# Patient Record
Sex: FEMALE | Race: WHITE | NOT HISPANIC OR LATINO | Employment: OTHER | URBAN - METROPOLITAN AREA
[De-identification: names, ages, dates, MRNs, and addresses within clinical notes are randomized per-mention and may not be internally consistent; named-entity substitution may affect disease eponyms.]

---

## 2018-09-12 LAB
ALT SERPL-CCNC: 22 IU/L (ref 8–45)
AST SERPL-CCNC: 26 IU/L (ref 2–40)
CHOLEST SERPL-MCNC: 167 MG/DL (ref 100–199)
CREAT SERPL-MCNC: 0.81 MG/DL (ref 0.57–1.11)
GFR SERPL CREATININE-BSD FRML MDRD: >60 ML/MIN/1.73M2
GLUCOSE SERPL-MCNC: 114 MG/DL (ref 65–100)
HDLC SERPL-MCNC: 46 MG/DL
LDLC SERPL CALC-MCNC: 92 MG/DL
NONHDLC SERPL-MCNC: 121 MG/DL
POTASSIUM SERPL-SCNC: 4.7 MMOL/L (ref 3.5–5)
TRIGL SERPL-MCNC: 145 MG/DL

## 2019-09-18 ENCOUNTER — TRANSFERRED RECORDS (OUTPATIENT)
Dept: HEALTH INFORMATION MANAGEMENT | Facility: CLINIC | Age: 64
End: 2019-09-18

## 2019-09-18 LAB
ALT SERPL-CCNC: 22 IU/L (ref 8–45)
AST SERPL-CCNC: 25 IU/L (ref 2–40)
CHOLEST SERPL-MCNC: 161 MG/DL (ref 100–199)
CREAT SERPL-MCNC: 0.78 MG/DL (ref 0.57–1.11)
GFR SERPL CREATININE-BSD FRML MDRD: >60 ML/MIN/1.73M2
GLUCOSE SERPL-MCNC: 111 MG/DL (ref 65–100)
HDLC SERPL-MCNC: 48 MG/DL
LDLC SERPL CALC-MCNC: 82 MG/DL
NONHDLC SERPL-MCNC: 113 MG/DL
POTASSIUM SERPL-SCNC: 4.7 MMOL/L (ref 3.5–5)
TRIGL SERPL-MCNC: 155 MG/DL

## 2020-01-27 ENCOUNTER — TRANSFERRED RECORDS (OUTPATIENT)
Dept: HEALTH INFORMATION MANAGEMENT | Facility: CLINIC | Age: 65
End: 2020-01-27

## 2021-02-15 ENCOUNTER — OFFICE VISIT (OUTPATIENT)
Dept: INTERNAL MEDICINE | Facility: CLINIC | Age: 66
End: 2021-02-15
Payer: COMMERCIAL

## 2021-02-15 ENCOUNTER — ANCILLARY PROCEDURE (OUTPATIENT)
Dept: GENERAL RADIOLOGY | Facility: CLINIC | Age: 66
End: 2021-02-15
Attending: INTERNAL MEDICINE
Payer: COMMERCIAL

## 2021-02-15 VITALS
RESPIRATION RATE: 18 BRPM | DIASTOLIC BLOOD PRESSURE: 98 MMHG | TEMPERATURE: 98.7 F | HEIGHT: 66 IN | HEART RATE: 58 BPM | WEIGHT: 290.9 LBS | OXYGEN SATURATION: 94 % | SYSTOLIC BLOOD PRESSURE: 184 MMHG | BODY MASS INDEX: 46.75 KG/M2

## 2021-02-15 DIAGNOSIS — E66.01 MORBID OBESITY (H): ICD-10-CM

## 2021-02-15 DIAGNOSIS — R06.09 DYSPNEA ON EXERTION: Primary | ICD-10-CM

## 2021-02-15 DIAGNOSIS — R05.9 COUGH: ICD-10-CM

## 2021-02-15 DIAGNOSIS — R91.8 PULMONARY INFILTRATES: ICD-10-CM

## 2021-02-15 DIAGNOSIS — I10 BENIGN ESSENTIAL HYPERTENSION: ICD-10-CM

## 2021-02-15 DIAGNOSIS — E78.5 HYPERLIPIDEMIA LDL GOAL <130: ICD-10-CM

## 2021-02-15 DIAGNOSIS — M85.80 OSTEOPENIA, UNSPECIFIED LOCATION: ICD-10-CM

## 2021-02-15 LAB
ERYTHROCYTE [DISTWIDTH] IN BLOOD BY AUTOMATED COUNT: 13.2 % (ref 10–15)
HCT VFR BLD AUTO: 48.3 % (ref 35–47)
HGB BLD-MCNC: 16 G/DL (ref 11.7–15.7)
MCH RBC QN AUTO: 31.7 PG (ref 26.5–33)
MCHC RBC AUTO-ENTMCNC: 33.1 G/DL (ref 31.5–36.5)
MCV RBC AUTO: 96 FL (ref 78–100)
PLATELET # BLD AUTO: 149 10E9/L (ref 150–450)
RBC # BLD AUTO: 5.05 10E12/L (ref 3.8–5.2)
WBC # BLD AUTO: 10.4 10E9/L (ref 4–11)

## 2021-02-15 PROCEDURE — 36415 COLL VENOUS BLD VENIPUNCTURE: CPT | Performed by: INTERNAL MEDICINE

## 2021-02-15 PROCEDURE — 84439 ASSAY OF FREE THYROXINE: CPT | Performed by: INTERNAL MEDICINE

## 2021-02-15 PROCEDURE — 84443 ASSAY THYROID STIM HORMONE: CPT | Performed by: INTERNAL MEDICINE

## 2021-02-15 PROCEDURE — 85027 COMPLETE CBC AUTOMATED: CPT | Performed by: INTERNAL MEDICINE

## 2021-02-15 PROCEDURE — 99204 OFFICE O/P NEW MOD 45 MIN: CPT | Performed by: INTERNAL MEDICINE

## 2021-02-15 PROCEDURE — 80048 BASIC METABOLIC PNL TOTAL CA: CPT | Performed by: INTERNAL MEDICINE

## 2021-02-15 PROCEDURE — 71046 X-RAY EXAM CHEST 2 VIEWS: CPT | Performed by: RADIOLOGY

## 2021-02-15 ASSESSMENT — MIFFLIN-ST. JEOR: SCORE: 1881.26

## 2021-02-15 NOTE — PROGRESS NOTES
Assessment & Plan     Dyspnea on exertion  Her symptoms have been a very gradual onset, slowly progressive.  Her lung exam reveals diffuse crackles, suggestive of some type of pulmonary process like fibrosis.  Her oxygen saturation is slightly low.  Chest x-ray confirms interstitial process.  Would be relatively unlikely to be heart failure given the extent of the infiltrates, I would expect much more severe symptoms, lower oxygen level will discuss with pulmonary and probably order CT, pulmonary follow-up.  I will go ahead and schedule echo just to evaluate her LV function and look for evidence of any wall motion abnormalities.  Doubt that the atenolol is really affecting this but may need to consider it could be a factor.  Since her symptoms are very gradually changing, I would not start any treatment without further evaluation.  She does not really have significant symptoms to suggest a thyroid problem, but will check that, will rule out anemia as a factor in her dyspnea.  - XR Chest 2 Views  - CBC with platelets  - TSH with free T4 reflex  - T4 free  - PULMONARY MEDICINE REFERRAL    Morbid obesity (H)  Work on weight, she has lost 10 pounds, continue working on this.    Cough  This may be related to the pulmonary process but could also be related to her lisinopril.  We will have her hold that for up to a week to see if that helps with cough.  - XR Chest 2 Views  - PULMONARY MEDICINE REFERRAL    Pulmonary infiltrates  As above  - PULMONARY MEDICINE REFERRAL    Benign essential hypertension  Blood pressure here is high. Her home checks have been good.  First I will have her hold the lisinopril to see if it is aggravating her cough at all, may need to use an ARB, continue to monitor at home.  We may need to add another medication if the blood pressure remains high.    Osteopenia, unspecified location  Recommend she stop the Fosamax since her last bone density was normal, will recheck the bone density to get a  new baseline on our machine    Hyperlipidemia LDL goal <130  Labs were good last May, continue medication, will need to recheck later this year    Return in about 4 months (around 6/15/2021) for Physical Exam.    Peyton Marin MD  [facility] [place]    Alexandra Todd is a 65 year old who presents for the following health issues     HPI       New Patient/Transfer of Care  Previous care: [facility]    Current concerns:   1. Dyspnea on exertion: this has been going on for a few years but has gotten worse over the past year. At first she would just get dyspnea on stairs but now can be short of breath on level ground, has to stop after 200-300 feet. She does recover in a few minutes. She does not have any history of asthma. No PND or orthopnea, no chest pain, palpitations, diaphoresis, lightheadedness or edema.     2. Cough: this has been going on for almost a year. It is sometimes related to postnasal drainage but frequently she has a dry cough without drainage.  She does not really have an heartburn.     3. HTN: her home readings usually run 139-140/80, last checked a few weeks ago.     Other problems:   1. Osteopenia: has been on Fosamax 8-9 years. Last DXA was normal in 2014.   2. 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 Gets plugging left ear at times   3. Hyperlipidemia: on treatment, last LDL <100 5/2020.          Current Outpatient Medications   Medication Sig Dispense Refill     Alendronate Sodium (FOSAMAX PO) Take 70 mg by mouth once a week        ATENOLOL PO Take 100 mg by mouth daily        calcium-vitamin D (CALTRATE) 600-400 MG-UNIT per tablet Take 1 tablet by mouth 2 times daily       LISINOPRIL PO Take 40 mg by mouth daily        SIMVASTATIN PO Take 20 mg by mouth At Bedtime         Social History     Tobacco Use     Smoking status: Never Smoker     Smokeless tobacco: Never Used   Substance Use Topics     Alcohol use: Yes     Comment: Occas     Drug use: No          Review of Systems   No fever,  "chills, some postnasal drainage, no sore throat, no heartburn, no chest tightness or wheezing, no chest pain, palpitations, lightheadedness or syncope, diaphoresis, PND, orthopnea,      Objective    BP (!) 184/98 (BP Location: Left arm, Patient Position: Sitting, Cuff Size: Adult Regular)   Pulse 58   Temp 98.7  F (37.1  C) (Oral)   Resp 18   Ht 1.676 m (5' 6\")   Wt 132 kg (290 lb 14.4 oz)   SpO2 94%   BMI 46.95 kg/m    Body mass index is 46.95 kg/m .  Physical Exam     Neck: No thyromegaly or thyroid nodules  Lungs: Diffuse crackles, no wheezes, good air movement  CV: normal S1, S2 without murmur, S3 or S4.   Abdomen: Bowel sounds normal, soft, nontender. No hepatosplenomegaly. No masses.   No edema     Chest x-ray shows diffuse pulmonary infiltrates            "

## 2021-02-15 NOTE — NURSING NOTE
"BP (!) 209/93 (BP Location: Left arm, Patient Position: Sitting, Cuff Size: Adult Regular)   Pulse 58   Temp 98.7  F (37.1  C) (Oral)   Resp 18   Ht 1.676 m (5' 6\")   Wt 132 kg (290 lb 14.4 oz)   SpO2 94%   BMI 46.95 kg/m      "

## 2021-02-16 PROBLEM — E66.01 MORBID OBESITY (H): Status: ACTIVE | Noted: 2021-02-16

## 2021-02-16 PROBLEM — I10 BENIGN ESSENTIAL HYPERTENSION: Status: ACTIVE | Noted: 2021-02-16

## 2021-02-16 PROBLEM — E78.5 HYPERLIPIDEMIA LDL GOAL <130: Status: ACTIVE | Noted: 2021-02-16

## 2021-02-16 PROBLEM — M85.80 OSTEOPENIA, UNSPECIFIED LOCATION: Status: ACTIVE | Noted: 2021-02-16

## 2021-02-16 LAB
ANION GAP SERPL CALCULATED.3IONS-SCNC: 6 MMOL/L (ref 3–14)
BUN SERPL-MCNC: 14 MG/DL (ref 7–30)
CALCIUM SERPL-MCNC: 9.4 MG/DL (ref 8.5–10.1)
CHLORIDE SERPL-SCNC: 107 MMOL/L (ref 94–109)
CO2 SERPL-SCNC: 26 MMOL/L (ref 20–32)
CREAT SERPL-MCNC: 0.75 MG/DL (ref 0.52–1.04)
GFR SERPL CREATININE-BSD FRML MDRD: 83 ML/MIN/{1.73_M2}
GLUCOSE SERPL-MCNC: 87 MG/DL (ref 70–99)
POTASSIUM SERPL-SCNC: 4.4 MMOL/L (ref 3.4–5.3)
SODIUM SERPL-SCNC: 139 MMOL/L (ref 133–144)
T4 FREE SERPL-MCNC: 0.9 NG/DL (ref 0.76–1.46)
TSH SERPL DL<=0.005 MIU/L-ACNC: 4.45 MU/L (ref 0.4–4)

## 2021-02-22 ENCOUNTER — TELEPHONE (OUTPATIENT)
Dept: INTERNAL MEDICINE | Facility: CLINIC | Age: 66
End: 2021-02-22

## 2021-02-22 DIAGNOSIS — I10 BENIGN ESSENTIAL HYPERTENSION: Primary | ICD-10-CM

## 2021-02-22 DIAGNOSIS — J84.9 ILD (INTERSTITIAL LUNG DISEASE) (H): ICD-10-CM

## 2021-02-22 DIAGNOSIS — R06.00 DYSPNEA, UNSPECIFIED TYPE: ICD-10-CM

## 2021-02-22 RX ORDER — IRBESARTAN 300 MG/1
300 TABLET ORAL DAILY
Qty: 30 TABLET | Refills: 1 | Status: SHIPPED | OUTPATIENT
Start: 2021-02-22 | End: 2021-03-22

## 2021-02-22 NOTE — TELEPHONE ENCOUNTER
Patient calls to advise her status on Lisinopril.  Patient states she had a lot of coughing although it is about 50% better.Bp readings from am.  Patient did not know the dates. 139/84 and 144/79. Please advise plan moving forward.

## 2021-02-22 NOTE — TELEPHONE ENCOUNTER
Call to patient, informed of providers message. Patient states she would like to try the irbesartan, pharmacy pended. Patient also states someone was supposed to call her to schedule her for a CT scan and echocardiogram but radiology states she does not have orders placed for it. Per result notes 2/15/21:  I am going to have her get a CT scan of her chest to look at the lungs better. I recommend just a plain ultrasound of the heart to check its function. I am not going to order a stress test for now. She will be called to schedule the CT and echocardiogram.    Please place orders as appropriate.     Thank you.

## 2021-02-22 NOTE — TELEPHONE ENCOUNTER
Sounds like the lisinopril is probably part of the cough.  It sometimes can take a few weeks for it to completely go away after stopping the medication. If she wanted to confirm that the lisinopril is causing cough, she could restart it.  Usually the cough will increase significantly within 1 to 2 days.  Otherwise I would change to lisinopril to new medication from a different family that can work in a similar way called irbesartan or Avapro.  This very rarely causes any cough.   If she wants to go ahead with the irbesartan, I will send a prescription and would suggest scheduling a nurse blood pressure check after 3 weeks or so to make sure the doses keeping the blood pressure under good control.

## 2021-02-23 DIAGNOSIS — I10 BENIGN ESSENTIAL HYPERTENSION: ICD-10-CM

## 2021-02-23 NOTE — TELEPHONE ENCOUNTER
Please advise also so forgot to get the orders after speaking with pulmonary, I did order them now so she will be called.  I sent the irbesartan.

## 2021-02-25 RX ORDER — IRBESARTAN 300 MG/1
TABLET ORAL
Qty: 90 TABLET | Refills: 0 | OUTPATIENT
Start: 2021-02-25

## 2021-02-25 NOTE — TELEPHONE ENCOUNTER
Pending Prescriptions:                       Disp   Refills    irbesartan (AVAPRO) 300 MG tablet [Pharma*90 tab*0            Sig: TAKE 1 TABLET(300 MG) BY MOUTH DAILY    Patient is requesting a 90 day supply

## 2021-03-03 ENCOUNTER — HOSPITAL ENCOUNTER (OUTPATIENT)
Dept: CT IMAGING | Facility: CLINIC | Age: 66
Discharge: HOME | End: 2021-03-03
Attending: INTERNAL MEDICINE | Admitting: INTERNAL MEDICINE
Payer: COMMERCIAL

## 2021-03-03 DIAGNOSIS — R06.00 DYSPNEA, UNSPECIFIED TYPE: ICD-10-CM

## 2021-03-03 DIAGNOSIS — J84.9 ILD (INTERSTITIAL LUNG DISEASE) (H): ICD-10-CM

## 2021-03-03 PROCEDURE — 250N000011 HC RX IP 250 OP 636: Performed by: INTERNAL MEDICINE

## 2021-03-03 PROCEDURE — 71260 CT THORAX DX C+: CPT

## 2021-03-03 PROCEDURE — 250N000009 HC RX 250: Performed by: INTERNAL MEDICINE

## 2021-03-03 RX ORDER — IOPAMIDOL 755 MG/ML
500 INJECTION, SOLUTION INTRAVASCULAR ONCE
Status: COMPLETED | OUTPATIENT
Start: 2021-03-03 | End: 2021-03-03

## 2021-03-03 RX ADMIN — SODIUM CHLORIDE 60 ML: 9 INJECTION, SOLUTION INTRAVENOUS at 12:43

## 2021-03-03 RX ADMIN — IOPAMIDOL 80 ML: 755 INJECTION, SOLUTION INTRAVENOUS at 12:43

## 2021-03-09 ENCOUNTER — HOSPITAL ENCOUNTER (OUTPATIENT)
Dept: CARDIOLOGY | Facility: CLINIC | Age: 66
Discharge: HOME | End: 2021-03-09
Attending: INTERNAL MEDICINE | Admitting: INTERNAL MEDICINE
Payer: COMMERCIAL

## 2021-03-09 DIAGNOSIS — J84.9 ILD (INTERSTITIAL LUNG DISEASE) (H): ICD-10-CM

## 2021-03-09 DIAGNOSIS — R06.00 DYSPNEA, UNSPECIFIED TYPE: ICD-10-CM

## 2021-03-09 PROCEDURE — 93306 TTE W/DOPPLER COMPLETE: CPT

## 2021-03-09 PROCEDURE — 93306 TTE W/DOPPLER COMPLETE: CPT | Mod: 26 | Performed by: INTERNAL MEDICINE

## 2021-03-10 ENCOUNTER — HOSPITAL ENCOUNTER (OUTPATIENT)
Dept: LAB | Facility: CLINIC | Age: 66
Discharge: HOME | End: 2021-03-10
Attending: INTERNAL MEDICINE | Admitting: INTERNAL MEDICINE
Payer: COMMERCIAL

## 2021-03-10 ENCOUNTER — MEDICAL CORRESPONDENCE (OUTPATIENT)
Dept: HEALTH INFORMATION MANAGEMENT | Facility: CLINIC | Age: 66
End: 2021-03-10

## 2021-03-10 ENCOUNTER — TRANSFERRED RECORDS (OUTPATIENT)
Dept: HEALTH INFORMATION MANAGEMENT | Facility: CLINIC | Age: 66
End: 2021-03-10

## 2021-03-10 DIAGNOSIS — Z00.00 ROUTINE MEDICAL EXAM: Primary | ICD-10-CM

## 2021-03-10 DIAGNOSIS — R06.00 DYSPNEA, PAROXYSMAL NOCTURNAL: ICD-10-CM

## 2021-03-10 DIAGNOSIS — J98.01 ACUTE BRONCHOSPASM: ICD-10-CM

## 2021-03-10 DIAGNOSIS — R05.9 COUGH: ICD-10-CM

## 2021-03-10 DIAGNOSIS — D75.1 POLYCYTHEMIA, SECONDARY: Primary | ICD-10-CM

## 2021-03-10 DIAGNOSIS — R06.9 ABNORMAL RESPIRATORY RATE: ICD-10-CM

## 2021-03-10 LAB
ALBUMIN SERPL-MCNC: 3.6 G/DL (ref 3.4–5)
ALP SERPL-CCNC: 91 U/L (ref 40–150)
ALT SERPL W P-5'-P-CCNC: 33 U/L (ref 0–50)
AST SERPL W P-5'-P-CCNC: 23 U/L (ref 0–45)
BASOPHILS # BLD AUTO: 0 10E9/L (ref 0–0.2)
BASOPHILS NFR BLD AUTO: 0.4 %
BILIRUB DIRECT SERPL-MCNC: 0.2 MG/DL (ref 0–0.2)
BILIRUB SERPL-MCNC: 0.9 MG/DL (ref 0.2–1.3)
CRP SERPL-MCNC: <2.9 MG/L (ref 0–8)
DIFFERENTIAL METHOD BLD: ABNORMAL
EOSINOPHIL # BLD AUTO: 0.4 10E9/L (ref 0–0.7)
EOSINOPHIL NFR BLD AUTO: 4.4 %
ERYTHROCYTE [DISTWIDTH] IN BLOOD BY AUTOMATED COUNT: 12.5 % (ref 10–15)
ERYTHROCYTE [SEDIMENTATION RATE] IN BLOOD BY WESTERGREN METHOD: 8 MM/H (ref 0–30)
HCT VFR BLD AUTO: 52.1 % (ref 35–47)
HGB BLD-MCNC: 16.9 G/DL (ref 11.7–15.7)
IMM GRANULOCYTES # BLD: 0 10E9/L (ref 0–0.4)
IMM GRANULOCYTES NFR BLD: 0.2 %
LYMPHOCYTES # BLD AUTO: 1.4 10E9/L (ref 0.8–5.3)
LYMPHOCYTES NFR BLD AUTO: 15.5 %
MCH RBC QN AUTO: 31.9 PG (ref 26.5–33)
MCHC RBC AUTO-ENTMCNC: 32.4 G/DL (ref 31.5–36.5)
MCV RBC AUTO: 98 FL (ref 78–100)
MONOCYTES # BLD AUTO: 0.7 10E9/L (ref 0–1.3)
MONOCYTES NFR BLD AUTO: 8 %
NEUTROPHILS # BLD AUTO: 6.4 10E9/L (ref 1.6–8.3)
NEUTROPHILS NFR BLD AUTO: 71.5 %
NRBC # BLD AUTO: 0 10*3/UL
NRBC BLD AUTO-RTO: 0 /100
PLATELET # BLD AUTO: 150 10E9/L (ref 150–450)
PROT SERPL-MCNC: 7.9 G/DL (ref 6.8–8.8)
RBC # BLD AUTO: 5.3 10E12/L (ref 3.8–5.2)
WBC # BLD AUTO: 9 10E9/L (ref 4–11)

## 2021-03-10 PROCEDURE — 86606 ASPERGILLUS ANTIBODY: CPT | Mod: 59 | Performed by: INTERNAL MEDICINE

## 2021-03-10 PROCEDURE — 83516 IMMUNOASSAY NONANTIBODY: CPT | Performed by: INTERNAL MEDICINE

## 2021-03-10 PROCEDURE — 82164 ANGIOTENSIN I ENZYME TEST: CPT | Performed by: INTERNAL MEDICINE

## 2021-03-10 PROCEDURE — 86003 ALLG SPEC IGE CRUDE XTRC EA: CPT | Performed by: INTERNAL MEDICINE

## 2021-03-10 PROCEDURE — 86235 NUCLEAR ANTIGEN ANTIBODY: CPT | Performed by: INTERNAL MEDICINE

## 2021-03-10 PROCEDURE — 36415 COLL VENOUS BLD VENIPUNCTURE: CPT | Performed by: INTERNAL MEDICINE

## 2021-03-10 PROCEDURE — 85652 RBC SED RATE AUTOMATED: CPT | Performed by: INTERNAL MEDICINE

## 2021-03-10 PROCEDURE — 86635 COCCIDIOIDES ANTIBODY: CPT | Performed by: INTERNAL MEDICINE

## 2021-03-10 PROCEDURE — 86038 ANTINUCLEAR ANTIBODIES: CPT | Performed by: INTERNAL MEDICINE

## 2021-03-10 PROCEDURE — 86431 RHEUMATOID FACTOR QUANT: CPT | Performed by: INTERNAL MEDICINE

## 2021-03-10 PROCEDURE — 86612 BLASTOMYCES ANTIBODY: CPT | Performed by: INTERNAL MEDICINE

## 2021-03-10 PROCEDURE — 86140 C-REACTIVE PROTEIN: CPT | Performed by: INTERNAL MEDICINE

## 2021-03-10 PROCEDURE — 86331 IMMUNODIFFUSION OUCHTERLONY: CPT | Performed by: INTERNAL MEDICINE

## 2021-03-10 PROCEDURE — 80076 HEPATIC FUNCTION PANEL: CPT | Performed by: INTERNAL MEDICINE

## 2021-03-10 PROCEDURE — 86255 FLUORESCENT ANTIBODY SCREEN: CPT | Performed by: INTERNAL MEDICINE

## 2021-03-10 PROCEDURE — 85025 COMPLETE CBC W/AUTO DIFF WBC: CPT | Performed by: INTERNAL MEDICINE

## 2021-03-10 PROCEDURE — 83876 ASSAY MYELOPEROXIDASE: CPT | Performed by: INTERNAL MEDICINE

## 2021-03-10 PROCEDURE — 86606 ASPERGILLUS ANTIBODY: CPT | Performed by: INTERNAL MEDICINE

## 2021-03-10 PROCEDURE — 86039 ANTINUCLEAR ANTIBODIES (ANA): CPT | Performed by: INTERNAL MEDICINE

## 2021-03-11 ENCOUNTER — MEDICAL CORRESPONDENCE (OUTPATIENT)
Dept: HEALTH INFORMATION MANAGEMENT | Facility: CLINIC | Age: 66
End: 2021-03-11

## 2021-03-11 LAB
ACE SERPL-CCNC: 48 U/L (ref 9–67)
ANA PAT SER IF-IMP: ABNORMAL
ANA SER QL IF: POSITIVE
ANA TITR SER IF: ABNORMAL {TITER}
ANCA AB PATTERN SER IF-IMP: NORMAL
C-ANCA TITR SER IF: NORMAL {TITER}
ENA JO1 IGG SER-ACNC: <0.2 AI (ref 0–0.9)
ENA SCL70 IGG SER IA-ACNC: <0.2 AI (ref 0–0.9)
GBM IGG SER IA-ACNC: <0.2 AI (ref 0–0.9)
MYELOPEROXIDASE AB SER-ACNC: <0.2 AI (ref 0–0.9)
PROTEINASE3 IGG SER-ACNC: <0.2 AI (ref 0–0.9)
RHEUMATOID FACT SER NEPH-ACNC: 22 IU/ML (ref 0–20)

## 2021-03-12 ENCOUNTER — TELEPHONE (OUTPATIENT)
Dept: INTERNAL MEDICINE | Facility: CLINIC | Age: 66
End: 2021-03-12

## 2021-03-12 NOTE — TELEPHONE ENCOUNTER
Spoke with patient.  Advised of message below from provider.    1. Heart rate @ pulmon appointment 3-10-21 was 57.  2.  Denies faintness when stands up quickly.  3.  Will check her pulse sitting @ rest.  Will call 3-15-21 with results.  4.  Only recent blood pressure she has was from pulmon appointment 3-10-21--162/98.  5.  States she is now on continuous O2 @ 2L.  States, at pulmon appointment,  her O2 was 88% on RA at rest and 85% on RA walking.  With O2 @ 2L, was 91% walking and 96% @ rest.  6.  Has a nurse only blood pressure check appointment 3-16-21.  7.  Has a follow up appointment with pulmon in 3 wks.

## 2021-03-12 NOTE — TELEPHONE ENCOUNTER
Reason for Call:  Other prescription    Detailed comments: patients pulmonologist said her heart rate is slow. He said to talk with Dr Marin to discuss stopping atenolol and see if calcium channel blocker would be a better replacement    Phone Number Patient can be reached at: Home number on file [phone] (home)    Best Time: any    Can we leave a detailed message on this number? YES    Call taken on 3/12/2021 at 11:47 AM by Misti Chan

## 2021-03-12 NOTE — TELEPHONE ENCOUNTER
Did they say what her heart rate was?  Is she having any faintness when she stands up quickly?  I would suggest she try checking her pulse at her neck when sitting and resting several times over the next few days and call back with those results.  Does she have any recent blood pressure checks?

## 2021-03-13 LAB
ASPERGILLUS AB TITR SER CF: NORMAL {TITER}
COCCIDIOIDES AB SPEC QL ID: NORMAL

## 2021-03-14 LAB — B DERMAT AB SER QL ID: NORMAL

## 2021-03-15 LAB
A FLAVUS AB SER QL ID: NORMAL
A FUMIGATUS IGE QN: <0.1 KU(A)/L
A FUMIGATUS1 AB SER QL ID: NORMAL
A FUMIGATUS2 AB SER QL ID: NORMAL
A FUMIGATUS3 AB SER QL ID: NORMAL
A FUMIGATUS6 AB SER QL ID: NORMAL
A PULLULANS AB SER QL ID: NORMAL
PIGEON DROP IGE QN: NORMAL
S RECTIVIRGULA AB SER QL ID: NORMAL
S VIRIDIS AB SER QL ID: NORMAL
T CANDIDUS AB SER QL: NORMAL
T VULGARIS AB SER QL ID: NORMAL

## 2021-03-15 NOTE — TELEPHONE ENCOUNTER
Patient calling back.   3-   4 pm Pulse is 54  10 pm pulse is 60    3/13/2021  10 am Pulse is 54  2:30 pm pulse is 68    3/14/2021   9 am pulse 57  12:30 pm  pulse 61    3/15/2021 9 am pulse is 58  2:15 pm pulse is 69

## 2021-03-16 ENCOUNTER — ALLIED HEALTH/NURSE VISIT (OUTPATIENT)
Dept: NURSING | Facility: CLINIC | Age: 66
End: 2021-03-16
Payer: COMMERCIAL

## 2021-03-16 VITALS — DIASTOLIC BLOOD PRESSURE: 64 MMHG | SYSTOLIC BLOOD PRESSURE: 114 MMHG

## 2021-03-16 DIAGNOSIS — I10 ESSENTIAL HYPERTENSION: Primary | ICD-10-CM

## 2021-03-17 DIAGNOSIS — E78.5 HYPERLIPIDEMIA LDL GOAL <130: Primary | ICD-10-CM

## 2021-03-18 RX ORDER — SIMVASTATIN 20 MG
20 TABLET ORAL AT BEDTIME
Qty: 90 TABLET | Refills: 0 | Status: SHIPPED | OUTPATIENT
Start: 2021-03-18 | End: 2021-06-14

## 2021-03-18 NOTE — TELEPHONE ENCOUNTER
Pending Prescriptions:                       Disp   Refills    simvastatin (ZOCOR) 20 MG tablet                           Sig: Take 1 tablet (20 mg) by mouth At Bedtime    Routing refill request to provider for review/approval because:  Medication is reported/historical

## 2021-03-21 DIAGNOSIS — I10 BENIGN ESSENTIAL HYPERTENSION: ICD-10-CM

## 2021-03-22 RX ORDER — IRBESARTAN 300 MG/1
TABLET ORAL
Qty: 90 TABLET | Refills: 1 | Status: SHIPPED | OUTPATIENT
Start: 2021-03-22 | End: 2021-10-25

## 2021-03-22 NOTE — TELEPHONE ENCOUNTER
Please call the patient and advise that I had not received the results of her nurse blood pressure check last week.  It does look like her blood pressure is good, so I did go ahead and send a 90-day supply.   It is fine to follow-up with me in June.

## 2021-03-22 NOTE — TELEPHONE ENCOUNTER
Pending Prescriptions:                       Disp   Refills    irbesartan (AVAPRO) 300 MG tablet [Pharmac*90 tab*         Sig: TAKE 1 TABLET(300 MG) BY MOUTH DAILY    Patient is requesting a 90 day supply

## 2021-03-23 RX ORDER — ATENOLOL 50 MG/1
50 TABLET ORAL DAILY
Qty: 90 TABLET | Refills: 1 | Status: SHIPPED | OUTPATIENT
Start: 2021-03-23 | End: 2021-06-14 | Stop reason: ALTCHOICE

## 2021-03-23 NOTE — TELEPHONE ENCOUNTER
Patient aware of providers recommendation - she is also requesting refill for atenolol 100mg    Please noted that she states she was told to take 1/2 tab 50 mg so she was wondering if prescription can come in 50 mgs  As her cutting skills are not great .    [facility] -Siomara Weiner,ELLIEN

## 2021-03-24 ENCOUNTER — TRANSFERRED RECORDS (OUTPATIENT)
Dept: HEALTH INFORMATION MANAGEMENT | Facility: CLINIC | Age: 66
End: 2021-03-24

## 2021-04-07 ENCOUNTER — TRANSFERRED RECORDS (OUTPATIENT)
Dept: HEALTH INFORMATION MANAGEMENT | Facility: CLINIC | Age: 66
End: 2021-04-07

## 2021-04-10 ENCOUNTER — TELEPHONE (OUTPATIENT)
Dept: INTERNAL MEDICINE | Facility: CLINIC | Age: 66
End: 2021-04-10

## 2021-04-10 DIAGNOSIS — R76.8 POSITIVE ANA (ANTINUCLEAR ANTIBODY): ICD-10-CM

## 2021-04-10 DIAGNOSIS — J84.10 PULMONARY FIBROSIS (H): Primary | ICD-10-CM

## 2021-04-10 NOTE — TELEPHONE ENCOUNTER
I received your message from pulmonary ill-appearing.  Positive VERENICE and rheumatoid factor tests and we are recommending referral to rheumatology.  They had suggested Dr.Maren Rowan but [facility].  It is not clear that they actually generated any referral.    I did put in the referral, please call patient and give her the number: [phone]  Please fax copies of the records in my out basket to them.

## 2021-04-19 DIAGNOSIS — I10 BENIGN ESSENTIAL HYPERTENSION: ICD-10-CM

## 2021-04-21 RX ORDER — IRBESARTAN 300 MG/1
TABLET ORAL
Qty: 30 TABLET | OUTPATIENT
Start: 2021-04-21

## 2021-05-04 ENCOUNTER — HOSPITAL ENCOUNTER (OUTPATIENT)
Dept: CT IMAGING | Facility: CLINIC | Age: 66
Discharge: HOME | End: 2021-05-04
Attending: INTERNAL MEDICINE | Admitting: INTERNAL MEDICINE
Payer: COMMERCIAL

## 2021-05-04 DIAGNOSIS — R06.00 DYSPNEA: ICD-10-CM

## 2021-05-04 DIAGNOSIS — J84.9 INTERSTITIAL PNEUMONIA (H): ICD-10-CM

## 2021-05-04 DIAGNOSIS — R09.02 HYPOXEMIA: ICD-10-CM

## 2021-05-04 DIAGNOSIS — R05.9 COUGH: ICD-10-CM

## 2021-05-04 PROCEDURE — 71250 CT THORAX DX C-: CPT

## 2021-05-11 ENCOUNTER — MEDICAL CORRESPONDENCE (OUTPATIENT)
Dept: HEALTH INFORMATION MANAGEMENT | Facility: CLINIC | Age: 66
End: 2021-05-11

## 2021-05-11 ENCOUNTER — TRANSFERRED RECORDS (OUTPATIENT)
Dept: HEALTH INFORMATION MANAGEMENT | Facility: CLINIC | Age: 66
End: 2021-05-11

## 2021-05-11 DIAGNOSIS — J84.112 IPF (IDIOPATHIC PULMONARY FIBROSIS) (H): Primary | ICD-10-CM

## 2021-05-20 ENCOUNTER — TRANSFERRED RECORDS (OUTPATIENT)
Dept: HEALTH INFORMATION MANAGEMENT | Facility: CLINIC | Age: 66
End: 2021-05-20

## 2021-05-21 ENCOUNTER — HOSPITAL ENCOUNTER (OUTPATIENT)
Dept: LAB | Facility: CLINIC | Age: 66
Discharge: HOME | End: 2021-05-21
Attending: INTERNAL MEDICINE | Admitting: INTERNAL MEDICINE
Payer: COMMERCIAL

## 2021-05-21 DIAGNOSIS — J84.112 IPF (IDIOPATHIC PULMONARY FIBROSIS) (H): ICD-10-CM

## 2021-05-21 LAB
ALBUMIN SERPL-MCNC: 3.5 G/DL (ref 3.4–5)
ALP SERPL-CCNC: 90 U/L (ref 40–150)
ALT SERPL W P-5'-P-CCNC: 29 U/L (ref 0–50)
AST SERPL W P-5'-P-CCNC: 13 U/L (ref 0–45)
BILIRUB DIRECT SERPL-MCNC: 0.2 MG/DL (ref 0–0.2)
BILIRUB SERPL-MCNC: 0.7 MG/DL (ref 0.2–1.3)
PROT SERPL-MCNC: 7.4 G/DL (ref 6.8–8.8)

## 2021-05-21 PROCEDURE — 36415 COLL VENOUS BLD VENIPUNCTURE: CPT | Performed by: INTERNAL MEDICINE

## 2021-05-21 PROCEDURE — 80076 HEPATIC FUNCTION PANEL: CPT | Performed by: INTERNAL MEDICINE

## 2021-06-14 ENCOUNTER — OFFICE VISIT (OUTPATIENT)
Dept: INTERNAL MEDICINE | Facility: CLINIC | Age: 66
End: 2021-06-14
Payer: COMMERCIAL

## 2021-06-14 VITALS
HEIGHT: 66 IN | OXYGEN SATURATION: 96 % | SYSTOLIC BLOOD PRESSURE: 154 MMHG | DIASTOLIC BLOOD PRESSURE: 77 MMHG | RESPIRATION RATE: 20 BRPM | WEIGHT: 293 LBS | TEMPERATURE: 98.1 F | HEART RATE: 60 BPM | BODY MASS INDEX: 47.09 KG/M2

## 2021-06-14 DIAGNOSIS — E78.5 HYPERLIPIDEMIA LDL GOAL <130: ICD-10-CM

## 2021-06-14 DIAGNOSIS — I10 BENIGN ESSENTIAL HYPERTENSION: Primary | ICD-10-CM

## 2021-06-14 DIAGNOSIS — J84.112 IPF (IDIOPATHIC PULMONARY FIBROSIS) (H): ICD-10-CM

## 2021-06-14 DIAGNOSIS — E66.01 MORBID OBESITY (H): ICD-10-CM

## 2021-06-14 DIAGNOSIS — G47.33 OSA (OBSTRUCTIVE SLEEP APNEA): ICD-10-CM

## 2021-06-14 DIAGNOSIS — I10 BENIGN ESSENTIAL HYPERTENSION: ICD-10-CM

## 2021-06-14 PROCEDURE — 99214 OFFICE O/P EST MOD 30 MIN: CPT | Performed by: INTERNAL MEDICINE

## 2021-06-14 RX ORDER — OMEPRAZOLE 40 MG/1
40 CAPSULE, DELAYED RELEASE ORAL DAILY
COMMUNITY
Start: 2021-06-14

## 2021-06-14 RX ORDER — AMLODIPINE BESYLATE 5 MG/1
5 TABLET ORAL DAILY
Qty: 30 TABLET | Refills: 1 | Status: SHIPPED | OUTPATIENT
Start: 2021-06-14 | End: 2021-06-16

## 2021-06-14 RX ORDER — ALBUTEROL SULFATE 90 UG/1
2 AEROSOL, METERED RESPIRATORY (INHALATION) EVERY 6 HOURS
Status: ON HOLD | COMMUNITY
Start: 2021-06-14 | End: 2024-01-09

## 2021-06-14 ASSESSMENT — MIFFLIN-ST. JEOR: SCORE: 1949.29

## 2021-06-14 NOTE — PROGRESS NOTES
"    Assessment & Plan     Benign essential hypertension  Blood pressure is a little bit high today.  We decreased her atenolol in the past to see if it helped with her breathing.  She now has reported bradycardia at night so recommend just discontinue the atenolol and start on amlodipine.  She will contact me if any bothersome side effects, call in some blood pressure readings in 3 to 4 weeks.  - amLODIPine (NORVASC) 5 MG tablet; Take 1 tablet (5 mg) by mouth daily    IPF (idiopathic pulmonary fibrosis) (H)  She is working with pulmonary, on new treatment.  Continue follow-up with pulmonary    Morbid obesity (H)  Weight has gone up, at this time needs to focus on the pulmonary issues    Hyperlipidemia LDL goal <130  It appears that when she started on medication, the highest LDL she had was 148.  Her new medication needs liver monitoring, suggest we just discontinue her statin to reduce number of medications and avoid any potential liver effects.  We can recheck her lipids in the future if indicated.    NOVA: follow up pulmonary.   956}     BMI:   Estimated body mass index is 49.55 kg/m  as calculated from the following:    Height as of this encounter: 1.676 m (5' 6\").    Weight as of this encounter: 139.3 kg (307 lb).   Weight management plan: Discussed healthy diet and exercise guidelines        Return in about 6 months (around 12/14/2021).    Peyton Marin MD  [facility]    Alexandra Todd is a 66 year old who presents for the following health issues     HPI         Hyperlipidemia Follow-Up      Are you regularly taking any medication or supplement to lower your cholesterol?   Yes- simvastatin    Are you having muscle aches or other side effects that you think could be caused by your cholesterol lowering medication?  No    Hypertension Follow-up  Blood pressure has been a little bit high recently.    Do you check your blood pressure regularly outside of the clinic? Yes     Are you " following a low salt diet? Yes    Are your blood pressures ever more than 140 on the top number (systolic) OR more   than 90 on the bottom number (diastolic), for example 140/90? Yes      How many servings of fruits and vegetables do you eat daily?  2-3    On average, how many sweetened beverages do you drink each day (Examples: soda, juice, sweet tea, etc.  Do NOT count diet or artificially sweetened beverages)?   0    How many days per week do you exercise enough to make your heart beat faster? 3 or less    How many minutes a day do you exercise enough to make your heart beat faster? 9 or less    How many days per week do you miss taking your medication? 0    Other problems:  Pulmonary fibrosis: After her cardiac work-up was negative, she was referred to pulmonary and they did a full assessment and feels she has idiopathic pulmonary fibrosis.  She is on oxygen, has started on treatment to try to help maintain her pulmonary function.  She is on inhalers which may help a little bit.  She also has been identified as having sleep apnea and was started on CPAP.  When having this monitored, she was dropping her sats so they are adjusting it.  They also said her heart rate was low at night.  She does not know what the rate actually was.    Patient Active Problem List   Diagnosis     Morbid obesity (H)     Benign essential hypertension     Osteopenia, unspecified location     Hyperlipidemia LDL goal <130     IPF (idiopathic pulmonary fibrosis) (H)     NOVA (obstructive sleep apnea)     Current Outpatient Medications   Medication Sig Dispense Refill     albuterol (PROAIR HFA/PROVENTIL HFA/VENTOLIN HFA) 108 (90 Base) MCG/ACT inhaler Inhale 2 puffs into the lungs every 6 hours       amLODIPine (NORVASC) 5 MG tablet Take 1 tablet (5 mg) by mouth daily 30 tablet 1     calcium-vitamin D (CALTRATE) 600-400 MG-UNIT per tablet Take 1 tablet by mouth 2 times daily       fluticasone-vilanterol (BREO ELLIPTA) 200-25 MCG/INH inhaler  "Inhale 1 puff into the lungs daily       irbesartan (AVAPRO) 300 MG tablet TAKE 1 TABLET(300 MG) BY MOUTH DAILY 90 tablet 1     nintedanib (OFEV) 100 MG capsule Take 1 capsule (100 mg) by mouth 2 times daily       omeprazole (PRILOSEC) 40 MG DR capsule Take 1 capsule (40 mg) by mouth daily          Review of Systems   No fever, chills, cough, chest pain      Objective    BP (!) 154/77   Pulse 60   Temp 98.1  F (36.7  C) (Oral)   Resp 20   Ht 1.676 m (5' 6\")   Wt 139.3 kg (307 lb)   SpO2 96%   Breastfeeding No   BMI 49.55 kg/m    Body mass index is 49.55 kg/m .  Physical Exam     Not examined.               "

## 2021-06-14 NOTE — PATIENT INSTRUCTIONS
Decrease the atenolol to 1/2 tablet daily for 3 days.   Start the amlodipine tomorrow.   Stop simvastatin when done with the prescription.   Call 3 BP readings in 3-4 weeks.

## 2021-06-16 RX ORDER — AMLODIPINE BESYLATE 5 MG/1
TABLET ORAL
Qty: 90 TABLET | Refills: 0 | Status: SHIPPED | OUTPATIENT
Start: 2021-06-16 | End: 2021-08-03

## 2021-06-16 NOTE — TELEPHONE ENCOUNTER
Failed protocol.  please route to  team if patient needs an appointment     Maria Del Rosario STEVENSONRN BSN  [facility]  [phone]

## 2021-07-06 ENCOUNTER — HOSPITAL ENCOUNTER (OUTPATIENT)
Dept: LAB | Facility: CLINIC | Age: 66
Discharge: HOME | End: 2021-07-06
Attending: INTERNAL MEDICINE | Admitting: INTERNAL MEDICINE
Payer: COMMERCIAL

## 2021-07-06 ENCOUNTER — TRANSFERRED RECORDS (OUTPATIENT)
Dept: HEALTH INFORMATION MANAGEMENT | Facility: CLINIC | Age: 66
End: 2021-07-06

## 2021-07-06 DIAGNOSIS — J84.112 IPF (IDIOPATHIC PULMONARY FIBROSIS) (H): ICD-10-CM

## 2021-07-06 LAB
ALBUMIN SERPL-MCNC: 3.6 G/DL (ref 3.4–5)
ALP SERPL-CCNC: 113 U/L (ref 40–150)
ALT SERPL W P-5'-P-CCNC: 44 U/L (ref 0–50)
AST SERPL W P-5'-P-CCNC: 32 U/L (ref 0–45)
BILIRUB DIRECT SERPL-MCNC: 0.1 MG/DL (ref 0–0.2)
BILIRUB SERPL-MCNC: 0.5 MG/DL (ref 0.2–1.3)
PROT SERPL-MCNC: 7.8 G/DL (ref 6.8–8.8)

## 2021-07-06 PROCEDURE — 36415 COLL VENOUS BLD VENIPUNCTURE: CPT | Performed by: INTERNAL MEDICINE

## 2021-07-06 PROCEDURE — 80076 HEPATIC FUNCTION PANEL: CPT | Performed by: INTERNAL MEDICINE

## 2021-07-09 ENCOUNTER — TELEPHONE (OUTPATIENT)
Dept: INTERNAL MEDICINE | Facility: CLINIC | Age: 66
End: 2021-07-09

## 2021-07-09 NOTE — TELEPHONE ENCOUNTER
Spoke with patient   She saw Dr Darling 2 days ago and started Lasix 40 mg yesterday. Her BP today was 141/89 and the swelling is slightly improved.  She was placed on Lasix for leg swelling and thinking there is fluid in her lungs contributing to the high BP.  Notes from Dr Darling's office should be coming.    She does not check her weights at home  Breathing has not changed and her breathing tests done at Dr Darling's office were stable    Per Dr Darling's instructions she is to take the lasix 40mg daily x 3 days, then every other day or daily if swelling returns.  She was told to follow up with PCP.    Please advise where pt can be seen    Franky Patrick RN, BSN

## 2021-07-09 NOTE — TELEPHONE ENCOUNTER
This should have been sent to RN for triage and more information rather than sending directly to provider.     Get more information:   Was the lasix started for swelling problems or did the lung doctor think there was fluid in the lungs?  Has she been checking weight, what is the weight doing?  Has her breathing changed a lot the past few days?  Her BP was only mildly elevated here in June, has she had any other BP readings? If so what are they?

## 2021-07-09 NOTE — TELEPHONE ENCOUNTER
Patient reports that she was started on lasix for 3 days after seeing pulmonology. Patient also reports that BP was 148/112 yesterday at pulmonology clinic. Please advise if patient is okay waiting until 7/19 to be seen by PCP or if needs to be seen sooner with different provider.  -Brianna Rodrigez

## 2021-08-03 ENCOUNTER — OFFICE VISIT (OUTPATIENT)
Dept: INTERNAL MEDICINE | Facility: CLINIC | Age: 66
End: 2021-08-03
Payer: COMMERCIAL

## 2021-08-03 VITALS
BODY MASS INDEX: 45.53 KG/M2 | HEIGHT: 66 IN | OXYGEN SATURATION: 83 % | WEIGHT: 283.3 LBS | RESPIRATION RATE: 14 BRPM | HEART RATE: 104 BPM | DIASTOLIC BLOOD PRESSURE: 84 MMHG | SYSTOLIC BLOOD PRESSURE: 163 MMHG | TEMPERATURE: 98.2 F

## 2021-08-03 DIAGNOSIS — R60.0 LOCALIZED EDEMA: ICD-10-CM

## 2021-08-03 DIAGNOSIS — I10 BENIGN ESSENTIAL HYPERTENSION: Primary | ICD-10-CM

## 2021-08-03 DIAGNOSIS — E78.5 HYPERLIPIDEMIA LDL GOAL <130: ICD-10-CM

## 2021-08-03 DIAGNOSIS — I10 BENIGN ESSENTIAL HYPERTENSION: ICD-10-CM

## 2021-08-03 DIAGNOSIS — J84.112 IPF (IDIOPATHIC PULMONARY FIBROSIS) (H): ICD-10-CM

## 2021-08-03 PROCEDURE — 80048 BASIC METABOLIC PNL TOTAL CA: CPT | Performed by: INTERNAL MEDICINE

## 2021-08-03 PROCEDURE — 36415 COLL VENOUS BLD VENIPUNCTURE: CPT | Performed by: INTERNAL MEDICINE

## 2021-08-03 PROCEDURE — 99213 OFFICE O/P EST LOW 20 MIN: CPT | Performed by: INTERNAL MEDICINE

## 2021-08-03 RX ORDER — FUROSEMIDE 40 MG
40 TABLET ORAL DAILY
COMMUNITY
Start: 2021-07-07 | End: 2021-08-03

## 2021-08-03 RX ORDER — NINTEDANIB 150 MG/1
150 CAPSULE ORAL 2 TIMES DAILY
COMMUNITY
Start: 2021-07-23 | End: 2022-02-22 | Stop reason: DRUGHIGH

## 2021-08-03 RX ORDER — POTASSIUM CHLORIDE 1500 MG/1
20 TABLET, EXTENDED RELEASE ORAL EVERY OTHER DAY
COMMUNITY
Start: 2021-07-07 | End: 2021-08-03

## 2021-08-03 RX ORDER — DILTIAZEM HYDROCHLORIDE 300 MG/1
300 CAPSULE, COATED, EXTENDED RELEASE ORAL DAILY
Qty: 30 CAPSULE | Refills: 1 | Status: SHIPPED | OUTPATIENT
Start: 2021-08-03 | End: 2021-08-04

## 2021-08-03 RX ORDER — POTASSIUM CHLORIDE 1500 MG/1
20 TABLET, EXTENDED RELEASE ORAL DAILY
Qty: 90 TABLET | Refills: 1 | Status: SHIPPED | OUTPATIENT
Start: 2021-08-03 | End: 2022-02-22

## 2021-08-03 RX ORDER — FUROSEMIDE 40 MG
40 TABLET ORAL DAILY
Qty: 90 TABLET | Refills: 1 | Status: SHIPPED | OUTPATIENT
Start: 2021-08-03 | End: 2022-02-22

## 2021-08-03 ASSESSMENT — MIFFLIN-ST. JEOR: SCORE: 1841.79

## 2021-08-03 NOTE — LETTER
[place] [address], [address]  [place] [address]  [phone]        August 16, 2021    SanjanaE Weiland  [address] [facility] [address]            Dear Ms. Peyton Freedland:    The sodium, potassium and kidney function are normal.  The blood sugar is elevated because you were not fasting.  We will check again in early September when you do your pulmonary labs along with the cholesterol panel and make sure it staying stable with your water pill.   Please call clinic if you have any questions.       Sincerely,        Peyton Marin M.D.    Results for orders placed or performed in visit on 08/03/21   Basic metabolic panel  (Ca, Cl, CO2, Creat, Gluc, K, Na, BUN)     Status: Abnormal   Result Value Ref Range    Sodium 138 133 - 144 mmol/L    Potassium 4.6 3.4 - 5.3 mmol/L    Chloride 106 94 - 109 mmol/L    Carbon Dioxide (CO2) 28 20 - 32 mmol/L    Anion Gap 4 3 - 14 mmol/L    Urea Nitrogen 13 7 - 30 mg/dL    Creatinine 0.69 0.52 - 1.04 mg/dL    Calcium 9.3 8.5 - 10.1 mg/dL    Glucose 127 (H) 70 - 99 mg/dL    GFR Estimate >90 >60 mL/min/1.73m2

## 2021-08-03 NOTE — PROGRESS NOTES
Assessment & Plan     Benign essential hypertension  Suboptimal control, add diltiazem.  Discussed potential side effects, call if bothered, call some blood pressure readings in several weeks    IPF (idiopathic pulmonary fibrosis) (H)  Ongoing significant symptoms, continue follow-up and treatment with pulmonary    Localized edema  Likely related to some venous changes but may also be related to the pulmonary hypertension associated with pulmonary fibrosis.  Recommend leg elevation, will go ahead with some Lasix, check labs  - furosemide (LASIX) 40 MG tablet; Take 1 tablet (40 mg) by mouth daily  - potassium chloride ER (K-TAB) 20 MEQ CR tablet; Take 1 tablet (20 mEq) by mouth daily  - Basic metabolic panel  (Ca, Cl, CO2, Creat, Gluc, K, Na, BUN)    No follow-ups on file.    Peyton Marin MD  [facility]    Alexandra Todd is a 66 year old who presents for the following health issues     HPI     Hyperlipidemia Follow-Up      Are you regularly taking any medication or supplement to lower your cholesterol?   No    Are you having muscle aches or other side effects that you think could be caused by your cholesterol lowering medication?  No    Hypertension Follow-up  Blood pressures have not been optimally controlled, most recent was 148/112.    Do you check your blood pressure regularly outside of the clinic? Yes     Are you following a low salt diet? Yes    Are your blood pressures ever more than 140 on the top number (systolic) OR more   than 90 on the bottom number (diastolic), for example 140/90? Yes      How many servings of fruits and vegetables do you eat daily?  2-3    On average, how many sweetened beverages do you drink each day (Examples: soda, juice, sweet tea, etc.  Do NOT count diet or artificially sweetened beverages)?   0    How many days per week do you exercise enough to make your heart beat faster? 3 or less    How many minutes a day do you exercise enough to make your  heart beat faster? 9 or less    How many days per week do you miss taking your medication? 0    Other problems:   1.  Pulmonary fibrosis: She is on some treatment per pulmonary, not really much change in symptoms yet    Current Concerns:     Edema: She has been having persistent edema for a month or so.  Left bothers a little more than the right.    Patient Active Problem List   Diagnosis     Morbid obesity (H)     Benign essential hypertension     Osteopenia, unspecified location     Hyperlipidemia LDL goal <130     IPF (idiopathic pulmonary fibrosis) (H)     NOVA (obstructive sleep apnea)       Current Outpatient Medications   Medication Sig Dispense Refill     albuterol (PROAIR HFA/PROVENTIL HFA/VENTOLIN HFA) 108 (90 Base) MCG/ACT inhaler Inhale 2 puffs into the lungs every 6 hours       calcium-vitamin D (CALTRATE) 600-400 MG-UNIT per tablet Take 1 tablet by mouth 2 times daily       fluticasone-vilanterol (BREO ELLIPTA) 200-25 MCG/INH inhaler Inhale 1 puff into the lungs daily       furosemide (LASIX) 40 MG tablet Take 1 tablet (40 mg) by mouth daily 90 tablet 1     irbesartan (AVAPRO) 300 MG tablet TAKE 1 TABLET(300 MG) BY MOUTH DAILY 90 tablet 1     nintedanib (OFEV) 100 MG capsule Take 1 capsule (100 mg) by mouth 2 times daily       OFEV 150 MG capsule Take 150 mg by mouth 2 times daily       omeprazole (PRILOSEC) 40 MG DR capsule Take 1 capsule (40 mg) by mouth daily       potassium chloride ER (K-TAB) 20 MEQ CR tablet Take 1 tablet (20 mEq) by mouth daily 90 tablet 1     diltiazem ER COATED BEADS (CARDIZEM CD/CARTIA XT) 300 MG 24 hr capsule TAKE 1 CAPSULE(300 MG) BY MOUTH DAILY 90 capsule 0         Social History     Tobacco Use     Smoking status: Never Smoker     Smokeless tobacco: Never Used   Substance Use Topics     Alcohol use: Yes     Comment: Occas          Review of Systems   No fever, chills, chest pain, continued dyspnea, edema as above      Objective    BP (!) 163/84 (BP Location: Right arm,  "Patient Position: Sitting, Cuff Size: Adult Large)   Pulse 104   Temp 98.2  F (36.8  C) (Oral)   Resp 14   Ht 1.676 m (5' 6\")   Wt 128.5 kg (283 lb 4.8 oz)   SpO2 (!) 83%   BMI 45.73 kg/m    Body mass index is 45.73 kg/m .  Physical Exam     Legs: 2+ edema              "

## 2021-08-04 LAB
ANION GAP SERPL CALCULATED.3IONS-SCNC: 4 MMOL/L (ref 3–14)
BUN SERPL-MCNC: 13 MG/DL (ref 7–30)
CALCIUM SERPL-MCNC: 9.3 MG/DL (ref 8.5–10.1)
CHLORIDE BLD-SCNC: 106 MMOL/L (ref 94–109)
CO2 SERPL-SCNC: 28 MMOL/L (ref 20–32)
CREAT SERPL-MCNC: 0.69 MG/DL (ref 0.52–1.04)
GFR SERPL CREATININE-BSD FRML MDRD: >90 ML/MIN/1.73M2
GLUCOSE BLD-MCNC: 127 MG/DL (ref 70–99)
POTASSIUM BLD-SCNC: 4.6 MMOL/L (ref 3.4–5.3)
SODIUM SERPL-SCNC: 138 MMOL/L (ref 133–144)

## 2021-08-05 RX ORDER — DILTIAZEM HYDROCHLORIDE 300 MG/1
CAPSULE, COATED, EXTENDED RELEASE ORAL
Qty: 90 CAPSULE | Refills: 0 | Status: SHIPPED | OUTPATIENT
Start: 2021-08-05 | End: 2021-11-30

## 2021-08-30 DIAGNOSIS — I10 BENIGN ESSENTIAL HYPERTENSION: ICD-10-CM

## 2021-09-01 RX ORDER — DILTIAZEM HYDROCHLORIDE 300 MG/1
CAPSULE, COATED, EXTENDED RELEASE ORAL
Qty: 90 CAPSULE | Refills: 0 | OUTPATIENT
Start: 2021-09-01

## 2021-09-01 NOTE — TELEPHONE ENCOUNTER
90 days recently sent  Too soon to refill  E-Prescribing Status: Receipt confirmed by pharmacy (8/5/2021  1:47 PM CDT)    Franky Patrick RN, BSN

## 2021-09-08 ENCOUNTER — TRANSFERRED RECORDS (OUTPATIENT)
Dept: HEALTH INFORMATION MANAGEMENT | Facility: CLINIC | Age: 66
End: 2021-09-08

## 2021-10-06 ENCOUNTER — LAB (OUTPATIENT)
Dept: LAB | Facility: CLINIC | Age: 66
End: 2021-10-06
Payer: COMMERCIAL

## 2021-10-06 DIAGNOSIS — J84.112 IPF (IDIOPATHIC PULMONARY FIBROSIS) (H): ICD-10-CM

## 2021-10-06 DIAGNOSIS — R60.0 LOCALIZED EDEMA: ICD-10-CM

## 2021-10-06 DIAGNOSIS — I10 BENIGN ESSENTIAL HYPERTENSION: ICD-10-CM

## 2021-10-06 LAB
ALBUMIN SERPL-MCNC: 3.5 G/DL (ref 3.4–5)
ALP SERPL-CCNC: 101 U/L (ref 40–150)
ALT SERPL W P-5'-P-CCNC: 57 U/L (ref 0–50)
ANION GAP SERPL CALCULATED.3IONS-SCNC: 7 MMOL/L (ref 3–14)
AST SERPL W P-5'-P-CCNC: 40 U/L (ref 0–45)
BILIRUB DIRECT SERPL-MCNC: 0.1 MG/DL (ref 0–0.2)
BILIRUB SERPL-MCNC: 0.4 MG/DL (ref 0.2–1.3)
BUN SERPL-MCNC: 16 MG/DL (ref 7–30)
CALCIUM SERPL-MCNC: 9 MG/DL (ref 8.5–10.1)
CHLORIDE BLD-SCNC: 106 MMOL/L (ref 94–109)
CO2 SERPL-SCNC: 28 MMOL/L (ref 20–32)
CREAT SERPL-MCNC: 0.72 MG/DL (ref 0.52–1.04)
GFR SERPL CREATININE-BSD FRML MDRD: 88 ML/MIN/1.73M2
GLUCOSE BLD-MCNC: 160 MG/DL (ref 70–99)
POTASSIUM BLD-SCNC: 4.8 MMOL/L (ref 3.4–5.3)
PROT SERPL-MCNC: 8 G/DL (ref 6.8–8.8)
SODIUM SERPL-SCNC: 141 MMOL/L (ref 133–144)

## 2021-10-06 PROCEDURE — 82040 ASSAY OF SERUM ALBUMIN: CPT

## 2021-10-06 PROCEDURE — 80048 BASIC METABOLIC PNL TOTAL CA: CPT

## 2021-10-06 PROCEDURE — 36415 COLL VENOUS BLD VENIPUNCTURE: CPT

## 2021-10-13 ENCOUNTER — TRANSFERRED RECORDS (OUTPATIENT)
Dept: HEALTH INFORMATION MANAGEMENT | Facility: CLINIC | Age: 66
End: 2021-10-13

## 2021-10-25 DIAGNOSIS — R73.09 BLOOD GLUCOSE ABNORMAL: Primary | ICD-10-CM

## 2021-10-25 DIAGNOSIS — I10 BENIGN ESSENTIAL HYPERTENSION: ICD-10-CM

## 2021-10-25 NOTE — LETTER
[facility]  [address], [address]  [place], [place]  [address]                                            TEL:[phone]  FAX:[phone]      Mary E Weiland  [address] [place] [id number]      November 10, 2021    Dear Peyton       We have received a refill request from your pharmacy for your medication - irbesartan. Many medications require routine follow-up with your provider and a review of your chart indicates that you are due for a follow up appointment in February. We have sent a one time, 90 day refill to your pharmacy to allow you time to schedule an appointment.     Please call [phone] to schedule an appointment.  We look forward to seeing you in the near future.      Thank you,     [facility]

## 2021-10-26 RX ORDER — IRBESARTAN 300 MG/1
300 TABLET ORAL DAILY
Qty: 90 TABLET | Refills: 0 | Status: SHIPPED | OUTPATIENT
Start: 2021-10-26 | End: 2022-01-23

## 2021-10-26 NOTE — TELEPHONE ENCOUNTER
Routing refill request to provider for review/approval because:  Blood pressure out of protocol range

## 2021-11-09 NOTE — TELEPHONE ENCOUNTER
Patient states her BP at pulmonary last week was 132/80 and 3 weeks ago it was 138/82. Patient denies having any BP over 140/90 since last office visit and agrees to call back if it goes over this.

## 2021-11-19 ENCOUNTER — TRANSFERRED RECORDS (OUTPATIENT)
Dept: HEALTH INFORMATION MANAGEMENT | Facility: CLINIC | Age: 66
End: 2021-11-19
Payer: COMMERCIAL

## 2021-11-29 DIAGNOSIS — I10 BENIGN ESSENTIAL HYPERTENSION: ICD-10-CM

## 2021-11-30 RX ORDER — DILTIAZEM HYDROCHLORIDE 300 MG/1
CAPSULE, COATED, EXTENDED RELEASE ORAL
Qty: 90 CAPSULE | Refills: 0 | Status: SHIPPED | OUTPATIENT
Start: 2021-11-30 | End: 2022-02-22

## 2021-11-30 NOTE — TELEPHONE ENCOUNTER
Routing refill request to provider for review/approval because:  Labs out of range:    Blood pressure out of protocol range    Pending Prescriptions:                       Disp   Refills    diltiazem ER COATED BEADS (CARDIZEM CD/CAR*90 cap*0        Sig: TAKE 1 CAPSULE(300 MG) BY MOUTH DAILY

## 2021-12-14 ENCOUNTER — LAB (OUTPATIENT)
Dept: LAB | Facility: CLINIC | Age: 66
End: 2021-12-14
Payer: COMMERCIAL

## 2021-12-14 DIAGNOSIS — J84.112 IPF (IDIOPATHIC PULMONARY FIBROSIS) (H): ICD-10-CM

## 2021-12-14 DIAGNOSIS — E78.5 HYPERLIPIDEMIA LDL GOAL <130: ICD-10-CM

## 2021-12-14 DIAGNOSIS — R73.09 BLOOD GLUCOSE ABNORMAL: ICD-10-CM

## 2021-12-14 LAB
ALBUMIN SERPL-MCNC: 3.4 G/DL (ref 3.4–5)
ALP SERPL-CCNC: 92 U/L (ref 40–150)
ALT SERPL W P-5'-P-CCNC: 53 U/L (ref 0–50)
AST SERPL W P-5'-P-CCNC: 31 U/L (ref 0–45)
BILIRUB DIRECT SERPL-MCNC: 0.1 MG/DL (ref 0–0.2)
BILIRUB SERPL-MCNC: 0.6 MG/DL (ref 0.2–1.3)
CHOLEST SERPL-MCNC: 190 MG/DL
FASTING STATUS PATIENT QL REPORTED: YES
FASTING STATUS PATIENT QL REPORTED: YES
GLUCOSE BLD-MCNC: 122 MG/DL (ref 70–99)
HBA1C MFR BLD: 6.1 % (ref 0–5.6)
HDLC SERPL-MCNC: 46 MG/DL
LDLC SERPL CALC-MCNC: 117 MG/DL
NONHDLC SERPL-MCNC: 144 MG/DL
PROT SERPL-MCNC: 8 G/DL (ref 6.8–8.8)
TRIGL SERPL-MCNC: 135 MG/DL

## 2021-12-14 PROCEDURE — 80076 HEPATIC FUNCTION PANEL: CPT

## 2021-12-14 PROCEDURE — 36415 COLL VENOUS BLD VENIPUNCTURE: CPT

## 2021-12-14 PROCEDURE — 80061 LIPID PANEL: CPT

## 2021-12-14 PROCEDURE — 82947 ASSAY GLUCOSE BLOOD QUANT: CPT

## 2021-12-14 PROCEDURE — 83036 HEMOGLOBIN GLYCOSYLATED A1C: CPT

## 2022-01-07 ENCOUNTER — MEDICAL CORRESPONDENCE (OUTPATIENT)
Dept: HEALTH INFORMATION MANAGEMENT | Facility: CLINIC | Age: 67
End: 2022-01-07
Payer: COMMERCIAL

## 2022-01-10 DIAGNOSIS — R74.8 ELEVATED LIVER ENZYMES: Primary | ICD-10-CM

## 2022-01-21 DIAGNOSIS — I10 BENIGN ESSENTIAL HYPERTENSION: ICD-10-CM

## 2022-01-21 NOTE — TELEPHONE ENCOUNTER
Routing refill request to provider for review/approval because:  Labs out of range:    BP Readings from Last 3 Encounters:   08/03/21 (!) 163/84   06/14/21 (!) 154/77   03/16/21 114/64     Franky STEVENSON RN, BSN

## 2022-01-23 RX ORDER — IRBESARTAN 300 MG/1
TABLET ORAL
Qty: 90 TABLET | Refills: 0 | Status: SHIPPED | OUTPATIENT
Start: 2022-01-23 | End: 2022-02-22

## 2022-01-27 ENCOUNTER — LAB (OUTPATIENT)
Dept: LAB | Facility: CLINIC | Age: 67
End: 2022-01-27
Payer: COMMERCIAL

## 2022-01-27 DIAGNOSIS — J84.112 IPF (IDIOPATHIC PULMONARY FIBROSIS) (H): ICD-10-CM

## 2022-01-27 LAB
ALBUMIN SERPL-MCNC: 3.2 G/DL (ref 3.4–5)
ALP SERPL-CCNC: 93 U/L (ref 40–150)
ALT SERPL W P-5'-P-CCNC: 52 U/L (ref 0–50)
AST SERPL W P-5'-P-CCNC: 31 U/L (ref 0–45)
BILIRUB DIRECT SERPL-MCNC: 0.1 MG/DL (ref 0–0.2)
BILIRUB SERPL-MCNC: 0.6 MG/DL (ref 0.2–1.3)
PROT SERPL-MCNC: 7.6 G/DL (ref 6.8–8.8)

## 2022-01-27 PROCEDURE — 82040 ASSAY OF SERUM ALBUMIN: CPT

## 2022-01-27 PROCEDURE — 36415 COLL VENOUS BLD VENIPUNCTURE: CPT

## 2022-02-22 ENCOUNTER — OFFICE VISIT (OUTPATIENT)
Dept: INTERNAL MEDICINE | Facility: CLINIC | Age: 67
End: 2022-02-22
Payer: COMMERCIAL

## 2022-02-22 DIAGNOSIS — J84.112 IPF (IDIOPATHIC PULMONARY FIBROSIS) (H): ICD-10-CM

## 2022-02-22 DIAGNOSIS — I10 BENIGN ESSENTIAL HYPERTENSION: Primary | ICD-10-CM

## 2022-02-22 DIAGNOSIS — E78.5 HYPERLIPIDEMIA LDL GOAL <130: ICD-10-CM

## 2022-02-22 DIAGNOSIS — R60.0 LOCALIZED EDEMA: ICD-10-CM

## 2022-02-22 DIAGNOSIS — M85.80 OSTEOPENIA, UNSPECIFIED LOCATION: ICD-10-CM

## 2022-02-22 DIAGNOSIS — E66.01 MORBID OBESITY (H): ICD-10-CM

## 2022-02-22 DIAGNOSIS — G47.33 OSA (OBSTRUCTIVE SLEEP APNEA): ICD-10-CM

## 2022-02-22 PROCEDURE — 99214 OFFICE O/P EST MOD 30 MIN: CPT | Performed by: INTERNAL MEDICINE

## 2022-02-22 RX ORDER — DILTIAZEM HYDROCHLORIDE 300 MG/1
300 CAPSULE, COATED, EXTENDED RELEASE ORAL DAILY
Qty: 90 CAPSULE | Refills: 3 | Status: SHIPPED | OUTPATIENT
Start: 2022-02-22 | End: 2023-02-14

## 2022-02-22 RX ORDER — IRBESARTAN 300 MG/1
300 TABLET ORAL DAILY
Qty: 90 TABLET | Refills: 3 | Status: SHIPPED | OUTPATIENT
Start: 2022-02-22 | End: 2023-04-19

## 2022-02-22 RX ORDER — POTASSIUM CHLORIDE 1500 MG/1
20 TABLET, EXTENDED RELEASE ORAL DAILY
Qty: 90 TABLET | Refills: 3 | Status: SHIPPED | OUTPATIENT
Start: 2022-02-22 | End: 2023-03-15

## 2022-02-22 RX ORDER — FUROSEMIDE 40 MG
40 TABLET ORAL DAILY
Qty: 90 TABLET | Refills: 3 | Status: SHIPPED | OUTPATIENT
Start: 2022-02-22 | End: 2023-03-15

## 2022-02-22 NOTE — PROGRESS NOTES
Assessment & Plan     Benign essential hypertension  Home readings are well controlled, continue medication  - diltiazem ER COATED BEADS (CARDIZEM CD/CARTIA XT) 300 MG 24 hr capsule; Take 1 capsule (300 mg) by mouth daily  - irbesartan (AVAPRO) 300 MG tablet; Take 1 tablet (300 mg) by mouth daily    IPF (idiopathic pulmonary fibrosis) (H)  Progressive changes, continue following with pulmonary  - nintedanib (OFEV) 100 MG capsule; Take 1 capsule (100 mg) by mouth daily    Morbid obesity (H)  Try to work on diet is much as possible      Localized edema  Overall stable, likely related to pulmonary hypertension.  Continue diuretic.  - potassium chloride ER (K-TAB) 20 MEQ CR tablet; Take 1 tablet (20 mEq) by mouth daily  - furosemide (LASIX) 40 MG tablet; Take 1 tablet (40 mg) by mouth daily        Return in about 6 months (around 9/1/2022) for medication follow up.    Peyton Marin MD  [facility]    Alexandra Todd is a 66 year old who presents for the following health issues     HPI     Hypertension Follow-up  She reports her home blood pressure readings are 130-138/80-88.    Do you check your blood pressure regularly outside of the clinic? Yes     Are you following a low salt diet? Yes    Are your blood pressures ever more than 140 on the top number (systolic) OR more   than 90 on the bottom number (diastolic), for example 140/90? No      How many servings of fruits and vegetables do you eat daily?  2-3    On average, how many sweetened beverages do you drink each day (Examples: soda, juice, sweet tea, etc.  Do NOT count diet or artificially sweetened beverages)?   0    How many days per week do you exercise enough to make your heart beat faster? 3 or less    How many minutes a day do you exercise enough to make your heart beat faster? 10 - 19    How many days per week do you miss taking your medication? 0    Other problems:   1.  Pulmonary fibrosis: She has had some progressive worsening  dyspnea.  It takes very little for her to get short of breath.  Her medication had caused some increase in liver function tests in December so it was decreased to once daily.  She will have her labs rechecked next month.  She is not sure the medication is helped much.  2.  Morbid obesity: Weight is gone up, she is unable to exercise at all.  She is trying to watch her diet.    Current Concerns: none    Patient Active Problem List   Diagnosis     Morbid obesity (H)     Benign essential hypertension     Osteopenia, unspecified location     Hyperlipidemia LDL goal <130     IPF (idiopathic pulmonary fibrosis) (H)     NOVA (obstructive sleep apnea)       Current Outpatient Medications   Medication Sig Dispense Refill     albuterol (PROAIR HFA/PROVENTIL HFA/VENTOLIN HFA) 108 (90 Base) MCG/ACT inhaler Inhale 2 puffs into the lungs every 6 hours       calcium-vitamin D (CALTRATE) 600-400 MG-UNIT per tablet Take 1 tablet by mouth 2 times daily       diltiazem ER COATED BEADS (CARDIZEM CD/CARTIA XT) 300 MG 24 hr capsule TAKE 1 CAPSULE(300 MG) BY MOUTH DAILY 90 capsule 0     fluticasone-vilanterol (BREO ELLIPTA) 200-25 MCG/INH inhaler Inhale 1 puff into the lungs daily       furosemide (LASIX) 40 MG tablet Take 1 tablet (40 mg) by mouth daily 90 tablet 1     irbesartan (AVAPRO) 300 MG tablet TAKE 1 TABLET(300 MG) BY MOUTH DAILY 90 tablet 0     nintedanib (OFEV) 100 MG capsule Take 1 capsule (100 mg) by mouth 2 times daily       OFEV 150 MG capsule Take 150 mg by mouth 2 times daily       omeprazole (PRILOSEC) 40 MG DR capsule Take 1 capsule (40 mg) by mouth daily       potassium chloride ER (K-TAB) 20 MEQ CR tablet Take 1 tablet (20 mEq) by mouth daily 90 tablet 1         Social History     Tobacco Use     Smoking status: Never Smoker     Smokeless tobacco: Never Used   Substance Use Topics     Alcohol use: Yes     Comment: Occas          Review of Systems   No fever, chills, chest pain, palpitations, stable edema.  Gradually  "progressive dyspnea on exertion.  No abdominal concerns      Objective    /85 (BP Location: Left arm, Patient Position: Sitting, Cuff Size: Adult Large)   Pulse 96   Temp 98.7  F (37.1  C) (Oral)   Resp 14   Ht 1.676 m (5' 6\")   Wt 142.2 kg (313 lb 6.4 oz)   SpO2 (!) 88%   BMI 50.58 kg/m    Body mass index is 50.58 kg/m .  Physical Exam     Observation: She is working hard and breathing, not able to speak more than 1-2 sentences at a time.  Lungs: Crackles lower half.  CV: normal S1, S2 without murmur, S3 or S4.   2+ edema            "

## 2022-02-22 NOTE — NURSING NOTE
"BP (!) 150/84 (BP Location: Left arm, Patient Position: Sitting, Cuff Size: Adult Large)   Pulse 96   Temp 98.7  F (37.1  C) (Oral)   Resp 14   Ht 1.676 m (5' 6\")   Wt 142.2 kg (313 lb 6.4 oz)   SpO2 (!) 88%   BMI 50.58 kg/m      "

## 2022-02-27 VITALS
SYSTOLIC BLOOD PRESSURE: 135 MMHG | TEMPERATURE: 98.7 F | RESPIRATION RATE: 14 BRPM | DIASTOLIC BLOOD PRESSURE: 85 MMHG | BODY MASS INDEX: 47.09 KG/M2 | HEART RATE: 96 BPM | HEIGHT: 66 IN | OXYGEN SATURATION: 88 % | WEIGHT: 293 LBS

## 2022-03-08 ENCOUNTER — LAB (OUTPATIENT)
Dept: LAB | Facility: CLINIC | Age: 67
End: 2022-03-08
Payer: COMMERCIAL

## 2022-03-08 DIAGNOSIS — J84.112 IPF (IDIOPATHIC PULMONARY FIBROSIS) (H): ICD-10-CM

## 2022-03-08 LAB
ALBUMIN SERPL-MCNC: 3.3 G/DL (ref 3.4–5)
ALP SERPL-CCNC: 89 U/L (ref 40–150)
ALT SERPL W P-5'-P-CCNC: 51 U/L (ref 0–50)
AST SERPL W P-5'-P-CCNC: 34 U/L (ref 0–45)
BILIRUB DIRECT SERPL-MCNC: 0.1 MG/DL (ref 0–0.2)
BILIRUB SERPL-MCNC: 0.7 MG/DL (ref 0.2–1.3)
PROT SERPL-MCNC: 7.6 G/DL (ref 6.8–8.8)

## 2022-03-08 PROCEDURE — 80076 HEPATIC FUNCTION PANEL: CPT

## 2022-03-08 PROCEDURE — 36415 COLL VENOUS BLD VENIPUNCTURE: CPT

## 2022-04-11 ENCOUNTER — TRANSFERRED RECORDS (OUTPATIENT)
Dept: HEALTH INFORMATION MANAGEMENT | Facility: CLINIC | Age: 67
End: 2022-04-11
Payer: COMMERCIAL

## 2022-04-25 DIAGNOSIS — I10 BENIGN ESSENTIAL HYPERTENSION: ICD-10-CM

## 2022-04-27 RX ORDER — IRBESARTAN 300 MG/1
TABLET ORAL
Qty: 90 TABLET | Refills: 3 | OUTPATIENT
Start: 2022-04-27

## 2022-05-09 ENCOUNTER — LAB (OUTPATIENT)
Dept: LAB | Facility: CLINIC | Age: 67
End: 2022-05-09
Payer: COMMERCIAL

## 2022-05-09 DIAGNOSIS — J84.112 IDIOPATHIC PULMONARY FIBROSIS (H): ICD-10-CM

## 2022-05-09 LAB
ALBUMIN SERPL-MCNC: 3.7 G/DL (ref 3.4–5)
ALP SERPL-CCNC: 95 U/L (ref 40–150)
ALT SERPL W P-5'-P-CCNC: 52 U/L (ref 0–50)
AST SERPL W P-5'-P-CCNC: 34 U/L (ref 0–45)
BILIRUB DIRECT SERPL-MCNC: 0.2 MG/DL (ref 0–0.2)
BILIRUB SERPL-MCNC: 0.5 MG/DL (ref 0.2–1.3)
PROT SERPL-MCNC: 8.2 G/DL (ref 6.8–8.8)

## 2022-05-09 PROCEDURE — 80076 HEPATIC FUNCTION PANEL: CPT

## 2022-05-09 PROCEDURE — 36415 COLL VENOUS BLD VENIPUNCTURE: CPT

## 2022-07-13 ENCOUNTER — MEDICAL CORRESPONDENCE (OUTPATIENT)
Dept: HEALTH INFORMATION MANAGEMENT | Facility: CLINIC | Age: 67
End: 2022-07-13

## 2022-08-17 ENCOUNTER — LAB (OUTPATIENT)
Dept: LAB | Facility: CLINIC | Age: 67
End: 2022-08-17
Payer: COMMERCIAL

## 2022-08-17 DIAGNOSIS — J84.112 IPF (IDIOPATHIC PULMONARY FIBROSIS) (H): ICD-10-CM

## 2022-08-17 LAB
ALBUMIN SERPL BCG-MCNC: 3.9 G/DL (ref 3.5–5.2)
ALP SERPL-CCNC: 97 U/L (ref 35–104)
ALT SERPL W P-5'-P-CCNC: 50 U/L (ref 10–35)
AST SERPL W P-5'-P-CCNC: 41 U/L (ref 10–35)
BILIRUB DIRECT SERPL-MCNC: <0.2 MG/DL (ref 0–0.3)
BILIRUB SERPL-MCNC: 0.7 MG/DL
PROT SERPL-MCNC: 7.4 G/DL (ref 6.4–8.3)

## 2022-08-17 PROCEDURE — 36415 COLL VENOUS BLD VENIPUNCTURE: CPT

## 2022-08-17 PROCEDURE — 80076 HEPATIC FUNCTION PANEL: CPT

## 2022-09-09 ENCOUNTER — OFFICE VISIT (OUTPATIENT)
Dept: INTERNAL MEDICINE | Facility: CLINIC | Age: 67
End: 2022-09-09
Payer: COMMERCIAL

## 2022-09-09 VITALS
WEIGHT: 293 LBS | HEART RATE: 63 BPM | RESPIRATION RATE: 32 BRPM | OXYGEN SATURATION: 92 % | SYSTOLIC BLOOD PRESSURE: 118 MMHG | TEMPERATURE: 96.8 F | BODY MASS INDEX: 45.99 KG/M2 | HEIGHT: 67 IN | DIASTOLIC BLOOD PRESSURE: 70 MMHG

## 2022-09-09 DIAGNOSIS — Z23 NEED FOR PROPHYLACTIC VACCINATION AND INOCULATION AGAINST INFLUENZA: ICD-10-CM

## 2022-09-09 DIAGNOSIS — R73.09 BLOOD GLUCOSE ABNORMAL: ICD-10-CM

## 2022-09-09 DIAGNOSIS — J84.112 IPF (IDIOPATHIC PULMONARY FIBROSIS) (H): ICD-10-CM

## 2022-09-09 DIAGNOSIS — M85.80 OSTEOPENIA, UNSPECIFIED LOCATION: ICD-10-CM

## 2022-09-09 DIAGNOSIS — E78.5 HYPERLIPIDEMIA LDL GOAL <130: ICD-10-CM

## 2022-09-09 DIAGNOSIS — I10 BENIGN ESSENTIAL HYPERTENSION: Primary | ICD-10-CM

## 2022-09-09 DIAGNOSIS — Z23 NEED FOR VACCINATION: ICD-10-CM

## 2022-09-09 DIAGNOSIS — E66.01 MORBID OBESITY (H): ICD-10-CM

## 2022-09-09 PROCEDURE — G0008 ADMIN INFLUENZA VIRUS VAC: HCPCS | Performed by: INTERNAL MEDICINE

## 2022-09-09 PROCEDURE — 99214 OFFICE O/P EST MOD 30 MIN: CPT | Mod: 25 | Performed by: INTERNAL MEDICINE

## 2022-09-09 PROCEDURE — 90662 IIV NO PRSV INCREASED AG IM: CPT | Performed by: INTERNAL MEDICINE

## 2022-09-09 NOTE — PROGRESS NOTES
"  Assessment & Plan     Benign essential hypertension  Blood pressures have been well controlled, no symptoms related to any low blood pressure, will continue current medication.    - Basic metabolic panel  (Ca, Cl, CO2, Creat, Gluc, K, Na, BUN); Future  - Albumin Random Urine Quantitative with Creat Ratio; Future    IPF (idiopathic pulmonary fibrosis) (H)  Progressive, continue follow-up with pulmonary    Morbid obesity (H)  Overall stable, very limited in her activities    Hyperlipidemia LDL goal <130  Resolved.       Blood glucose abnormal  Recheck with next labs  - Basic metabolic panel  (Ca, Cl, CO2, Creat, Gluc, K, Na, BUN); Future  - Hemoglobin A1c; Future    Need for prophylactic vaccination and inoculation against influenza    - INFLUENZA, QUAD, HIGH DOSE, PF, 65YR + (FLUZONE HD)  - ADMIN INFLUENZA (For MEDICARE Patients ONLY) []    Need for vaccination        BMI:   Estimated body mass index is 48.87 kg/m  as calculated from the following:    Height as of this encounter: 1.702 m (5' 7\").    Weight as of this encounter: 141.5 kg (312 lb).   Weight management plan: none        Return in about 7 months (around 4/9/2023).    Peyton Marin MD  [facility]    Alexandra Todd is a 67 year old, presenting for the following health issues:      HPI   Problems:   1.  Hypertension: She reports good blood pressure readings at home and at pulmonary.  No concerns about her medication.  2.  Idiopathic pulmonary fibrosis: Gradually progressive, she is managed by pulmonary, not really responding much to her current treatment.  Her oxygen is currently at 6 L, still becomes quite hypoxic with activity.   3.  History of hyperlipidemia: Cholesterol has gradually come down over time, would not benefit from medication  4.  Morbid obesity: Weight is stable, feels she is watching her diet fairly well.      Patient Active Problem List   Diagnosis     Morbid obesity (H)     Benign essential hypertension " "    Osteopenia, unspecified location     IPF (idiopathic pulmonary fibrosis) (H)     NOVA (obstructive sleep apnea)     Current Outpatient Medications   Medication Sig Dispense Refill     albuterol (PROAIR HFA/PROVENTIL HFA/VENTOLIN HFA) 108 (90 Base) MCG/ACT inhaler Inhale 2 puffs into the lungs every 6 hours       calcium-vitamin D (CALTRATE) 600-400 MG-UNIT per tablet Take 1 tablet by mouth 2 times daily       diltiazem ER COATED BEADS (CARDIZEM CD/CARTIA XT) 300 MG 24 hr capsule Take 1 capsule (300 mg) by mouth daily 90 capsule 3     fluticasone-vilanterol (BREO ELLIPTA) 200-25 MCG/INH inhaler Inhale 1 puff into the lungs daily       furosemide (LASIX) 40 MG tablet Take 1 tablet (40 mg) by mouth daily 90 tablet 3     irbesartan (AVAPRO) 300 MG tablet Take 1 tablet (300 mg) by mouth daily 90 tablet 3     nintedanib (OFEV) 100 MG capsule Take 1 capsule (100 mg) by mouth daily       omeprazole (PRILOSEC) 40 MG DR capsule Take 1 capsule (40 mg) by mouth daily       potassium chloride ER (K-TAB) 20 MEQ CR tablet Take 1 tablet (20 mEq) by mouth daily 90 tablet 3      Social History     Tobacco Use     Smoking status: Never Smoker     Smokeless tobacco: Never Used   Vaping Use     Vaping Use: Never used   Substance Use Topics     Alcohol use: Yes     Comment: Occas     Drug use: No          Review of Systems   No fever, chills, chest pain, palpitations, lightheadedness, nausea, vomiting, abdominal pain.  Progressive dyspnea on exertion.  Occasional mild cough.      Objective    /70   Pulse 63   Temp 96.8  F (36  C) (Tympanic)   Resp (!) 32   Ht 1.702 m (5' 7\")   Wt 141.5 kg (312 lb)   SpO2 92%   BMI 48.87 kg/m    Body mass index is 48.87 kg/m .  Physical Exam     She is tachypneic but not in respiratory distress  Lungs: Diffuse crackles two thirds the way up  CV: Regular S1, S2                  "

## 2022-09-13 ENCOUNTER — MEDICAL CORRESPONDENCE (OUTPATIENT)
Dept: HEALTH INFORMATION MANAGEMENT | Facility: CLINIC | Age: 67
End: 2022-09-13

## 2022-09-21 ENCOUNTER — LAB (OUTPATIENT)
Dept: LAB | Facility: CLINIC | Age: 67
End: 2022-09-21
Payer: COMMERCIAL

## 2022-09-21 DIAGNOSIS — J84.112 IPF (IDIOPATHIC PULMONARY FIBROSIS) (H): ICD-10-CM

## 2022-09-21 LAB
ALBUMIN SERPL BCG-MCNC: 4 G/DL (ref 3.5–5.2)
ALP SERPL-CCNC: 100 U/L (ref 35–104)
ALT SERPL W P-5'-P-CCNC: 44 U/L (ref 10–35)
AST SERPL W P-5'-P-CCNC: 35 U/L (ref 10–35)
BILIRUB DIRECT SERPL-MCNC: <0.2 MG/DL (ref 0–0.3)
BILIRUB SERPL-MCNC: 0.4 MG/DL
PROT SERPL-MCNC: 7.2 G/DL (ref 6.4–8.3)

## 2022-09-21 PROCEDURE — 36415 COLL VENOUS BLD VENIPUNCTURE: CPT

## 2022-09-21 PROCEDURE — 80076 HEPATIC FUNCTION PANEL: CPT

## 2022-09-25 PROBLEM — E78.5 HYPERLIPIDEMIA LDL GOAL <130: Status: RESOLVED | Noted: 2021-02-16 | Resolved: 2022-09-25

## 2022-10-11 ENCOUNTER — TRANSFERRED RECORDS (OUTPATIENT)
Dept: HEALTH INFORMATION MANAGEMENT | Facility: CLINIC | Age: 67
End: 2022-10-11

## 2022-11-16 ENCOUNTER — LAB (OUTPATIENT)
Dept: LAB | Facility: CLINIC | Age: 67
End: 2022-11-16
Payer: COMMERCIAL

## 2022-11-16 DIAGNOSIS — R73.09 BLOOD GLUCOSE ABNORMAL: ICD-10-CM

## 2022-11-16 DIAGNOSIS — R74.8 ELEVATED LIVER ENZYMES: ICD-10-CM

## 2022-11-16 DIAGNOSIS — J84.112 IPF (IDIOPATHIC PULMONARY FIBROSIS) (H): ICD-10-CM

## 2022-11-16 DIAGNOSIS — I10 BENIGN ESSENTIAL HYPERTENSION: ICD-10-CM

## 2022-11-16 LAB
ALBUMIN SERPL BCG-MCNC: 4.1 G/DL (ref 3.5–5.2)
ALP SERPL-CCNC: 104 U/L (ref 35–104)
ALT SERPL W P-5'-P-CCNC: 61 U/L (ref 10–35)
ANION GAP SERPL CALCULATED.3IONS-SCNC: 10 MMOL/L (ref 7–15)
AST SERPL W P-5'-P-CCNC: 49 U/L (ref 10–35)
BILIRUB DIRECT SERPL-MCNC: <0.2 MG/DL (ref 0–0.3)
BILIRUB SERPL-MCNC: 0.6 MG/DL
BUN SERPL-MCNC: 18.3 MG/DL (ref 8–23)
CALCIUM SERPL-MCNC: 9.3 MG/DL (ref 8.8–10.2)
CHLORIDE SERPL-SCNC: 101 MMOL/L (ref 98–107)
CREAT SERPL-MCNC: 0.73 MG/DL (ref 0.51–0.95)
CREAT UR-MCNC: 242 MG/DL
DEPRECATED HCO3 PLAS-SCNC: 28 MMOL/L (ref 22–29)
GFR SERPL CREATININE-BSD FRML MDRD: 90 ML/MIN/1.73M2
GLUCOSE SERPL-MCNC: 196 MG/DL (ref 70–99)
HBA1C MFR BLD: 6.1 %
MICROALBUMIN UR-MCNC: 19.4 MG/L
MICROALBUMIN/CREAT UR: 8.02 MG/G CR (ref 0–25)
POTASSIUM SERPL-SCNC: 3.8 MMOL/L (ref 3.4–5.3)
PROT SERPL-MCNC: 7.5 G/DL (ref 6.4–8.3)
SODIUM SERPL-SCNC: 139 MMOL/L (ref 136–145)

## 2022-11-16 PROCEDURE — 82248 BILIRUBIN DIRECT: CPT

## 2022-11-16 PROCEDURE — 83036 HEMOGLOBIN GLYCOSYLATED A1C: CPT

## 2022-11-16 PROCEDURE — 36415 COLL VENOUS BLD VENIPUNCTURE: CPT

## 2022-11-16 PROCEDURE — 82043 UR ALBUMIN QUANTITATIVE: CPT

## 2022-11-16 PROCEDURE — 80053 COMPREHEN METABOLIC PANEL: CPT

## 2023-01-06 ENCOUNTER — TRANSFERRED RECORDS (OUTPATIENT)
Dept: HEALTH INFORMATION MANAGEMENT | Facility: CLINIC | Age: 68
End: 2023-01-06

## 2023-01-11 ENCOUNTER — LAB (OUTPATIENT)
Dept: LAB | Facility: CLINIC | Age: 68
End: 2023-01-11
Payer: COMMERCIAL

## 2023-01-11 DIAGNOSIS — J84.112 IPF (IDIOPATHIC PULMONARY FIBROSIS) (H): ICD-10-CM

## 2023-01-11 LAB
ALBUMIN SERPL BCG-MCNC: 4.3 G/DL (ref 3.5–5.2)
ALP SERPL-CCNC: 98 U/L (ref 35–104)
ALT SERPL W P-5'-P-CCNC: 40 U/L (ref 10–35)
AST SERPL W P-5'-P-CCNC: 32 U/L (ref 10–35)
BILIRUB DIRECT SERPL-MCNC: <0.2 MG/DL (ref 0–0.3)
BILIRUB SERPL-MCNC: 0.4 MG/DL
PROT SERPL-MCNC: 7.6 G/DL (ref 6.4–8.3)

## 2023-01-11 PROCEDURE — 80076 HEPATIC FUNCTION PANEL: CPT

## 2023-01-11 PROCEDURE — 36415 COLL VENOUS BLD VENIPUNCTURE: CPT

## 2023-01-26 ENCOUNTER — HOSPITAL ENCOUNTER (OUTPATIENT)
Dept: CT IMAGING | Facility: CLINIC | Age: 68
Discharge: HOME | End: 2023-01-26
Attending: INTERNAL MEDICINE | Admitting: INTERNAL MEDICINE
Payer: COMMERCIAL

## 2023-01-26 DIAGNOSIS — J84.9 INTERSTITIAL PULMONARY DISEASE, UNSPECIFIED (H): ICD-10-CM

## 2023-01-26 DIAGNOSIS — J84.112 IDIOPATHIC PULMONARY FIBROSIS (H): ICD-10-CM

## 2023-01-26 PROCEDURE — 71250 CT THORAX DX C-: CPT

## 2023-02-13 DIAGNOSIS — I10 BENIGN ESSENTIAL HYPERTENSION: ICD-10-CM

## 2023-02-14 RX ORDER — DILTIAZEM HYDROCHLORIDE 300 MG/1
CAPSULE, COATED, EXTENDED RELEASE ORAL
Qty: 90 CAPSULE | Refills: 0 | Status: SHIPPED | OUTPATIENT
Start: 2023-02-14 | End: 2023-05-26

## 2023-02-18 ENCOUNTER — HEALTH MAINTENANCE LETTER (OUTPATIENT)
Age: 68
End: 2023-02-18

## 2023-02-22 ENCOUNTER — TRANSFERRED RECORDS (OUTPATIENT)
Dept: HEALTH INFORMATION MANAGEMENT | Facility: CLINIC | Age: 68
End: 2023-02-22

## 2023-03-13 DIAGNOSIS — R60.0 LOCALIZED EDEMA: ICD-10-CM

## 2023-03-15 RX ORDER — POTASSIUM CHLORIDE 1500 MG/1
20 TABLET, EXTENDED RELEASE ORAL DAILY
Qty: 90 TABLET | Refills: 3 | Status: SHIPPED | OUTPATIENT
Start: 2023-03-15 | End: 2024-06-20

## 2023-03-15 RX ORDER — FUROSEMIDE 40 MG
40 TABLET ORAL DAILY
Qty: 90 TABLET | Refills: 3 | Status: SHIPPED | OUTPATIENT
Start: 2023-03-15 | End: 2024-05-02

## 2023-03-15 NOTE — TELEPHONE ENCOUNTER
Potassium Chloride Er 20 meq tab  Prescription approved per [facility] Refill Protocol.    Furosemide 40 mg tab  Prescription approved per [facility] Refill Protocol.    Appointments in Next Year    May 10, 2023 10:00 AM  (Arrive by 9:45 AM)  CT CHEST HI-RESOLUTION WO CONTRAST with [id number]  [facility] ([facility] ) [phone]   May 26, 2023 11:00 AM  (Arrive by 10:40 AM)  Annual Wellness Visit with Peyton Marin MD  [facility] ([facility] ) [phone]

## 2023-04-25 ENCOUNTER — LAB (OUTPATIENT)
Dept: LAB | Facility: CLINIC | Age: 68
End: 2023-04-25
Payer: COMMERCIAL

## 2023-04-25 DIAGNOSIS — J84.112 IPF (IDIOPATHIC PULMONARY FIBROSIS) (H): ICD-10-CM

## 2023-04-25 LAB
ALBUMIN SERPL BCG-MCNC: 4.2 G/DL (ref 3.5–5.2)
ALP SERPL-CCNC: 115 U/L (ref 35–104)
ALT SERPL W P-5'-P-CCNC: 36 U/L (ref 10–35)
AST SERPL W P-5'-P-CCNC: 32 U/L (ref 10–35)
BILIRUB DIRECT SERPL-MCNC: <0.2 MG/DL (ref 0–0.3)
BILIRUB SERPL-MCNC: 0.4 MG/DL
PROT SERPL-MCNC: 7.7 G/DL (ref 6.4–8.3)

## 2023-04-25 PROCEDURE — 36415 COLL VENOUS BLD VENIPUNCTURE: CPT

## 2023-04-25 PROCEDURE — 80076 HEPATIC FUNCTION PANEL: CPT

## 2023-04-27 ENCOUNTER — TRANSFERRED RECORDS (OUTPATIENT)
Dept: HEALTH INFORMATION MANAGEMENT | Facility: CLINIC | Age: 68
End: 2023-04-27
Payer: COMMERCIAL

## 2023-05-10 ENCOUNTER — HOSPITAL ENCOUNTER (OUTPATIENT)
Dept: CT IMAGING | Facility: CLINIC | Age: 68
Discharge: HOME | End: 2023-05-10
Attending: INTERNAL MEDICINE | Admitting: INTERNAL MEDICINE
Payer: COMMERCIAL

## 2023-05-10 DIAGNOSIS — J84.112 IDIOPATHIC PULMONARY FIBROSIS (H): ICD-10-CM

## 2023-05-10 PROCEDURE — 71250 CT THORAX DX C-: CPT

## 2023-05-24 ENCOUNTER — TRANSFERRED RECORDS (OUTPATIENT)
Dept: HEALTH INFORMATION MANAGEMENT | Facility: CLINIC | Age: 68
End: 2023-05-24
Payer: COMMERCIAL

## 2023-05-26 ENCOUNTER — OFFICE VISIT (OUTPATIENT)
Dept: INTERNAL MEDICINE | Facility: CLINIC | Age: 68
End: 2023-05-26
Payer: COMMERCIAL

## 2023-05-26 VITALS
SYSTOLIC BLOOD PRESSURE: 120 MMHG | OXYGEN SATURATION: 90 % | BODY MASS INDEX: 45.67 KG/M2 | TEMPERATURE: 98.7 F | WEIGHT: 291 LBS | HEART RATE: 72 BPM | HEIGHT: 67 IN | DIASTOLIC BLOOD PRESSURE: 62 MMHG | RESPIRATION RATE: 32 BRPM

## 2023-05-26 DIAGNOSIS — R73.09 BLOOD GLUCOSE ABNORMAL: ICD-10-CM

## 2023-05-26 DIAGNOSIS — Z00.00 ENCOUNTER FOR ANNUAL WELLNESS EXAM IN MEDICARE PATIENT: Primary | ICD-10-CM

## 2023-05-26 DIAGNOSIS — J84.112 IPF (IDIOPATHIC PULMONARY FIBROSIS) (H): ICD-10-CM

## 2023-05-26 DIAGNOSIS — E66.01 MORBID OBESITY (H): ICD-10-CM

## 2023-05-26 DIAGNOSIS — I10 BENIGN ESSENTIAL HYPERTENSION: ICD-10-CM

## 2023-05-26 PROCEDURE — G0438 PPPS, INITIAL VISIT: HCPCS | Performed by: INTERNAL MEDICINE

## 2023-05-26 PROCEDURE — 99214 OFFICE O/P EST MOD 30 MIN: CPT | Mod: 25 | Performed by: INTERNAL MEDICINE

## 2023-05-26 RX ORDER — DILTIAZEM HYDROCHLORIDE 300 MG/1
300 CAPSULE, COATED, EXTENDED RELEASE ORAL DAILY
Qty: 90 CAPSULE | Refills: 3 | Status: SHIPPED | OUTPATIENT
Start: 2023-05-26 | End: 2024-05-22

## 2023-05-26 RX ORDER — IPRATROPIUM BROMIDE AND ALBUTEROL SULFATE 2.5; .5 MG/3ML; MG/3ML
SOLUTION RESPIRATORY (INHALATION)
COMMUNITY
Start: 2023-05-25 | End: 2023-10-09

## 2023-05-26 RX ORDER — PREDNISONE 10 MG/1
TABLET ORAL
COMMUNITY
Start: 2023-05-20 | End: 2023-10-09

## 2023-05-26 RX ORDER — IRBESARTAN 300 MG/1
300 TABLET ORAL DAILY
Qty: 90 TABLET | Refills: 3 | Status: SHIPPED | OUTPATIENT
Start: 2023-05-26 | End: 2024-01-02

## 2023-05-26 RX ORDER — BENZONATATE 200 MG/1
CAPSULE ORAL
COMMUNITY
Start: 2023-05-24 | End: 2023-06-22

## 2023-05-26 ASSESSMENT — ENCOUNTER SYMPTOMS
ABDOMINAL PAIN: 0
PARESTHESIAS: 0
CONSTIPATION: 0
ARTHRALGIAS: 0
HEMATOCHEZIA: 0
COUGH: 1
CHILLS: 0
HEADACHES: 0
EYE PAIN: 0
NERVOUS/ANXIOUS: 0
JOINT SWELLING: 0
SORE THROAT: 0
BREAST MASS: 0
MYALGIAS: 0
FEVER: 0
DIARRHEA: 0
WEAKNESS: 0
NAUSEA: 1
DYSURIA: 0
PALPITATIONS: 0
FREQUENCY: 0
HEARTBURN: 0
DIZZINESS: 0
SHORTNESS OF BREATH: 1
HEMATURIA: 0

## 2023-05-26 ASSESSMENT — ACTIVITIES OF DAILY LIVING (ADL)
CURRENT_FUNCTION: HOUSEWORK REQUIRES ASSISTANCE
CURRENT_FUNCTION: PREPARING MEALS REQUIRES ASSISTANCE
CURRENT_FUNCTION: BATHING REQUIRES ASSISTANCE

## 2023-05-26 NOTE — NURSING NOTE
"Chief Complaint   Patient presents with     Medicare Visit     Non fasting     initial /62   Pulse 72   Temp 98.7  F (37.1  C) (Tympanic)   Resp (!) 32   Ht 1.702 m (5' 7\")   Wt 132 kg (291 lb)   SpO2 90%   BMI 45.58 kg/m   Estimated body mass index is 45.58 kg/m  as calculated from the following:    Height as of this encounter: 1.702 m (5' 7\").    Weight as of this encounter: 132 kg (291 lb)..  bp completed using cuff size large  PORTILLO WILDER LPN  "

## 2023-05-26 NOTE — PROGRESS NOTES
"SUBJECTIVE:   Peyton is a 68 year old who presents for Preventive Visit.      5/26/2023    10:39 AM   Additional Questions   Roomed by Marychuy ARANA   Patient has been advised of split billing requirements and indicates understanding: Yes  Are you in the first 12 months of your Medicare coverage?  No    Healthy Habits:     In general, how would you rate your overall health?  Poor    Frequency of exercise:  None    Do you usually eat at least 4 servings of fruit and vegetables a day, include whole grains    & fiber and avoid regularly eating high fat or \"junk\" foods?  Yes    Taking medications regularly:  Yes    Medication side effects:  Other    Ability to successfully perform activities of daily living:  Preparing meals requires assistance, housework requires assistance and bathing requires assistance    Home Safety:  No safety concerns identified    Hearing Impairment:  Need to ask people to speak up or repeat themselves    In the past 6 months, have you been bothered by leaking of urine?  No    In general, how would you rate your overall mental or emotional health?  Fair      PHQ-2 Total Score: 1    Additional concerns today:  No    Problems:  1.  Pulmonary fibrosis: She continues on high-dose oxygen, has chronic dyspnea.  She had an exacerbation and was treated with prednisone and antibiotics, she is just finishing her antibiotics today.  She is still on some prednisone.  Her nebulizer do seem to be helping.  Pulmonary saw her earlier this week and is going to continue the prednisone a little bit longer have her continue with Mucinex.  She has had continued gradual progression of her disease without much benefit from treatments.  Her medication dose had been decreased due to some elevated liver functions, did see GI who felt she could tolerate the higher dose again so that was restarted but has not yet seen any change.    2.  Morbid obesity: Unable to do any exercise, not really candidate for medication    3.  " Hypertension: Well-controlled    4.  Osteopenia: We have elected to hold off on repeat DEXA.    5.  Abnormal blood glucose: She would like to have this rechecked.      Patient Active Problem List   Diagnosis     Morbid obesity (H)     Benign essential hypertension     Osteopenia, unspecified location     IPF (idiopathic pulmonary fibrosis) (H)     NOVA (obstructive sleep apnea)     Current Outpatient Medications   Medication Sig Dispense Refill     albuterol (PROAIR HFA/PROVENTIL HFA/VENTOLIN HFA) 108 (90 Base) MCG/ACT inhaler Inhale 2 puffs into the lungs every 6 hours       amoxicillin-clavulanate (AUGMENTIN) 875-125 MG tablet TAKE 1 TABLET BY MOUTH EVERY 12 HOURS FOR 1 WEEK       benzonatate (TESSALON) 200 MG capsule TAKE 1 CAPSULE BY MOUTH THREE TIMES DAILY EVERY DAY FOR 1 WEEK       calcium-vitamin D (CALTRATE) 600-400 MG-UNIT per tablet Take 1 tablet by mouth 2 times daily       diltiazem ER COATED BEADS (CARDIZEM CD/CARTIA XT) 300 MG 24 hr capsule Take 1 capsule (300 mg) by mouth daily 90 capsule 3     fluticasone-vilanterol (BREO ELLIPTA) 200-25 MCG/INH inhaler Inhale 1 puff into the lungs daily       furosemide (LASIX) 40 MG tablet Take 1 tablet (40 mg) by mouth daily 90 tablet 3     ipratropium - albuterol 0.5 mg/2.5 mg/3 mL (DUONEB) 0.5-2.5 (3) MG/3ML neb solution        irbesartan (AVAPRO) 300 MG tablet Take 1 tablet (300 mg) by mouth daily 90 tablet 3     nintedanib (OFEV) 100 MG capsule Take 150 mg by mouth 2 times daily       omeprazole (PRILOSEC) 40 MG DR capsule Take 1 capsule (40 mg) by mouth daily       potassium chloride ER (K-TAB) 20 MEQ CR tablet Take 1 tablet (20 mEq) by mouth daily 90 tablet 3     predniSONE (DELTASONE) 10 MG tablet Pt taking 5 tabs every day then 4 tabs starting tomorrow x 1 week.          Have you ever done Advance Care Planning? (For example, a Health Directive, POLST, or a discussion with a medical provider or your loved ones about your wishes): Yes, patient states has an  Advance Care Planning document and will bring a copy to the clinic.       Fall risk  Fallen 2 or more times in the past year?: No  Any fall with injury in the past year?: No    Cognitive Screening   1) Repeat 3 items (Leader, Season, Table)    2) Clock draw: NORMAL  3) 3 item recall: Recalls 3 objects  Results: 3 items recalled: COGNITIVE IMPAIRMENT LESS LIKELY    Mini-CogTM Copyright RADHA Martini. Licensed by the author for use in [facility]; reprinted with permission (curtis@[facility]). All rights reserved.      Do you have sleep apnea, excessive snoring or daytime drowsiness?: yes sleep apnea uses bi pap machine    Reviewed and updated as needed this visit by clinical staff                  Reviewed and updated as needed this visit by Provider                 Social History     Tobacco Use     Smoking status: Never     Smokeless tobacco: Never   Vaping Use     Vaping status: Never Used   Substance Use Topics     Alcohol use: Yes     Comment: Aissatou             5/26/2023    10:37 AM   Alcohol Use   Prescreen: >3 drinks/day or >7 drinks/week? No     Do you have a current opioid prescription? No  Do you use any other controlled substances or medications that are not prescribed by a provider? None              Current providers sharing in care for this patient include:   Patient Care Team:  Peyton Marin MD as PCP - General (Internal Medicine)  Peyton Marin MD as Assigned PCP    The following health maintenance items are reviewed in Epic and correct as of today:  Health Maintenance   Topic Date Due     DEXA  Never done     ANNUAL REVIEW OF HM ORDERS  Never done     ADVANCE CARE PLANNING  Never done     MAMMO SCREENING  Never done     HEPATITIS C SCREENING  Never done     ZOSTER IMMUNIZATION (1 of 2) Never done     MEDICARE ANNUAL WELLNESS VISIT  Never done     COLORECTAL CANCER SCREENING  12/31/2022     COVID-19 Vaccine (6 - Moderna series) 01/26/2023     FALL RISK ASSESSMENT  05/26/2024     LIPID  12/14/2026  "    DTAP/TDAP/TD IMMUNIZATION (3 - Td or Tdap) 09/12/2028     PHQ-2 (once per calendar year)  Completed     INFLUENZA VACCINE  Completed     Pneumococcal Vaccine: 65+ Years  Completed     IPV IMMUNIZATION  Aged Out     MENINGITIS IMMUNIZATION  Aged Out     PAP  Discontinued               5/26/2023    10:37 AM   Breast CA Risk Assessment (FHS-7)   Do you have a family history of breast, colon, or ovarian cancer? No / Unknown     Review of Systems   Constitutional: Negative for chills and fever.   HENT: Positive for congestion. Negative for ear pain, hearing loss and sore throat.    Eyes: Negative for pain and visual disturbance.   Respiratory: Positive for cough and shortness of breath.    Cardiovascular: Positive for peripheral edema. Negative for chest pain and palpitations.   Gastrointestinal: Positive for nausea. Negative for abdominal pain, constipation, diarrhea, heartburn and hematochezia.   Breasts:  Negative for tenderness, breast mass and discharge.   Genitourinary: Negative for dysuria, frequency, genital sores, hematuria, pelvic pain, urgency, vaginal bleeding and vaginal discharge.   Musculoskeletal: Negative for arthralgias, joint swelling and myalgias.   Skin: Negative for rash.   Neurological: Negative for dizziness, weakness, headaches and paresthesias.   Psychiatric/Behavioral: Negative for mood changes. The patient is not nervous/anxious.      Above symptoms are related to her recent bronchitis.  Nausea is infrequent.    OBJECTIVE:   /62   Pulse 72   Temp 98.7  F (37.1  C) (Tympanic)   Resp (!) 32   Ht 1.702 m (5' 7\")   Wt 132 kg (291 lb)   SpO2 90%   BMI 45.58 kg/m   Estimated body mass index is 48.87 kg/m  as calculated from the following:    Height as of 9/9/22: 1.702 m (5' 7\").    Weight as of 9/9/22: 141.5 kg (312 lb).  Physical Exam    Lungs: Diffuse crackles no wheezes  CV: Regular S1-S2 without murmurs  1-2+ edema        ASSESSMENT / PLAN:   (Z00.00) Encounter for annual " "wellness exam in Medicare patient  (primary encounter diagnosis)  Comment:   Plan:     (J84.112) IPF (idiopathic pulmonary fibrosis) (H)  Comment: Gradually progressive symptoms, is recovering from a bronchitis, continue follow-up with pulmonary  Plan:     (E66.01) Morbid obesity (H)  Comment: advised that morbid obesity is associated with health risks and losing weight could help improve  health including improving health problems including hypertension.  It is unlikely she will be able to do much to help improve this at this time.    Plan:     (I10) Benign essential hypertension  Comment: Well-controlled, continue medication  Plan: diltiazem ER COATED BEADS (CARDIZEM CD/CARTIA         XT) 300 MG 24 hr capsule, irbesartan (AVAPRO)         300 MG tablet, Basic metabolic panel  (Ca, Cl,         CO2, Creat, Gluc, K, Na, BUN)            (R73.09) Blood glucose abnormal  Comment: Recheck lab in about a month, that with the effects of prednisone will be less noticeable  Plan: Basic metabolic panel  (Ca, Cl, CO2, Creat,         Gluc, K, Na, BUN), Hemoglobin A1c              Patient has been advised of split billing requirements and indicates understanding: Yes      COUNSELING:  Reviewed preventive health counseling, as reflected in patient instructions      BMI:   Estimated body mass index is 48.87 kg/m  as calculated from the following:    Height as of 9/9/22: 1.702 m (5' 7\").    Weight as of 9/9/22: 141.5 kg (312 lb).   Weight management plan: Discussed healthy diet and exercise guidelines      She reports that she has never smoked. She has never used smokeless tobacco.      Appropriate preventive services were discussed with this patient, including applicable screening as appropriate for cardiovascular disease, diabetes, osteopenia/osteoporosis, and glaucoma.  As appropriate for age/gender, discussed screening for colorectal cancer, prostate cancer, breast cancer, and cervical cancer. Checklist reviewing preventive " services available has been given to the patient.    Reviewed patients plan of care and provided an AVS. The Basic Care Plan (routine screening as documented in Health Maintenance) for Peyton meets the Care Plan requirement. This Care Plan has been established and reviewed with the Patient.      Peyton Marin MD  [facility]    Identified Health Risks:    I have reviewed Opioid Use Disorder and Substance Use Disorder risk factors and made any needed referrals.

## 2023-06-01 ENCOUNTER — LAB (OUTPATIENT)
Dept: LAB | Facility: CLINIC | Age: 68
End: 2023-06-01
Payer: COMMERCIAL

## 2023-06-01 DIAGNOSIS — R79.89 ELEVATED LIVER FUNCTION TESTS: ICD-10-CM

## 2023-06-01 DIAGNOSIS — J84.112 IDIOPATHIC PULMONARY FIBROSIS (H): Primary | ICD-10-CM

## 2023-06-01 LAB
ALBUMIN SERPL BCG-MCNC: 3.9 G/DL (ref 3.5–5.2)
ALP SERPL-CCNC: 95 U/L (ref 35–104)
ALT SERPL W P-5'-P-CCNC: 71 U/L (ref 10–35)
AST SERPL W P-5'-P-CCNC: 37 U/L (ref 10–35)
BILIRUB DIRECT SERPL-MCNC: <0.2 MG/DL (ref 0–0.3)
BILIRUB SERPL-MCNC: 0.6 MG/DL
PROT SERPL-MCNC: 6.8 G/DL (ref 6.4–8.3)

## 2023-06-01 PROCEDURE — 86381 MITOCHONDRIAL ANTIBODY EACH: CPT

## 2023-06-01 PROCEDURE — 86038 ANTINUCLEAR ANTIBODIES: CPT

## 2023-06-01 PROCEDURE — 80076 HEPATIC FUNCTION PANEL: CPT

## 2023-06-01 PROCEDURE — 36415 COLL VENOUS BLD VENIPUNCTURE: CPT

## 2023-06-02 LAB
ANA PAT SER IF-IMP: ABNORMAL
ANA SER QL IF: POSITIVE
ANA TITR SER IF: ABNORMAL {TITER}
MITOCHONDRIA M2 IGG SER-ACNC: <1 U/ML

## 2023-06-07 ENCOUNTER — TRANSFERRED RECORDS (OUTPATIENT)
Dept: HEALTH INFORMATION MANAGEMENT | Facility: CLINIC | Age: 68
End: 2023-06-07
Payer: COMMERCIAL

## 2023-06-09 ENCOUNTER — HOSPITAL ENCOUNTER (OUTPATIENT)
Dept: CT IMAGING | Facility: CLINIC | Age: 68
Discharge: HOME | End: 2023-06-09
Attending: INTERNAL MEDICINE | Admitting: INTERNAL MEDICINE
Payer: COMMERCIAL

## 2023-06-09 DIAGNOSIS — R79.89 ELEVATED LFTS: ICD-10-CM

## 2023-06-09 PROCEDURE — 250N000011 HC RX IP 250 OP 636: Performed by: INTERNAL MEDICINE

## 2023-06-09 PROCEDURE — 74160 CT ABDOMEN W/CONTRAST: CPT

## 2023-06-09 PROCEDURE — 250N000009 HC RX 250: Performed by: INTERNAL MEDICINE

## 2023-06-09 RX ORDER — IOPAMIDOL 755 MG/ML
500 INJECTION, SOLUTION INTRAVASCULAR ONCE
Status: COMPLETED | OUTPATIENT
Start: 2023-06-09 | End: 2023-06-09

## 2023-06-09 RX ADMIN — SODIUM CHLORIDE 53 ML: 9 INJECTION, SOLUTION INTRAVENOUS at 10:11

## 2023-06-09 RX ADMIN — IOPAMIDOL 100 ML: 755 INJECTION, SOLUTION INTRAVENOUS at 10:11

## 2023-06-12 ENCOUNTER — TRANSFERRED RECORDS (OUTPATIENT)
Dept: HEALTH INFORMATION MANAGEMENT | Facility: CLINIC | Age: 68
End: 2023-06-12
Payer: COMMERCIAL

## 2023-06-20 ENCOUNTER — TELEPHONE (OUTPATIENT)
Dept: INTERNAL MEDICINE | Facility: CLINIC | Age: 68
End: 2023-06-20
Payer: COMMERCIAL

## 2023-06-20 NOTE — TELEPHONE ENCOUNTER
Patient is calling because she is interested in getting a motorized scooter due to her lung issues. She is wondering if this is something Dr Marin can help her with or if she should ask her lung doctor instead.

## 2023-06-22 NOTE — TELEPHONE ENCOUNTER
She is going to wait for now. She wants to check into a medical supply store first and see what is involved. She may call back later if decides to get the referral for PT mobility assessment.

## 2023-06-22 NOTE — TELEPHONE ENCOUNTER
She would need to go through the seating assessment.  If she wants to do that I would do a referral.

## 2023-06-27 ENCOUNTER — HOSPITAL ENCOUNTER (OUTPATIENT)
Dept: PET IMAGING | Facility: CLINIC | Age: 68
Discharge: HOME | End: 2023-06-27
Attending: INTERNAL MEDICINE
Payer: COMMERCIAL

## 2023-06-27 ENCOUNTER — HOSPITAL ENCOUNTER (OUTPATIENT)
Dept: CT IMAGING | Facility: CLINIC | Age: 68
Discharge: HOME | End: 2023-06-27
Attending: INTERNAL MEDICINE
Payer: COMMERCIAL

## 2023-06-27 DIAGNOSIS — J84.112 IDIOPATHIC PULMONARY FIBROSIS (H): ICD-10-CM

## 2023-06-27 DIAGNOSIS — I27.29 OTHER SECONDARY PULMONARY HYPERTENSION (H): ICD-10-CM

## 2023-06-27 DIAGNOSIS — R91.8 OTHER NONSPECIFIC ABNORMAL FINDING OF LUNG FIELD: ICD-10-CM

## 2023-06-27 PROCEDURE — A9552 F18 FDG: HCPCS | Performed by: INTERNAL MEDICINE

## 2023-06-27 PROCEDURE — 78816 PET IMAGE W/CT FULL BODY: CPT | Mod: PI

## 2023-06-27 PROCEDURE — 71250 CT THORAX DX C-: CPT | Mod: XU

## 2023-06-27 PROCEDURE — 343N000001 HC RX 343: Performed by: INTERNAL MEDICINE

## 2023-06-27 RX ADMIN — FLUDEOXYGLUCOSE F-18 12.1 MILLICURIE: 500 INJECTION, SOLUTION INTRAVENOUS at 12:03

## 2023-07-05 ENCOUNTER — LAB (OUTPATIENT)
Dept: LAB | Facility: CLINIC | Age: 68
End: 2023-07-05
Payer: COMMERCIAL

## 2023-07-05 DIAGNOSIS — J84.112 IPF (IDIOPATHIC PULMONARY FIBROSIS) (H): ICD-10-CM

## 2023-07-05 DIAGNOSIS — R73.09 BLOOD GLUCOSE ABNORMAL: ICD-10-CM

## 2023-07-05 DIAGNOSIS — I10 BENIGN ESSENTIAL HYPERTENSION: ICD-10-CM

## 2023-07-05 LAB
ALBUMIN SERPL BCG-MCNC: 4.1 G/DL (ref 3.5–5.2)
ALP SERPL-CCNC: 113 U/L (ref 35–104)
ALT SERPL W P-5'-P-CCNC: 41 U/L (ref 0–50)
ANION GAP SERPL CALCULATED.3IONS-SCNC: 8 MMOL/L (ref 7–15)
AST SERPL W P-5'-P-CCNC: 39 U/L (ref 0–45)
BILIRUB DIRECT SERPL-MCNC: <0.2 MG/DL (ref 0–0.3)
BILIRUB SERPL-MCNC: 0.3 MG/DL
BUN SERPL-MCNC: 12.8 MG/DL (ref 8–23)
CALCIUM SERPL-MCNC: 9.1 MG/DL (ref 8.8–10.2)
CHLORIDE SERPL-SCNC: 101 MMOL/L (ref 98–107)
CREAT SERPL-MCNC: 0.69 MG/DL (ref 0.51–0.95)
DEPRECATED HCO3 PLAS-SCNC: 29 MMOL/L (ref 22–29)
GFR SERPL CREATININE-BSD FRML MDRD: >90 ML/MIN/1.73M2
GLUCOSE SERPL-MCNC: 149 MG/DL (ref 70–99)
HBA1C MFR BLD: 8.5 %
POTASSIUM SERPL-SCNC: 4 MMOL/L (ref 3.4–5.3)
PROT SERPL-MCNC: 7.1 G/DL (ref 6.4–8.3)
SODIUM SERPL-SCNC: 138 MMOL/L (ref 136–145)

## 2023-07-05 PROCEDURE — 80053 COMPREHEN METABOLIC PANEL: CPT

## 2023-07-05 PROCEDURE — 83036 HEMOGLOBIN GLYCOSYLATED A1C: CPT

## 2023-07-05 PROCEDURE — 82248 BILIRUBIN DIRECT: CPT

## 2023-07-05 PROCEDURE — 36415 COLL VENOUS BLD VENIPUNCTURE: CPT

## 2023-07-17 ENCOUNTER — TRANSFERRED RECORDS (OUTPATIENT)
Dept: HEALTH INFORMATION MANAGEMENT | Facility: CLINIC | Age: 68
End: 2023-07-17

## 2023-07-19 ENCOUNTER — TELEPHONE (OUTPATIENT)
Dept: INTERNAL MEDICINE | Facility: CLINIC | Age: 68
End: 2023-07-19
Payer: COMMERCIAL

## 2023-07-19 NOTE — TELEPHONE ENCOUNTER
FYI - Status Update    Who is Calling: patient    Update: Pt is needing to have labs results sent to pulmonologist spenser ANGUIANO provider has not received any.      Does caller want a call/response back: Yes     Could we send this information to you in [employer] or would you prefer to receive a phone call?:   Patient would prefer a phone call   Okay to leave a detailed message?: Yes at Home number on file [phone] (home)

## 2023-08-09 ENCOUNTER — HOSPITAL ENCOUNTER (OUTPATIENT)
Dept: CARDIOLOGY | Facility: CLINIC | Age: 68
Discharge: HOME | End: 2023-08-09
Attending: INTERNAL MEDICINE | Admitting: INTERNAL MEDICINE
Payer: COMMERCIAL

## 2023-08-09 DIAGNOSIS — R06.00 DYSPNEA: ICD-10-CM

## 2023-08-09 DIAGNOSIS — I27.81 CHRONIC COR PULMONALE (H): ICD-10-CM

## 2023-08-09 DIAGNOSIS — R60.9 EDEMA: ICD-10-CM

## 2023-08-09 DIAGNOSIS — I50.30 DIASTOLIC HEART FAILURE (H): ICD-10-CM

## 2023-08-09 LAB — LVEF ECHO: NORMAL

## 2023-08-09 PROCEDURE — 93306 TTE W/DOPPLER COMPLETE: CPT | Mod: 26 | Performed by: INTERNAL MEDICINE

## 2023-08-09 PROCEDURE — 93306 TTE W/DOPPLER COMPLETE: CPT

## 2023-09-11 ENCOUNTER — MYC MEDICAL ADVICE (OUTPATIENT)
Dept: INTERNAL MEDICINE | Facility: CLINIC | Age: 68
End: 2023-09-11
Payer: COMMERCIAL

## 2023-09-11 DIAGNOSIS — E11.9 TYPE 2 DIABETES MELLITUS WITHOUT COMPLICATION, WITHOUT LONG-TERM CURRENT USE OF INSULIN (H): Primary | ICD-10-CM

## 2023-09-12 ENCOUNTER — TRANSFERRED RECORDS (OUTPATIENT)
Dept: HEALTH INFORMATION MANAGEMENT | Facility: CLINIC | Age: 68
End: 2023-09-12
Payer: COMMERCIAL

## 2023-09-12 PROBLEM — E11.9 TYPE 2 DIABETES MELLITUS WITHOUT COMPLICATION, WITHOUT LONG-TERM CURRENT USE OF INSULIN (H): Status: ACTIVE | Noted: 2023-09-12

## 2023-09-13 ENCOUNTER — TELEPHONE (OUTPATIENT)
Dept: CARDIOLOGY | Facility: CLINIC | Age: 68
End: 2023-09-13

## 2023-09-13 NOTE — TELEPHONE ENCOUNTER
[facility] Call Center    Phone Message    May a detailed message be left on voicemail: yes     Reason for Call: Other: Pt calling to schedule an appointment per verbal recommendation from Maria Del Rosario Darling at [facility] to see Dr. Willett or Dr. Barajas for shortness of breath. Upon looking at pt's recent appointment I saw diastolic heart failure and patient mentioned a diagnosis or question about pulmonary hypertension being a concern after recent echo in August. Please review patient's chart and assist in scheduling with best fitting cardiologist for patients needs. Return call at earliest convenience to assist.      Action Taken: Other: Cardiology    Travel Screening: Not Applicable    Thank you!  [facility]

## 2023-09-14 ENCOUNTER — LAB (OUTPATIENT)
Dept: LAB | Facility: CLINIC | Age: 68
End: 2023-09-14
Payer: COMMERCIAL

## 2023-09-14 DIAGNOSIS — K76.0 FATTY (CHANGE OF) LIVER, NOT ELSEWHERE CLASSIFIED: ICD-10-CM

## 2023-09-14 DIAGNOSIS — I27.29 OTHER SECONDARY PULMONARY HYPERTENSION (H): ICD-10-CM

## 2023-09-14 DIAGNOSIS — D75.1 SECONDARY POLYCYTHEMIA: Primary | ICD-10-CM

## 2023-09-14 DIAGNOSIS — J84.9 ILD (INTERSTITIAL LUNG DISEASE) (H): ICD-10-CM

## 2023-09-14 LAB
ALBUMIN SERPL BCG-MCNC: 4.3 G/DL (ref 3.5–5.2)
ALP SERPL-CCNC: 114 U/L (ref 35–104)
ALT SERPL W P-5'-P-CCNC: 44 U/L (ref 0–50)
AST SERPL W P-5'-P-CCNC: 45 U/L (ref 0–45)
BILIRUB DIRECT SERPL-MCNC: <0.2 MG/DL (ref 0–0.3)
BILIRUB SERPL-MCNC: 0.5 MG/DL
BUN SERPL-MCNC: 15.5 MG/DL (ref 8–23)
CREAT SERPL-MCNC: 0.72 MG/DL (ref 0.51–0.95)
EGFRCR SERPLBLD CKD-EPI 2021: >90 ML/MIN/1.73M2
FASTING STATUS PATIENT QL REPORTED: YES
GLUCOSE SERPL-MCNC: 140 MG/DL (ref 70–99)
HGB BLD-MCNC: 13.4 G/DL (ref 11.7–15.7)
PROT SERPL-MCNC: 7.3 G/DL (ref 6.4–8.3)

## 2023-09-14 PROCEDURE — 80076 HEPATIC FUNCTION PANEL: CPT

## 2023-09-14 PROCEDURE — 85018 HEMOGLOBIN: CPT

## 2023-09-14 PROCEDURE — 86038 ANTINUCLEAR ANTIBODIES: CPT

## 2023-09-14 PROCEDURE — 36415 COLL VENOUS BLD VENIPUNCTURE: CPT

## 2023-09-14 PROCEDURE — 82565 ASSAY OF CREATININE: CPT

## 2023-09-14 PROCEDURE — 84520 ASSAY OF UREA NITROGEN: CPT

## 2023-09-14 PROCEDURE — 82947 ASSAY GLUCOSE BLOOD QUANT: CPT

## 2023-09-18 LAB
ANA PAT SER IF-IMP: ABNORMAL
ANA SER QL IF: POSITIVE
ANA TITR SER IF: ABNORMAL {TITER}

## 2023-10-03 ENCOUNTER — OFFICE VISIT (OUTPATIENT)
Dept: CARDIOLOGY | Facility: CLINIC | Age: 68
End: 2023-10-03
Payer: COMMERCIAL

## 2023-10-03 VITALS
HEART RATE: 96 BPM | SYSTOLIC BLOOD PRESSURE: 129 MMHG | OXYGEN SATURATION: 95 % | BODY MASS INDEX: 45.58 KG/M2 | WEIGHT: 291 LBS | DIASTOLIC BLOOD PRESSURE: 86 MMHG

## 2023-10-03 DIAGNOSIS — I27.81 CHRONIC COR PULMONALE (H): ICD-10-CM

## 2023-10-03 DIAGNOSIS — E66.01 MORBID OBESITY (H): ICD-10-CM

## 2023-10-03 DIAGNOSIS — J84.112 IPF (IDIOPATHIC PULMONARY FIBROSIS) (H): ICD-10-CM

## 2023-10-03 DIAGNOSIS — Z13.6 SCREENING FOR CARDIOVASCULAR CONDITION: Primary | ICD-10-CM

## 2023-10-03 DIAGNOSIS — I10 BENIGN ESSENTIAL HYPERTENSION: ICD-10-CM

## 2023-10-03 DIAGNOSIS — I27.20 PULMONARY HYPERTENSION (H): ICD-10-CM

## 2023-10-03 PROCEDURE — 93000 ELECTROCARDIOGRAM COMPLETE: CPT | Performed by: INTERNAL MEDICINE

## 2023-10-03 PROCEDURE — 99204 OFFICE O/P NEW MOD 45 MIN: CPT | Performed by: INTERNAL MEDICINE

## 2023-10-03 RX ORDER — PIRFENIDONE 267 MG/1
CAPSULE ORAL
COMMUNITY
Start: 2023-09-14

## 2023-10-03 NOTE — PROGRESS NOTES
HPI and Plan:   I had the pleasure of seeing Peyton Vu in cardiology clinic on request of Dr. Wallace for possible pulmonary hypertension.    Peyton is a pleasant 68-year-old female accompanied by her .  She is in a wheelchair and on oxygen.  She has longstanding history of pulmonary fibrosis causing severe restrictive defect.  She had PFTs done again in September which showed severe decrease in DLCO and a mixed picture with severe restriction.  She has not been on steroids in the past and then was given some specific pulmonary fibrosis medications.  She is under care of Dr. Cevallos.  Her resting oxygen saturation is usually been 80% and improved with oxygen supplementation.  She is not able to ambulate much because of her pulmonary fibrosis.  More recently she had an echocardiogram to assess for right-sided function and pressures.  She has been having increasing leg edema and weight gain.  She is on Lasix which was increased from 40 mg daily to 40 mg twice daily.  She is also on diltiazem.  She has history of hypertension for which she is on irbesartan.    For pulmonary fibrosis she is now on Esbriet.    I reviewed the recent echocardiogram images which were technically challenging.  Ejection motion is globally normal but reasonable motion is difficult to assess.  Right ventricle size and function was difficult to assess.  TR jet was not available for assessing RV pressures.  She was referred by her pulmonologist to see if she would benefit from right heart catheterization and assessing for pulmonary hypertension.    Patient tells me that she is not able to ambulate much.  She is not able to lay down and had difficult time during the echocardiogram.  She has had a CT chest recently which showed pulmonary fibrosis changes as well as dilated pulmonary arteries as well as a lung nodule.  Dilated pulmonary arteries likely from pulmonary hypertension.    EKG revealed sinus rhythm with poor R wave progression  likely from pulmonary disease.  Questionable Q waves in inferior leads noted which is unchanged from previous EKG.    On exam regular rate and rhythm.  Short neck.  JVP is difficult to visualize.  Bilateral lower and mid zone fine crackles.  2+ to 3+ leg edema pitting type.    Impression    Severe pulmonary fibrosis severe restrictive defect causing respiratory failure and hypoxia requiring oxygen  Pulmonary hypertension likely based on dilated pulmonary arteries secondary to pulmonary fibrosis/lung disease  Chronic cor pulmonale with right heart failure  Hypertension    Discussion  At this time patient clearly has right heart failure related to pulmonary fibrosis and most likely underlying pulmonary hypertension.  Unfortunately right heart evaluation on echocardiogram was challenging.  1 option may be to do a cardiac MRI but I am not sure patient will be able to tolerate it and lay down flat in the scanner.  Right heart catheterization is also an option to objectively identify the pulmonary artery pressures but again I am concerned that she may not be able to lay down to get a right heart catheterization study.  Right internal jugular vein approach and propped up or sitting position is potentially an option.  In addition the main issue here is pulmonary fibrosis and respiratory failure related that.  She has previously been at [facility] and they did not think she was a good transplant candidate.  She is looking for other options and solutions.    Given the complexity of situation, I have suggested that she will benefit from a consultation with the pulmonary hypertension expert of [facility], Dr. Zuniga to see if there are any options to diagnose and treat her pulmonary hypertension which may be related to underlying lung disease.  Typically patients with pulmonary hypertension and lung disease do not qualify for specific PAH medications.  In addition, there might be some value to getting his  opinion with [facility] to see if she would be a potential lung transplant candidate.    She is interested in pursuing this consultation I will refer her for the same.  Meanwhile continue diuretic therapy for her right heart failure.  At this time, prognosis remains poor.    I would recommend that she continue to follow with her pulmonologist and [facility] pulmonary hypertension clinic at the [place] location.      Sincerely,    Jameel Willett MD    Today's clinic visit entailed:  Review of external notes as documented elsewhere in note  Review of the result(s) of each unique test - EKG from prior visits, echo, CT Chest  The following tests were independently interpreted by me as noted in my documentation: prior ekg  from 2015    Provider  Link to MDM Help Grid     Today's medical decision making was of moderate complexity.      Orders Placed This Encounter   Procedures    Follow-Up with Cardiology- Pulmonary Hypertension    EKG 12-lead complete w/read - Clinics (performed today)       Orders Placed This Encounter   Medications    pirfenidone (ESBRIET) 267 MG capsule     Sig: First week: one twice daily, one capsule 3 times per day for day 7-14, 2 capsules 3 times per day on day 15-30, then 3 capsules 3 times per day after day 30.       There are no discontinued medications.      Encounter Diagnoses   Name Primary?    Screening for cardiovascular condition Yes    Pulmonary hypertension (H)        CURRENT MEDICATIONS:  Current Outpatient Medications   Medication Sig Dispense Refill    albuterol (PROAIR HFA/PROVENTIL HFA/VENTOLIN HFA) 108 (90 Base) MCG/ACT inhaler Inhale 2 puffs into the lungs every 6 hours      calcium-vitamin D (CALTRATE) 600-400 MG-UNIT per tablet Take 1 tablet by mouth 2 times daily      diltiazem ER COATED BEADS (CARDIZEM CD/CARTIA XT) 300 MG 24 hr capsule Take 1 capsule (300 mg) by mouth daily 90 capsule 3    fluticasone-vilanterol (BREO ELLIPTA) 200-25 MCG/INH inhaler  Inhale 1 puff into the lungs daily      furosemide (LASIX) 40 MG tablet Take 1 tablet (40 mg) by mouth daily (Patient taking differently: Take 40 mg by mouth daily Patient taking 1 tablet twice a day) 90 tablet 3    irbesartan (AVAPRO) 300 MG tablet Take 1 tablet (300 mg) by mouth daily 90 tablet 3    omeprazole (PRILOSEC) 40 MG DR capsule Take 1 capsule (40 mg) by mouth daily      pirfenidone (ESBRIET) 267 MG capsule First week: one twice daily, one capsule 3 times per day for day 7-14, 2 capsules 3 times per day on day 15-30, then 3 capsules 3 times per day after day 30.      potassium chloride ER (K-TAB) 20 MEQ CR tablet Take 1 tablet (20 mEq) by mouth daily (Patient taking differently: Take 20 mEq by mouth daily Patient taking one tablet twice daily) 90 tablet 3    ipratropium - albuterol 0.5 mg/2.5 mg/3 mL (DUONEB) 0.5-2.5 (3) MG/3ML neb solution       nintedanib (OFEV) 100 MG capsule Take 150 mg by mouth 2 times daily      predniSONE (DELTASONE) 10 MG tablet Pt taking 5 tabs every day then 4 tabs starting tomorrow x 1 week.         ALLERGIES     Allergies   Allergen Reactions    No Known Allergies        PAST MEDICAL HISTORY:  Past Medical History:   Diagnosis Date    Hypercholesteraemia     Hypertension     NOVA (obstructive sleep apnea) 6/14/2021       PAST SURGICAL HISTORY:  Past Surgical History:   Procedure Laterality Date    D & C      DILATION AND CURETTAGE, HYSTEROSCOPY DIAGNOSTIC, COMBINED N/A 8/10/2015    Procedure: COMBINED DILATION AND CURETTAGE, HYSTEROSCOPY DIAGNOSTIC;  Surgeon: Scarlett Ridlye MD;  Location:  OR       FAMILY HISTORY:  Family History   Problem Relation Age of Onset    Esophageal Cancer Father     Diabetes Paternal Grandmother         Type II       SOCIAL HISTORY:  Social History     Socioeconomic History    Marital status:      Spouse name: None    Number of children: None    Years of education: None    Highest education level: None   Tobacco Use    Smoking status:  Never    Smokeless tobacco: Never   Vaping Use    Vaping Use: Never used   Substance and Sexual Activity    Alcohol use: Yes     Comment: Occas    Drug use: No    Sexual activity: Yes     Partners: Male       Review of Systems:  Skin:          Eyes:         ENT:         Respiratory:  Positive for shortness of breath;cough;sleep apnea cough: dry, sleep apnea: uses bipap with 5L O2   Cardiovascular:  Negative;chest pain;palpitations;dizziness Positive for;lightheadedness;edema;fatigue    Gastroenterology:        Genitourinary:         Musculoskeletal:  Positive for   left arm aches  Neurologic:         Psychiatric:         Heme/Lymph/Imm:         Endocrine:  Negative        Physical Exam:  Vitals: /86 (BP Location: Left arm, Patient Position: Sitting)   Pulse 96   Wt 132 kg (291 lb)   SpO2 95%   BMI 45.58 kg/m        CC  No referring provider defined for this encounter.

## 2023-10-03 NOTE — LETTER
10/3/2023    Peyton Marin MD  [address] [facility] [address]  [place] [address]    RE: Mary E Weiland       Dear Colleague,     I had the pleasure of seeing Mary E Weiland in the [facility].  HPI and Plan:   I had the pleasure of seeing Peyton Vu in cardiology clinic on request of Dr. Wallace for possible pulmonary hypertension.    Peyton is a pleasant 68-year-old female accompanied by her .  She is in a wheelchair and on oxygen.  She has longstanding history of pulmonary fibrosis causing severe restrictive defect.  She had PFTs done again in September which showed severe decrease in DLCO and a mixed picture with severe restriction.  She has not been on steroids in the past and then was given some specific pulmonary fibrosis medications.  She is under care of Dr. Cevallos.  Her resting oxygen saturation is usually been 80% and improved with oxygen supplementation.  She is not able to ambulate much because of her pulmonary fibrosis.  More recently she had an echocardiogram to assess for right-sided function and pressures.  She has been having increasing leg edema and weight gain.  She is on Lasix which was increased from 40 mg daily to 40 mg twice daily.  She is also on diltiazem.  She has history of hypertension for which she is on irbesartan.    For pulmonary fibrosis she is now on Esbriet.    I reviewed the recent echocardiogram images which were technically challenging.  Ejection motion is globally normal but reasonable motion is difficult to assess.  Right ventricle size and function was difficult to assess.  TR jet was not available for assessing RV pressures.  She was referred by her pulmonologist to see if she would benefit from right heart catheterization and assessing for pulmonary hypertension.    Patient tells me that she is not able to ambulate much.  She is not able to lay down and had difficult time during the echocardiogram.  She has had a CT chest recently which showed pulmonary  fibrosis changes as well as dilated pulmonary arteries as well as a lung nodule.  Dilated pulmonary arteries likely from pulmonary hypertension.    EKG revealed sinus rhythm with poor R wave progression likely from pulmonary disease.  Questionable Q waves in inferior leads noted which is unchanged from previous EKG.    On exam regular rate and rhythm.  Short neck.  JVP is difficult to visualize.  Bilateral lower and mid zone fine crackles.  2+ to 3+ leg edema pitting type.    Impression    Severe pulmonary fibrosis severe restrictive defect causing respiratory failure and hypoxia requiring oxygen  Pulmonary hypertension likely based on dilated pulmonary arteries secondary to pulmonary fibrosis/lung disease  Chronic cor pulmonale with right heart failure  Hypertension    Discussion  At this time patient clearly has right heart failure related to pulmonary fibrosis and most likely underlying pulmonary hypertension.  Unfortunately right heart evaluation on echocardiogram was challenging.  1 option may be to do a cardiac MRI but I am not sure patient will be able to tolerate it and lay down flat in the scanner.  Right heart catheterization is also an option to objectively identify the pulmonary artery pressures but again I am concerned that she may not be able to lay down to get a right heart catheterization study.  Right internal jugular vein approach and propped up or sitting position is potentially an option.  In addition the main issue here is pulmonary fibrosis and respiratory failure related that.  She has previously been at [facility] and they did not think she was a good transplant candidate.  She is looking for other options and solutions.    Given the complexity of situation, I have suggested that she will benefit from a consultation with the pulmonary hypertension expert of [facility], Dr. Zuniga to see if there are any options to diagnose and treat her pulmonary hypertension which may be  related to underlying lung disease.  Typically patients with pulmonary hypertension and lung disease do not qualify for specific PAH medications.  In addition, there might be some value to getting his opinion with [facility] to see if she would be a potential lung transplant candidate.    She is interested in pursuing this consultation I will refer her for the same.  Meanwhile continue diuretic therapy for her right heart failure.  At this time, prognosis remains poor.    I would recommend that she continue to follow with her pulmonologist and [facility].      Sincerely,    Jameel Willett MD    Today's clinic visit entailed:  Review of external notes as documented elsewhere in note  Review of the result(s) of each unique test - EKG from prior visits, echo, CT Chest  The following tests were independently interpreted by me as noted in my documentation: prior ekg  from 2015    Provider  Link to [facility] Help Grid     Today's medical decision making was of moderate complexity.      Orders Placed This Encounter   Procedures    Follow-Up with Cardiology- Pulmonary Hypertension    EKG 12-lead complete w/read - Clinics (performed today)       Orders Placed This Encounter   Medications    pirfenidone (ESBRIET) 267 MG capsule     Sig: First week: one twice daily, one capsule 3 times per day for day 7-14, 2 capsules 3 times per day on day 15-30, then 3 capsules 3 times per day after day 30.       There are no discontinued medications.      Encounter Diagnoses   Name Primary?    Screening for cardiovascular condition Yes    Pulmonary hypertension (H)        CURRENT MEDICATIONS:  Current Outpatient Medications   Medication Sig Dispense Refill    albuterol (PROAIR HFA/PROVENTIL HFA/VENTOLIN HFA) 108 (90 Base) MCG/ACT inhaler Inhale 2 puffs into the lungs every 6 hours      calcium-vitamin D (CALTRATE) 600-400 MG-UNIT per tablet Take 1 tablet by mouth 2 times  daily      diltiazem ER COATED BEADS (CARDIZEM CD/CARTIA XT) 300 MG 24 hr capsule Take 1 capsule (300 mg) by mouth daily 90 capsule 3    fluticasone-vilanterol (BREO ELLIPTA) 200-25 MCG/INH inhaler Inhale 1 puff into the lungs daily      furosemide (LASIX) 40 MG tablet Take 1 tablet (40 mg) by mouth daily (Patient taking differently: Take 40 mg by mouth daily Patient taking 1 tablet twice a day) 90 tablet 3    irbesartan (AVAPRO) 300 MG tablet Take 1 tablet (300 mg) by mouth daily 90 tablet 3    omeprazole (PRILOSEC) 40 MG DR capsule Take 1 capsule (40 mg) by mouth daily      pirfenidone (ESBRIET) 267 MG capsule First week: one twice daily, one capsule 3 times per day for day 7-14, 2 capsules 3 times per day on day 15-30, then 3 capsules 3 times per day after day 30.      potassium chloride ER (K-TAB) 20 MEQ CR tablet Take 1 tablet (20 mEq) by mouth daily (Patient taking differently: Take 20 mEq by mouth daily Patient taking one tablet twice daily) 90 tablet 3    ipratropium - albuterol 0.5 mg/2.5 mg/3 mL (DUONEB) 0.5-2.5 (3) MG/3ML neb solution       nintedanib (OFEV) 100 MG capsule Take 150 mg by mouth 2 times daily      predniSONE (DELTASONE) 10 MG tablet Pt taking 5 tabs every day then 4 tabs starting tomorrow x 1 week.         ALLERGIES     Allergies   Allergen Reactions    No Known Allergies        PAST MEDICAL HISTORY:  Past Medical History:   Diagnosis Date    Hypercholesteraemia     Hypertension     NOVA (obstructive sleep apnea) 6/14/2021       PAST SURGICAL HISTORY:  Past Surgical History:   Procedure Laterality Date    D & C      DILATION AND CURETTAGE, HYSTEROSCOPY DIAGNOSTIC, COMBINED N/A 8/10/2015    Procedure: COMBINED DILATION AND CURETTAGE, HYSTEROSCOPY DIAGNOSTIC;  Surgeon: Scarlett Ridley MD;  Location:  OR       FAMILY HISTORY:  Family History   Problem Relation Age of Onset    Esophageal Cancer Father     Diabetes Paternal Grandmother         Type II       SOCIAL HISTORY:  Social  History     Socioeconomic History    Marital status:      Spouse name: None    Number of children: None    Years of education: None    Highest education level: None   Tobacco Use    Smoking status: Never    Smokeless tobacco: Never   Vaping Use    Vaping Use: Never used   Substance and Sexual Activity    Alcohol use: Yes     Comment: Occas    Drug use: No    Sexual activity: Yes     Partners: Male       Review of Systems:  Skin:          Eyes:         ENT:         Respiratory:  Positive for shortness of breath;cough;sleep apnea cough: dry, sleep apnea: uses bipap with 5L O2   Cardiovascular:  Negative;chest pain;palpitations;dizziness Positive for;lightheadedness;edema;fatigue    Gastroenterology:        Genitourinary:         Musculoskeletal:  Positive for   left arm aches  Neurologic:         Psychiatric:         Heme/Lymph/Imm:         Endocrine:  Negative        Physical Exam:  Vitals: /86 (BP Location: Left arm, Patient Position: Sitting)   Pulse 96   Wt 132 kg (291 lb)   SpO2 95%   BMI 45.58 kg/m      CC  No referring provider defined for this encounter.    Thank you for allowing me to participate in the care of your patient.      Sincerely,     Jameel Willett MD     [facility]

## 2023-10-09 ENCOUNTER — TELEPHONE (OUTPATIENT)
Dept: CARDIOLOGY | Facility: CLINIC | Age: 68
End: 2023-10-09

## 2023-10-09 ENCOUNTER — VIRTUAL VISIT (OUTPATIENT)
Dept: INTERNAL MEDICINE | Facility: CLINIC | Age: 68
End: 2023-10-09
Payer: COMMERCIAL

## 2023-10-09 DIAGNOSIS — R06.02 SOB (SHORTNESS OF BREATH): ICD-10-CM

## 2023-10-09 DIAGNOSIS — E78.5 DYSLIPIDEMIA: Primary | ICD-10-CM

## 2023-10-09 DIAGNOSIS — E61.1 IRON DEFICIENCY: ICD-10-CM

## 2023-10-09 DIAGNOSIS — Z11.59 NEED FOR HEPATITIS B SCREENING TEST: ICD-10-CM

## 2023-10-09 DIAGNOSIS — I27.20 PULMONARY HYPERTENSION (H): ICD-10-CM

## 2023-10-09 DIAGNOSIS — E11.9 TYPE 2 DIABETES MELLITUS WITHOUT COMPLICATION, WITHOUT LONG-TERM CURRENT USE OF INSULIN (H): Primary | ICD-10-CM

## 2023-10-09 PROCEDURE — 99213 OFFICE O/P EST LOW 20 MIN: CPT | Mod: VID | Performed by: INTERNAL MEDICINE

## 2023-10-09 ASSESSMENT — ENCOUNTER SYMPTOMS
MUSCULOSKELETAL NEGATIVE: 1
CARDIOVASCULAR NEGATIVE: 1
NEUROLOGICAL NEGATIVE: 1
FATIGUE: 1
GASTROINTESTINAL NEGATIVE: 1
SHORTNESS OF BREATH: 1

## 2023-10-09 NOTE — TELEPHONE ENCOUNTER
[facility] Call Center    Phone Message    May a detailed message be left on voicemail: yes     Reason for Call: Appointment Intake    Referring Provider Name: Dr. Willett   Diagnosis and/or Symptoms: Pulmonary hypertension     Action Taken: Other: Cardiology     Travel Screening: Not Applicable    Thank you!  [facility]  -------------------------------------------------------     Hortencia Juarez; Ronda Lewis MD; Viky Salmon MD2 hours ago (8:19 AM)     MS  Patient only has a Echo and PFT    Anny can you put orders under Ronda for labs, 6 min walk , V/Q please

## 2023-10-09 NOTE — PROGRESS NOTES
Peyton is a 68 year old who is being evaluated via a billable video visit.      How would you like to obtain your AVS? MyChart  If the video visit is dropped, the invitation should be resent by: Send to e-mail at: [email or web address]  Will anyone else be joining your video visit? No      Assessment & Plan     Type 2 diabetes mellitus without complication, without long-term current use of insulin (H)  At this time, patient's blood sugars have not previously been under good control as her last A1c was noted to be 8.5.  Patient was agreeable to have a repeat hemoglobin A1c collected since her previous lab test was approximately 3 months ago.  A lab order for a follow-up A1c has been placed.  Patient is aware that we will contact her with results once they are available for review.  Further recommendations in regards to ongoing management of her blood sugar will be made at that time.  We did briefly discuss dietary modifications that can help improve her blood sugar control.  Patient had no other questions or concerns at this time.  - Hemoglobin A1c; Future    Ordering of each unique test  22minutes spent by me on the date of the encounter doing chart review, history and exam, documentation and further activities per the note       See Patient Instructions    Dimitri Ludwig MD  [facility]   Peyton is a 68 year old, presenting for the following health issues:  Recheck Medication and Diabetes      Patient is a 68-year-old  female with complicated past medical history who participates in a virtual visit for follow-up on her blood sugar.  Patient had been on chronic prednisone therapy related to some of her underlying issues with pulmonary fibrosis.  She had previously had A1c's that were in the prediabetic range at approximately 6.1.  In July 2023, she did have a follow-up A1c collected that was noted to be 8.5.  She is not currently taking any medication for  management of her blood sugar.  Patient has recently come off of all prednisone use, but she is not entirely certain as to the exact timeframe.  She was recently seen by her pulmonologist in September who did encourage her to have a follow-up visit to discuss her blood sugar issues.  At that time, she did have a fasting blood glucose level of 149.  Patient has no other concerns or issues that she wished to discuss at this time.    History of Present Illness       Reason for visit:  Follow up on blood work, medications    She eats 2-3 servings of fruits and vegetables daily.She consumes 0 sweetened beverage(s) daily.She exercises with enough effort to increase her heart rate 9 or less minutes per day.  She exercises with enough effort to increase her heart rate 3 or less days per week.   She is taking medications regularly.       Diabetes Follow-up    How often are you checking your blood sugar? Not at all  What concerns do you have today about your diabetes? None   Do you have any of these symptoms? (Select all that apply)  No numbness or tingling in feet.  No redness, sores or blisters on feet.  No complaints of excessive thirst.  No reports of blurry vision.  No significant changes to weight.  Have you had a diabetic eye exam in the last 12 months? No        BP Readings from Last 2 Encounters:   10/03/23 129/86   05/26/23 120/62     Hemoglobin A1C (%)   Date Value   07/05/2023 8.5 (H)   11/16/2022 6.1 (H)     LDL Cholesterol Calculated (mg/dL)   Date Value   12/14/2021 117 (H)   09/18/2019 82   09/12/2018 92       How many servings of fruits and vegetables do you eat daily?  0-1  On average, how many sweetened beverages do you drink each day (Examples: soda, juice, sweet tea, etc.  Do NOT count diet or artificially sweetened beverages)?   0  How many days per week do you exercise enough to make your heart beat faster? 3 or less  How many minutes a day do you exercise enough to make your heart beat faster? 9 or  less  How many days per week do you miss taking your medication? 0      Review of Systems   Constitutional:  Positive for fatigue.   HENT: Negative.     Respiratory:  Positive for shortness of breath.    Cardiovascular: Negative.    Gastrointestinal: Negative.    Musculoskeletal: Negative.    Neurological: Negative.           Objective         Vitals:  No vitals were obtained today due to virtual visit.    Physical Exam   GENERAL: Healthy, alert and no distress  EYES: Eyes grossly normal to inspection.  No discharge or erythema, or obvious scleral/conjunctival abnormalities.  RESP: No audible wheeze, cough, or visible cyanosis.  No visible retractions or increased work of breathing.    SKIN: Visible skin clear. No significant rash, abnormal pigmentation or lesions.  NEURO: Cranial nerves grossly intact.  Mentation and speech appropriate for age.  PSYCH: Mentation appears normal, affect normal/bright, judgement and insight intact, normal speech and appearance well-groomed.    Diagnostic testing: Hemoglobin A1c is pending.        Video-Visit Details    Type of service:  Video Visit   Video Start Time:  9:48 AM  Video End Time:10:11 AM    Originating Location (pt. Location): Home  Distant Location (provider location):  On-site  Platform used for Video Visit: Sowmya

## 2023-10-12 ENCOUNTER — PRE VISIT (OUTPATIENT)
Dept: CARDIOLOGY | Facility: CLINIC | Age: 68
End: 2023-10-12

## 2023-10-12 ENCOUNTER — LAB (OUTPATIENT)
Dept: LAB | Facility: CLINIC | Age: 68
End: 2023-10-12
Payer: COMMERCIAL

## 2023-10-12 DIAGNOSIS — E11.9 TYPE 2 DIABETES MELLITUS WITHOUT COMPLICATION, WITHOUT LONG-TERM CURRENT USE OF INSULIN (H): ICD-10-CM

## 2023-10-12 LAB — HBA1C MFR BLD: 6.8 %

## 2023-10-12 PROCEDURE — 36415 COLL VENOUS BLD VENIPUNCTURE: CPT

## 2023-10-12 PROCEDURE — 83036 HEMOGLOBIN GLYCOSYLATED A1C: CPT

## 2023-10-13 NOTE — TELEPHONE ENCOUNTER
RECORDS RECEIVED FROM:    DATE RECEIVED:    GENERAL RECORDS STATUS DETAILS   OFFICE NOTE from cardiologists Internal 10-3-23 [place]   EKG (STRIPS & REPORTS) Internal 10-3-23   ECHOS (IMAGES AND REPORTS) Internal 8-9-23   PULMONARY HYPERTENSION      6 MINUTE WALK TEST N/A    PULMONARY FUNCTION TESTS Received 2-22-23   RIGHT HEART CATH (IMAGES) N/A    SLEEP STUDY / OVERNIGHT OXIMETRY N/A    XR CHEST   (IMAGES AND REPORTS) N/A    CHEST CT  (IMAGES AND REPORTS) Internal 6-27-23   V/Q SCAN (IMAGES) N/A    LIVER US  (IMAGES AND REPORTS) N/A    ANGIOGRAMS (IMAGES) N/A    STRESS TEST   (IMAGES AND REPORTS) N/A

## 2023-10-16 ENCOUNTER — TELEPHONE (OUTPATIENT)
Dept: CARDIOLOGY | Facility: CLINIC | Age: 68
End: 2023-10-16
Payer: COMMERCIAL

## 2023-10-16 NOTE — TELEPHONE ENCOUNTER
Health Call Center    Phone Message    May a detailed message be left on voicemail: yes     Reason for Call: Other: Pt requesting a call back to discuss labs that have been ordered and seeing Dr. Salmon for PH. Pt states she has not had a call back and would like to go through all of these tests and labs that were ordered and what they are for before she schedules anything. Please return call to discuss.       Action Taken: Other: Cardiology    Travel Screening: Not Applicable    Thank you!  [facility]

## 2023-10-23 ENCOUNTER — TRANSFERRED RECORDS (OUTPATIENT)
Dept: HEALTH INFORMATION MANAGEMENT | Facility: CLINIC | Age: 68
End: 2023-10-23
Payer: COMMERCIAL

## 2023-10-24 ENCOUNTER — TRANSFERRED RECORDS (OUTPATIENT)
Dept: HEALTH INFORMATION MANAGEMENT | Facility: CLINIC | Age: 68
End: 2023-10-24

## 2023-11-03 ENCOUNTER — LAB (OUTPATIENT)
Dept: LAB | Facility: CLINIC | Age: 68
End: 2023-11-03
Payer: COMMERCIAL

## 2023-11-03 DIAGNOSIS — J84.9 INTERSTITIAL PULMONARY DISEASE (H): Primary | ICD-10-CM

## 2023-11-03 LAB
BUN SERPL-MCNC: 13.2 MG/DL (ref 8–23)
CREAT SERPL-MCNC: 0.64 MG/DL (ref 0.51–0.95)
EGFRCR SERPLBLD CKD-EPI 2021: >90 ML/MIN/1.73M2

## 2023-11-03 PROCEDURE — 82565 ASSAY OF CREATININE: CPT

## 2023-11-03 PROCEDURE — 36415 COLL VENOUS BLD VENIPUNCTURE: CPT

## 2023-11-03 PROCEDURE — 84520 ASSAY OF UREA NITROGEN: CPT

## 2023-12-04 ENCOUNTER — REFERRAL (OUTPATIENT)
Dept: TRANSPLANT | Facility: CLINIC | Age: 68
End: 2023-12-04
Payer: COMMERCIAL

## 2023-12-04 ENCOUNTER — VIRTUAL VISIT (OUTPATIENT)
Dept: CARDIOLOGY | Facility: CLINIC | Age: 68
End: 2023-12-04
Attending: INTERNAL MEDICINE
Payer: COMMERCIAL

## 2023-12-04 VITALS — HEIGHT: 67 IN | BODY MASS INDEX: 45.99 KG/M2 | WEIGHT: 293 LBS

## 2023-12-04 DIAGNOSIS — J84.112 IPF (IDIOPATHIC PULMONARY FIBROSIS) (H): Primary | ICD-10-CM

## 2023-12-04 DIAGNOSIS — I27.20 PULMONARY HYPERTENSION (H): ICD-10-CM

## 2023-12-04 PROCEDURE — 99215 OFFICE O/P EST HI 40 MIN: CPT | Mod: VID | Performed by: INTERNAL MEDICINE

## 2023-12-04 RX ORDER — LIDOCAINE 40 MG/G
CREAM TOPICAL
OUTPATIENT
Start: 2023-12-04

## 2023-12-04 ASSESSMENT — PAIN SCALES - GENERAL: PAINLEVEL: NO PAIN (0)

## 2023-12-04 NOTE — LETTER
Mary E Weiland  [address] [place] [id number]    December 6, 2023    Dear Peyton,    Thank you for your interest in the Transplant Center at [facility]. We look forward to being a part of your care team and assisting you through the transplant process.    As we discussed, your transplant coordinator is Susu Charles (Lung).  You may call your coordinator at any time with questions or concerns.  Your first scheduled call will be on December 7, 2023 between 10:00 am and 12:00 pm.  If this needs to change, call [phone].    Please complete the following.    Fill out and return the enclosed forms  Authorization for Electronic Communication  Authorization to Discuss Protected Health Information  Authorization for Release of Protected Health Information    Sign up for:  [employer], access to your electronic medical record (see enclosed pamphlet)  [employer], a transplant education website       My Transplant Place   You can use these tools to learn more about your transplant, communicate with your care team, and track your medical details  Sincerely,  Solid Organ Transplant  [facility]    cc: Care Team

## 2023-12-04 NOTE — LETTER
Mary E Weiland  [address] [place] [id number]                December 6, 2023      MEDICAL RECORDS REQUEST  [facility] lung transplant team is requesting records from Providers Office for patients referred to the Lung Transplant Program                      Facility: [facility]    Records Needed to Process Intake of Patient:         *     Rheumatology Provider Notes (3 most recent on file)      *     Radiology Reports (the last 2 yrs on file)                  *      Lab Results  (most recent on file)       *      Any Pathology Reports available        Please fax all paper records to [phone] within 1-3 business days.         [address] [place] [id number]    Please call our office at [phone] if you have any questions or concerns.

## 2023-12-04 NOTE — LETTER
2023      RE: Mary E Weiland  [address] [facility] [id number]       Dear Colleague,    Thank you for the opportunity to participate in the care of your patient, Mary E Weiland, at the [facility] HEART [facility]. Please see a copy of my visit note below.    Virtual Visit Details    Type of service:  Video Visit   Video Start Time:  1.30 pm  Video End Time: 2.30 pm    Originating Location (pt. Location): Home    Distant Location (provider location):  On-site  Platform used for Video Visit: [employer]    Service Date: 2023    RE:  Mary E Weiland   MRN:  [id number]  :  1955      Dear Dr. Willett:    We had the pleasure of seeing Mary E Weiland at the [facility] Pulmonary Hypertension Clinic. Although you are familiar with this patient's history, please allow me to summarize it for the purpose of our records.  This was a video visit as patient could not come for an inpatient visit.    She is a 68-year-old female who carries a diagnosis of hypersensitivity fibrotic interstitial lung disease.  She was apparently very healthy up until 2021 when she developed exertional shortness of breath.  On further evaluation she was diagnosed with hypersensitivity pneumonitis induced fibrotic ILD.  She was initially on the need to neb but currently on aspirin.  She follows with [place] [facility].  She has seen pulmonologist at [facility] for a second opinion in the past.  She was considered not a candidate for transplant due to her weight.  She has been on supplemental oxygen since May 2021.    She has been having progressively worsening exertional shortness of breath in the last 2 years.  She is currently not able to walk more than 10 feet.  She is requiring 5 L of oxygen at rest but 10 L with activity.  Her quality of life is extremely poor because of this.  She is currently functional  class IIIb.  She has also been having worsening lower extremity edema requiring higher dose of diuretics.  She is currently on furosemide 40 mg twice a day.  She denies having any exertional chest pain or chest pressure.  No exertional syncope but has had presyncopal episodes.  No hospitalizations or ER visits.    PAST MEDICAL HISTORY:  1.  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  Positive VERENICE with prior negative rheumatological workup   3.  Borderline diabetes mellitus secondary to prednisone  4.  Hypertension  5.  Hyperlipidemia  6.  Obstructive sleep apnea    PAST SURGICAL HISTORY:  Past Surgical History:   Procedure Laterality Date    D & C      DILATION AND CURETTAGE, HYSTEROSCOPY DIAGNOSTIC, COMBINED N/A 8/10/2015       CURRENT MEDICATIONS:  Current Outpatient Medications   Medication Sig    albuterol (PROAIR HFA/PROVENTIL HFA/VENTOLIN HFA) 108 (90 Base) MCG/ACT inhaler Inhale 2 puffs into the lungs every 6 hours    calcium-vitamin D (CALTRATE) 600-400 MG-UNIT per tablet Take 1 tablet by mouth 2 times daily    diltiazem ER COATED BEADS (CARDIZEM CD/CARTIA XT) 300 MG 24 hr capsule Take 1 capsule (300 mg) by mouth daily    fluticasone-vilanterol (BREO ELLIPTA) 200-25 MCG/INH inhaler Inhale 1 puff into the lungs daily    furosemide (LASIX) 40 MG tablet Take 1 tablet (40 mg) by mouth daily (Patient taking differently: Take 40 mg by mouth 2 times daily Patient taking 1 tablet twice a day)    irbesartan (AVAPRO) 300 MG tablet Take 1 tablet (300 mg) by mouth daily    omeprazole (PRILOSEC) 40 MG DR capsule Take 1 capsule (40 mg) by mouth daily    pirfenidone (ESBRIET) 267 MG capsule First week: one twice daily, one capsule 3 times per day for day 7-14, 2 capsules 3 times per day on day 15-30, then 3 capsules 3 times per day after day 30.    potassium chloride ER (K-TAB) 20 MEQ CR tablet Take 1 tablet (20 mEq) by mouth daily (Patient taking differently: Take 20 mEq by mouth 2 times  "daily Patient taking one tablet twice daily)     No current facility-administered medications for this visit.       ROS:   10 point ROS negative except as discussed in above HPI.    SOCIAL HISTORY:  She is currently retired.  She worked as a physical therapist.  She is  and living with her .  She does not smoke.  She drinks alcohol socially 4 times a week.  She has 3 grown kids who are healthy.    FAMILY HISTORY:  Family History   Problem Relation Age of Onset    Esophageal Cancer Father     Diabetes Paternal Grandmother         Type II       EXAM:  Ht 1.702 m (5' 7\")   Wt 133.8 kg (295 lb)   BMI 46.20 kg/m    Awake, alert, and oriented x 3.   Comfortable. No apparent distress.  No significant respiratory distress.  Detailed exam could not be done due to the video nature of the visit.    Labs:    EKG (10/2023):  Her last EKG showed sinus rhythm left atrial enlargement and questionable Q waves in inferior leads and poor R wave progression suggestive of pulmonary disease pattern nded her    Echocardiogram (08/2023):  Left ventricular systolic function is normal.  The visual ejection fraction is 60-65%.  Regional wall motion abnormalities cannot be excluded due to limited  visualization.  RV systolic function probably normal although accuracy limited in absence of  IV contrast use.  Consider CMR for evaluation of cardiac chambers and function if clinically  appropriate. The study was technically difficult. Compared to the prior study  dated 3/21, there have been no changes.    PFT (02/2023)  FVC of 38% predicted FEV1 of 42% predicted, FEV1 by FVC VE CF 77% predicted, DLCO of 24% predicted.    CT Chest (06/2023)  1.  Reticular and groundglass opacities in both lungs are consistent  with fibrosis, and appear similar to the previous exam.  2.  A 1.6 cm right upper lobe pleural nodule is unchanged.  3.  Mediastinal adenopathy is also unchanged.  4.  Dilatation of the central pulmonary arteries again " suggests  pulmonary hypertension.  5.  Hepatic steatosis.    Assessment and Plan:     In summary, Mary E Weiland is a 68-year-old female with past medical history significant for hypersensitivity pneumonitis induced fibrotic ILD who was referred to us for further evaluation and management of pulm hypertension.    Based on her history and evaluation she has had so far she very likely has pulm hypertension secondary to interstitial lung disease.  She is quite symptomatic.  She is functional class III.  Her supplemental oxygen requirements are very high at 10 L with exertion.  Clinically also looks like she has right heart failure.    Will arrange for her to get completed her workup for pulm hypertension including repeat echocardiogram with bubble study, VQ scan ordered dual-energy pulmonary angiogram, repeat pulmonary function test with 6-minute walk test, pulmonary hypertension serological workup, and diagnostic right heart catheterization with acute vasodilator testing.  I discussed the risk and benefits of right heart catheterization and she is willing to undergo this. Will decide on further treatment based on her workup.  I did not change any of her therapy as it is difficult to assess her clinical status through the video visit.     I took the liberty and referred her to our lung transplant team as she is quite symptomatic.      She will return to see us after completing the above workup. It was a pleasure seeing Mary E Weiland at the [facility]monary Hypertension [facility]. Please contact us with any questions or concerns that you may have. We thank you for involving us in this patients care.    Total time today was 65 minutes reviewing notes, imaging, labs, patient visit, orders and documentation     Sincerely,      Viky Salmon MD  Associate Professor of Medicine  [facility] for Pulmonary Hypertension  Heart Failure, Transplant, and Mechanical Circulatory Support Cardiology   Cardiovascular  Division  [facility]   [phone]

## 2023-12-04 NOTE — LETTER
2023      RE: Mary E Weiland  [address] [facility] [address]       Virtual Visit Details    Type of service:  Video Visit   Video Start Time:  1.30 pm  Video End Time: 2.30 pm    Originating Location (pt. Location): Home    Distant Location (provider location):  On-site  Platform used for Video Visit: [employer]    Service Date: 2023    RE:  Mary E Weiland   MRN:  [id number]  :  1955      Dear Dr. Willett:    We had the pleasure of seeing Mary E Weiland at the [facility] Pulmonary Hypertension Clinic. Although you are familiar with this patient's history, please allow me to summarize it for the purpose of our records.  This was a video visit as patient could not come for an inpatient visit.    She is a 68-year-old female who carries a diagnosis of hypersensitivity fibrotic interstitial lung disease.  She was apparently very healthy up until 2021 when she developed exertional shortness of breath.  On further evaluation she was diagnosed with hypersensitivity pneumonitis induced fibrotic ILD.  She was initially on the need to neb but currently on aspirin.  She follows with [place] [facility].  She has seen pulmonologist at [facility] ER for a second opinion in the past.  She was considered not a candidate for transplant due to her weight.  She has been on supplemental oxygen since May 2021.    She has been having progressively worsening exertional shortness of breath in the last 2 years.  She is currently not able to walk more than 10 feet.  She is requiring 5 L of oxygen at rest but 10 L with activity.  Her quality of life is extremely poor because of this.  She is currently functional class IIIb.  She has also been having worsening lower extremity edema requiring higher dose of diuretics.  She is currently on furosemide 40 mg twice a day.  She denies having any exertional chest pain or chest pressure.  No exertional syncope but has had  presyncopal episodes.  No hospitalizations or ER visits.    PAST MEDICAL HISTORY:  1.  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  Positive VERENICE with prior negative rheumatological workup   3.  Borderline diabetes mellitus secondary to prednisone  4.  Hypertension  5.  Hyperlipidemia  6.  Obstructive sleep apnea    PAST SURGICAL HISTORY:  Past Surgical History:   Procedure Laterality Date     D & C       DILATION AND CURETTAGE, HYSTEROSCOPY DIAGNOSTIC, COMBINED N/A 8/10/2015       CURRENT MEDICATIONS:  Current Outpatient Medications   Medication Sig     albuterol (PROAIR HFA/PROVENTIL HFA/VENTOLIN HFA) 108 (90 Base) MCG/ACT inhaler Inhale 2 puffs into the lungs every 6 hours     calcium-vitamin D (CALTRATE) 600-400 MG-UNIT per tablet Take 1 tablet by mouth 2 times daily     diltiazem ER COATED BEADS (CARDIZEM CD/CARTIA XT) 300 MG 24 hr capsule Take 1 capsule (300 mg) by mouth daily     fluticasone-vilanterol (BREO ELLIPTA) 200-25 MCG/INH inhaler Inhale 1 puff into the lungs daily     furosemide (LASIX) 40 MG tablet Take 1 tablet (40 mg) by mouth daily (Patient taking differently: Take 40 mg by mouth 2 times daily Patient taking 1 tablet twice a day)     irbesartan (AVAPRO) 300 MG tablet Take 1 tablet (300 mg) by mouth daily     omeprazole (PRILOSEC) 40 MG DR capsule Take 1 capsule (40 mg) by mouth daily     pirfenidone (ESBRIET) 267 MG capsule First week: one twice daily, one capsule 3 times per day for day 7-14, 2 capsules 3 times per day on day 15-30, then 3 capsules 3 times per day after day 30.     potassium chloride ER (K-TAB) 20 MEQ CR tablet Take 1 tablet (20 mEq) by mouth daily (Patient taking differently: Take 20 mEq by mouth 2 times daily Patient taking one tablet twice daily)     No current facility-administered medications for this visit.       ROS:   10 point ROS negative except as discussed in above HPI.    SOCIAL HISTORY:  She is currently retired.  She worked as a  "physical therapist.  She is  and living with her .  She does not smoke.  She drinks alcohol socially 4 times a week.  She has 3 grown kids who are healthy.    FAMILY HISTORY:  Family History   Problem Relation Age of Onset     Esophageal Cancer Father      Diabetes Paternal Grandmother         Type II       EXAM:  Ht 1.702 m (5' 7\")   Wt 133.8 kg (295 lb)   BMI 46.20 kg/m    Awake, alert, and oriented x 3.   Comfortable. No apparent distress.  No significant respiratory distress.  Detailed exam could not be done due to the video nature of the visit.    Labs:    EKG (10/2023):  Her last EKG showed sinus rhythm left atrial enlargement and questionable Q waves in inferior leads and poor R wave progression suggestive of pulmonary disease pattern nded her    Echocardiogram (08/2023):  Left ventricular systolic function is normal.  The visual ejection fraction is 60-65%.  Regional wall motion abnormalities cannot be excluded due to limited  visualization.  RV systolic function probably normal although accuracy limited in absence of  IV contrast use.  Consider CMR for evaluation of cardiac chambers and function if clinically  appropriate. The study was technically difficult. Compared to the prior study  dated 3/21, there have been no changes.    PFT (02/2023)  FVC of 38% predicted FEV1 of 42% predicted, FEV1 by FVC VE CF 77% predicted, DLCO of 24% predicted.    CT Chest (06/2023)  1.  Reticular and groundglass opacities in both lungs are consistent  with fibrosis, and appear similar to the previous exam.  2.  A 1.6 cm right upper lobe pleural nodule is unchanged.  3.  Mediastinal adenopathy is also unchanged.  4.  Dilatation of the central pulmonary arteries again suggests  pulmonary hypertension.  5.  Hepatic steatosis.    Assessment and Plan:     In summary, Mary E Weiland is a 68-year-old female with past medical history significant for hypersensitivity pneumonitis induced fibrotic ILD who was referred " to us for further evaluation and management of pulm hypertension.    Based on her history and evaluation she has had so far she very likely has pulm hypertension secondary to interstitial lung disease.  She is quite symptomatic.  She is functional class III.  Her supplemental oxygen requirements are very high at 10 L with exertion.  Clinically also looks like she has right heart failure.    Will arrange for her to get completed her workup for pulm hypertension including repeat echocardiogram with bubble study, VQ scan ordered dual-energy pulmonary angiogram, repeat pulmonary function test with 6-minute walk test, pulmonary hypertension serological workup, and diagnostic right heart catheterization with acute vasodilator testing.  I discussed the risk and benefits of right heart catheterization and she is willing to undergo this. Will decide on further treatment based on her workup.  I did not change any of her therapy as it is difficult to assess her clinical status through the video visit.     I took the liberty and referred her to our lung transplant team as she is quite symptomatic.      She will return to see us after completing the above workup. It was a pleasure seeing Mary E Weiland at the [facility] Pulmonary Hypertension Clinic. Please contact us with any questions or concerns that you may have. We thank you for involving us in this patients care.    Total time today was 65 minutes reviewing notes, imaging, labs, patient visit, orders and documentation     Sincerely,      Viky Salmon MD  Associate Professor of Medicine  [facility] Pulmonary Hypertension  Heart Failure, Transplant, and Mechanical Circulatory Support Cardiology   Cardiovascular Division  [facility]   [phone]          Viky Salmon MD

## 2023-12-04 NOTE — PATIENT INSTRUCTIONS
You were seen today in the Pulmonary Hypertension Clinic at the [facility].     Cardiology Provider you saw during your visit:    Dr. Salmon    Medication Changes:  No changes    Recommendations:   Testing    Follow-up:   Schedule for the following: VQ scan with xray directly prior, walk test, right heart catheterization with vaso study with Dr. Salmon in January. WE WILL CALL TO SCHEDULE    Lung transplant referral    Please call us immediately if you have any syncope (fainting or passing out), chest pain, edema (swelling or weight gain), or decline in your functional status (general decline in how you are feeling).    If you have emergent concerns after hours or on the weekend, please call our on-call Cardiologist at [phone], option 4. For emergencies call [phone].     Thank you for allowing us to be a part of your care here at the [facility] Heart Care    If you have questions or concerns please contact us at:    Anny Choi RN (P: [phone])    Nurse Coordinator       Pulmonary Hypertension     [facility]         ALAINA Willoughby   (Prior Authorizations)    ()  Clinic   Clinic   Pulmonary Hypertension   Pulmonary Hypertension  [facility] Heart [facility]  (P)[phone]    (P) [phone]  (F) [phone]

## 2023-12-04 NOTE — NURSING NOTE
Patient confirms medications and allergies are accurate via patients echeck in completion, and or denies any changes since last reviewed/verified.     .Is the patient currently in the state of [place]? YES    Visit mode:VIDEO    If the visit is dropped, the patient can be reconnected by: VIDEO VISIT: Text to cell phone:   Telephone Information:   Mobile [phone]       Will anyone else be joining the visit? NO  (If patient encounters technical issues they should call [phone])    How would you like to obtain your AVS? MyChart    Are changes needed to the allergy or medication list? No    Reason for visit: Consult    Joy GARCIAF

## 2023-12-04 NOTE — LETTER
December 7, 2023    From:  [facility]    To: Ms. Weiland  [address] [address]    Dear Peyton Weiland,    Thank you for considering the [facility] Lung Transplant [facility]. Dr. Viky Salmon referred you to our program for consideration of lung transplant evaluation. We have reviewed your records and, unfortunately, you do not meet our criteria for lung transplant evaluation.  Although your lung disease may be severe enough to consider transplantation as an option, our opinion is that the following condition(s) would put your health and survival at great risk after lung transplantation.     Obesity is an important risk factor for poor outcomes after lung transplantation. At our center our recommended goal BMI (body mass index) for transplantation is 30 or less. Once you reach weight of 224lbs please call us to schedule an appointment. While we may see you and evaluate you as a potential lung transplant candidate in certain cases before reaching the goal, we will not put you on the transplant waiting list unless your BMI is less than 30.  If you need advice or information regarding the best options to help you lose weight, please talk to your Primary Care Provider.    Though you do not meet our program s criteria for lung transplantation, we will be glad to meet with you in our clinic to explore other treatment options for your lung disease.        Thank you for considering the [facility] Lung Transplant Program for your health care needs. Please feel free to contact us at [phone] if you or your physician would like to discuss our decision or with any concerns.     Sincerely,    [facility]    CC Primary Care Provider        Referring Provider

## 2023-12-04 NOTE — PROGRESS NOTES
Virtual Visit Details    Type of service:  Video Visit   Video Start Time:  1.30 pm  Video End Time: 2.30 pm    Originating Location (pt. Location): Home    Distant Location (provider location):  On-site  Platform used for Video Visit: [employer]    Service Date: 2023    RE:  Mary E Weiland   MRN:  [id number]  :  1955      Dear Dr. Willett:    We had the pleasure of seeing Mary E Weiland at the [facility]. Although you are familiar with this patient's history, please allow me to summarize it for the purpose of our records.  This was a video visit as patient could not come for an inpatient visit.    She is a 68-year-old female who carries a diagnosis of hypersensitivity fibrotic interstitial lung disease.  She was apparently very healthy up until 2021 when she developed exertional shortness of breath.  On further evaluation she was diagnosed with hypersensitivity pneumonitis induced fibrotic ILD.  She was initially on the need to neb but currently on aspirin.  She follows with [facility].  She has seen pulmonologist at [facility] for a second opinion in the past.  She was considered not a candidate for transplant due to her weight.  She has been on supplemental oxygen since May 2021.    She has been having progressively worsening exertional shortness of breath in the last 2 years.  She is currently not able to walk more than 10 feet.  She is requiring 5 L of oxygen at rest but 10 L with activity.  Her quality of life is extremely poor because of this.  She is currently functional class IIIb.  She has also been having worsening lower extremity edema requiring higher dose of diuretics.  She is currently on furosemide 40 mg twice a day.  She denies having any exertional chest pain or chest pressure.  No exertional syncope but has had presyncopal episodes.  No hospitalizations or ER visits.    PAST MEDICAL HISTORY:  1.   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  Positive VERENICE with prior negative rheumatological workup   3.  Borderline diabetes mellitus secondary to prednisone  4.  Hypertension  5.  Hyperlipidemia  6.  Obstructive sleep apnea    PAST SURGICAL HISTORY:  Past Surgical History:   Procedure Laterality Date    D & C      DILATION AND CURETTAGE, HYSTEROSCOPY DIAGNOSTIC, COMBINED N/A 8/10/2015       CURRENT MEDICATIONS:  Current Outpatient Medications   Medication Sig    albuterol (PROAIR HFA/PROVENTIL HFA/VENTOLIN HFA) 108 (90 Base) MCG/ACT inhaler Inhale 2 puffs into the lungs every 6 hours    calcium-vitamin D (CALTRATE) 600-400 MG-UNIT per tablet Take 1 tablet by mouth 2 times daily    diltiazem ER COATED BEADS (CARDIZEM CD/CARTIA XT) 300 MG 24 hr capsule Take 1 capsule (300 mg) by mouth daily    fluticasone-vilanterol (BREO ELLIPTA) 200-25 MCG/INH inhaler Inhale 1 puff into the lungs daily    furosemide (LASIX) 40 MG tablet Take 1 tablet (40 mg) by mouth daily (Patient taking differently: Take 40 mg by mouth 2 times daily Patient taking 1 tablet twice a day)    irbesartan (AVAPRO) 300 MG tablet Take 1 tablet (300 mg) by mouth daily    omeprazole (PRILOSEC) 40 MG DR capsule Take 1 capsule (40 mg) by mouth daily    pirfenidone (ESBRIET) 267 MG capsule First week: one twice daily, one capsule 3 times per day for day 7-14, 2 capsules 3 times per day on day 15-30, then 3 capsules 3 times per day after day 30.    potassium chloride ER (K-TAB) 20 MEQ CR tablet Take 1 tablet (20 mEq) by mouth daily (Patient taking differently: Take 20 mEq by mouth 2 times daily Patient taking one tablet twice daily)     No current facility-administered medications for this visit.       ROS:   10 point ROS negative except as discussed in above HPI.    SOCIAL HISTORY:  She is currently retired.  She worked as a physical therapist.  She is  and living with her .  She does not smoke.  She drinks  "alcohol socially 4 times a week.  She has 3 grown kids who are healthy.    FAMILY HISTORY:  Family History   Problem Relation Age of Onset    Esophageal Cancer Father     Diabetes Paternal Grandmother         Type II       EXAM:  Ht 1.702 m (5' 7\")   Wt 133.8 kg (295 lb)   BMI 46.20 kg/m    Awake, alert, and oriented x 3.   Comfortable. No apparent distress.  No significant respiratory distress.  Detailed exam could not be done due to the video nature of the visit.    Labs:    EKG (10/2023):  Her last EKG showed sinus rhythm left atrial enlargement and questionable Q waves in inferior leads and poor R wave progression suggestive of pulmonary disease pattern nded her    Echocardiogram (08/2023):  Left ventricular systolic function is normal.  The visual ejection fraction is 60-65%.  Regional wall motion abnormalities cannot be excluded due to limited  visualization.  RV systolic function probably normal although accuracy limited in absence of  IV contrast use.  Consider CMR for evaluation of cardiac chambers and function if clinically  appropriate. The study was technically difficult. Compared to the prior study  dated 3/21, there have been no changes.    PFT (02/2023)  FVC of 38% predicted FEV1 of 42% predicted, FEV1 by FVC VE CF 77% predicted, DLCO of 24% predicted.    CT Chest (06/2023)  1.  Reticular and groundglass opacities in both lungs are consistent  with fibrosis, and appear similar to the previous exam.  2.  A 1.6 cm right upper lobe pleural nodule is unchanged.  3.  Mediastinal adenopathy is also unchanged.  4.  Dilatation of the central pulmonary arteries again suggests  pulmonary hypertension.  5.  Hepatic steatosis.    Assessment and Plan:     In summary, Mary E Weiland is a 68-year-old female with past medical history significant for hypersensitivity pneumonitis induced fibrotic ILD who was referred to us for further evaluation and management of pulm hypertension.    Based on her history and " evaluation she has had so far she very likely has pulm hypertension secondary to interstitial lung disease.  She is quite symptomatic.  She is functional class III.  Her supplemental oxygen requirements are very high at 10 L with exertion.  Clinically also looks like she has right heart failure.    Will arrange for her to get completed her workup for pulm hypertension including repeat echocardiogram with bubble study, VQ scan ordered dual-energy pulmonary angiogram, repeat pulmonary function test with 6-minute walk test, pulmonary hypertension serological workup, and diagnostic right heart catheterization with acute vasodilator testing.  I discussed the risk and benefits of right heart catheterization and she is willing to undergo this. Will decide on further treatment based on her workup.  I did not change any of her therapy as it is difficult to assess her clinical status through the video visit.     I took the liberty and referred her to our lung transplant team as she is quite symptomatic.      She will return to see us after completing the above workup. It was a pleasure seeing Mary E Weiland at the [facility]. Please contact us with any questions or concerns that you may have. We thank you for involving us in this patients care.    Total time today was 65 minutes reviewing notes, imaging, labs, patient visit, orders and documentation     Sincerely,      Viky Salmon MD  Associate Professor of Medicine  [facility]  Heart Failure, Transplant, and Mechanical Circulatory Support Cardiology   Cardiovascular Division  [facility]   [phone]

## 2023-12-05 ENCOUNTER — TELEPHONE (OUTPATIENT)
Dept: CARDIOLOGY | Facility: CLINIC | Age: 68
End: 2023-12-05
Payer: COMMERCIAL

## 2023-12-06 ENCOUNTER — TELEPHONE (OUTPATIENT)
Dept: PHARMACY | Facility: OTHER | Age: 68
End: 2023-12-06
Payer: COMMERCIAL

## 2023-12-06 VITALS — WEIGHT: 293 LBS | HEIGHT: 67 IN | BODY MASS INDEX: 45.99 KG/M2

## 2023-12-06 NOTE — TELEPHONE ENCOUNTER
MTM Recruitment: [employer]     Referral outreach attempt #1 on December 6, 2023      Outcome: patient opted out    Enrique LopezD  Medication Therapy Management Pharmacist   [facility]

## 2023-12-06 NOTE — TELEPHONE ENCOUNTER
SOT LUNG INTAKE    Intake completed with patient  December 6, 2023    [facility]    Referring Provider: Dr. Salmon  Primary Care Provider: Dr. Dimitri Ludwig  Specialist: Pulmonologist: Maria Del Rosario Darling MD  Rheumatology: Yue Rowan MD  Source/Facility:[facility]  Diagnosis: IPF,PH      Smoking/nicotine use history: none  Alcohol use history: on occasion  Drug use history: nond  Cancer history: none  Cardiac history: PH  BMI: 46  Oxygen use at rest: 5 LPM with activity: 10 LPM      Comments    Is patient in a group home/assisted living? no  Does patient have a guardian? no    Referral intake process completed.  Patient is aware that after financial approval is received, medical records will be requested.   Patient confirmed for a callback from transplant coordinator on December 7, 2023. (within 2 weeks)      Patient verbal consent given to access medical records and documents outside of [facility] through [facility]. yes    Confirmed coordinator will discuss evaluation process in more detail at the time of their call.   Patient is aware of the need to arrange age appropriate cancer screening, vaccinations, and dental care.  Reminded patient to complete questionnaire, complete medical records release, and review packet prior to evaluation visit .  Assessed patient for special needs (ie--wheelchair, assistance, guardian, and ):  wheelchair due to SOB   Patient instructed to call [phone] with questions.

## 2023-12-07 NOTE — TELEPHONE ENCOUNTER
Contacted patient to complete transplant coordinator intake, provide patient with basic information regarding lung transplant process and answer any questions.    Patient noted she was not interested in moving forward with transplant at this time, wanted to complete follow-up testing for pulmonary hypertension first.  But also noted she did not feel like transplant was a good choice for her.    Answered basic questions about transplant for patient.  Noted patient's current BMI of 46 was outside of transplant guidelines.  Offered to send a referral to medical weight loss management.  Patient declined referral stating she would follow-up with her primary care provider to assist with weight loss.    Plan of care made with patient to close lung transplant referral.  Patient aware if she were to change her mind and want more information about lung transplant lung transplant process and or a referral to medical weight loss management through [employer] [place] [place] she could call transplant coordinator at [phone].  Patient reported understanding.    Transplant referral closure letter to be sent to patient, referring provider, and care team.

## 2023-12-12 ENCOUNTER — TELEPHONE (OUTPATIENT)
Dept: INTERNAL MEDICINE | Facility: CLINIC | Age: 68
End: 2023-12-12
Payer: COMMERCIAL

## 2023-12-12 NOTE — TELEPHONE ENCOUNTER
Patient Quality Outreach    Patient is due for the following:   Diabetes -  LDL (Fasting), Eye Exam, Microalbumin, Diabetic Follow-Up Visit, and Foot Exam      Topic Date Due    Zoster (Shingles) Vaccine (1 of 2) Never done    COVID-19 Vaccine (7 - 2023-24 season) 12/07/2023       Next Steps:   Patient has upcoming appointment, these items will be addressed at that time.    Type of outreach:    none      Questions for provider review:    None           January Vela MA  Chart routed to none.

## 2024-01-02 DIAGNOSIS — I10 BENIGN ESSENTIAL HYPERTENSION: ICD-10-CM

## 2024-01-02 RX ORDER — IRBESARTAN 300 MG/1
300 TABLET ORAL DAILY
Qty: 90 TABLET | Refills: 0 | Status: SHIPPED | OUTPATIENT
Start: 2024-01-02 | End: 2024-04-16

## 2024-01-03 ENCOUNTER — HOSPITAL ENCOUNTER (INPATIENT)
Facility: CLINIC | Age: 69
LOS: 6 days | Discharge: HOME | DRG: 189 | End: 2024-01-09
Attending: STUDENT IN AN ORGANIZED HEALTH CARE EDUCATION/TRAINING PROGRAM | Admitting: INTERNAL MEDICINE
Payer: COMMERCIAL

## 2024-01-03 ENCOUNTER — APPOINTMENT (OUTPATIENT)
Dept: CT IMAGING | Facility: CLINIC | Age: 69
End: 2024-01-03
Attending: EMERGENCY MEDICINE
Payer: COMMERCIAL

## 2024-01-03 ENCOUNTER — NURSE TRIAGE (OUTPATIENT)
Dept: INTERNAL MEDICINE | Facility: CLINIC | Age: 69
End: 2024-01-03

## 2024-01-03 ENCOUNTER — APPOINTMENT (OUTPATIENT)
Dept: GENERAL RADIOLOGY | Facility: CLINIC | Age: 69
End: 2024-01-03
Attending: EMERGENCY MEDICINE
Payer: COMMERCIAL

## 2024-01-03 ENCOUNTER — HOSPITAL ENCOUNTER (EMERGENCY)
Facility: CLINIC | Age: 69
Discharge: OTHER INSTITUTION | End: 2024-01-03
Attending: EMERGENCY MEDICINE | Admitting: EMERGENCY MEDICINE
Payer: COMMERCIAL

## 2024-01-03 VITALS
DIASTOLIC BLOOD PRESSURE: 90 MMHG | HEART RATE: 103 BPM | OXYGEN SATURATION: 96 % | SYSTOLIC BLOOD PRESSURE: 139 MMHG | RESPIRATION RATE: 14 BRPM

## 2024-01-03 DIAGNOSIS — J20.5 ACUTE BRONCHITIS DUE TO RESPIRATORY SYNCYTIAL VIRUS (RSV): ICD-10-CM

## 2024-01-03 DIAGNOSIS — J96.20 ACUTE ON CHRONIC RESPIRATORY FAILURE, UNSPECIFIED WHETHER WITH HYPOXIA OR HYPERCAPNIA (H): ICD-10-CM

## 2024-01-03 DIAGNOSIS — J84.10 PULMONARY FIBROSIS (H): ICD-10-CM

## 2024-01-03 DIAGNOSIS — J84.112 IPF (IDIOPATHIC PULMONARY FIBROSIS) (H): ICD-10-CM

## 2024-01-03 DIAGNOSIS — I27.20 PULMONARY HYPERTENSION (H): ICD-10-CM

## 2024-01-03 DIAGNOSIS — E11.9 TYPE 2 DIABETES MELLITUS WITHOUT COMPLICATION, WITHOUT LONG-TERM CURRENT USE OF INSULIN (H): Primary | ICD-10-CM

## 2024-01-03 DIAGNOSIS — J96.21 ACUTE ON CHRONIC RESPIRATORY FAILURE WITH HYPOXEMIA (H): Primary | ICD-10-CM

## 2024-01-03 DIAGNOSIS — B33.8 RSV INFECTION: ICD-10-CM

## 2024-01-03 LAB
ALBUMIN SERPL BCG-MCNC: 4.5 G/DL (ref 3.5–5.2)
ALP SERPL-CCNC: 122 U/L (ref 40–150)
ALT SERPL W P-5'-P-CCNC: 34 U/L (ref 0–50)
ANION GAP BLD CALCULATED.3IONS-SCNC: 17 MMOL/L (ref 3–14)
ANION GAP SERPL CALCULATED.3IONS-SCNC: 14 MMOL/L (ref 7–15)
AST SERPL W P-5'-P-CCNC: 41 U/L (ref 0–45)
BASOPHILS # BLD AUTO: NORMAL 10*3/UL
BASOPHILS # BLD MANUAL: 0 10E3/UL (ref 0–0.2)
BASOPHILS NFR BLD AUTO: NORMAL %
BASOPHILS NFR BLD MANUAL: 0 %
BILIRUB SERPL-MCNC: 0.5 MG/DL
BUN SERPL-MCNC: 19.4 MG/DL (ref 8–23)
BUN SERPL-MCNC: 21 MG/DL (ref 7–30)
CA-I BLD-MCNC: 4.8 MG/DL (ref 4.4–5.2)
CALCIUM SERPL-MCNC: 9.1 MG/DL (ref 8.8–10.2)
CHLORIDE BLD-SCNC: 99 MMOL/L (ref 94–109)
CHLORIDE SERPL-SCNC: 98 MMOL/L (ref 98–107)
CO2 BLD-SCNC: 28 MMOL/L (ref 20–32)
CREAT BLD-MCNC: 0.7 MG/DL (ref 0.5–1)
CREAT SERPL-MCNC: 0.75 MG/DL (ref 0.51–0.95)
D DIMER PPP FEU-MCNC: 1.29 UG/ML FEU (ref 0–0.5)
DEPRECATED HCO3 PLAS-SCNC: 25 MMOL/L (ref 22–29)
EGFRCR SERPLBLD CKD-EPI 2021: 86 ML/MIN/1.73M2
EOSINOPHIL # BLD AUTO: NORMAL 10*3/UL
EOSINOPHIL # BLD MANUAL: 0 10E3/UL (ref 0–0.7)
EOSINOPHIL NFR BLD AUTO: NORMAL %
EOSINOPHIL NFR BLD MANUAL: 0 %
ERYTHROCYTE [DISTWIDTH] IN BLOOD BY AUTOMATED COUNT: 13 % (ref 10–15)
FLUAV RNA SPEC QL NAA+PROBE: NEGATIVE
FLUBV RNA RESP QL NAA+PROBE: NEGATIVE
GLUCOSE BLD-MCNC: 141 MG/DL (ref 70–99)
GLUCOSE SERPL-MCNC: 142 MG/DL (ref 70–99)
HCO3 BLDV-SCNC: 26 MMOL/L (ref 21–28)
HCO3 BLDV-SCNC: 30 MMOL/L (ref 21–28)
HCT VFR BLD AUTO: 44 % (ref 35–47)
HCT VFR BLD CALC: 45 % (ref 35–47)
HGB BLD-MCNC: 14.8 G/DL (ref 11.7–15.7)
HGB BLD-MCNC: 15.3 G/DL (ref 11.7–15.7)
HOLD SPECIMEN: NORMAL
IMM GRANULOCYTES # BLD: NORMAL 10*3/UL
IMM GRANULOCYTES NFR BLD: NORMAL %
LACTATE BLD-SCNC: 1 MMOL/L
LACTATE BLD-SCNC: 1.4 MMOL/L
LYMPHOCYTES # BLD AUTO: NORMAL 10*3/UL
LYMPHOCYTES # BLD MANUAL: 1.6 10E3/UL (ref 0.8–5.3)
LYMPHOCYTES NFR BLD AUTO: NORMAL %
LYMPHOCYTES NFR BLD MANUAL: 16 %
MAGNESIUM SERPL-MCNC: 2 MG/DL (ref 1.7–2.3)
MCH RBC QN AUTO: 31.5 PG (ref 26.5–33)
MCHC RBC AUTO-ENTMCNC: 33.6 G/DL (ref 31.5–36.5)
MCV RBC AUTO: 94 FL (ref 78–100)
MONOCYTES # BLD AUTO: NORMAL 10*3/UL
MONOCYTES # BLD MANUAL: 0.7 10E3/UL (ref 0–1.3)
MONOCYTES NFR BLD AUTO: NORMAL %
MONOCYTES NFR BLD MANUAL: 7 %
NEUTROPHILS # BLD AUTO: NORMAL 10*3/UL
NEUTROPHILS # BLD MANUAL: 7.8 10E3/UL (ref 1.6–8.3)
NEUTROPHILS NFR BLD AUTO: NORMAL %
NEUTROPHILS NFR BLD MANUAL: 77 %
NRBC # BLD AUTO: 0 10E3/UL
NRBC BLD AUTO-RTO: 0 /100
NT-PROBNP SERPL-MCNC: 222 PG/ML (ref 0–900)
PCO2 BLDV: 47 MM HG (ref 40–50)
PCO2 BLDV: 49 MM HG (ref 40–50)
PH BLDV: 7.34 [PH] (ref 7.32–7.43)
PH BLDV: 7.4 [PH] (ref 7.32–7.43)
PLAT MORPH BLD: NORMAL
PLATELET # BLD AUTO: 165 10E3/UL (ref 150–450)
PO2 BLDV: 25 MM HG (ref 25–47)
PO2 BLDV: 36 MM HG (ref 25–47)
POTASSIUM BLD-SCNC: 4.3 MMOL/L (ref 3.4–5.3)
POTASSIUM SERPL-SCNC: 4.3 MMOL/L (ref 3.4–5.3)
PROT SERPL-MCNC: 8.5 G/DL (ref 6.4–8.3)
RBC # BLD AUTO: 4.7 10E6/UL (ref 3.8–5.2)
RBC MORPH BLD: NORMAL
RSV RNA SPEC NAA+PROBE: POSITIVE
SAO2 % BLDV: 43 % (ref 94–100)
SAO2 % BLDV: 65 % (ref 94–100)
SARS-COV-2 RNA RESP QL NAA+PROBE: NEGATIVE
SODIUM BLD-SCNC: 138 MMOL/L (ref 135–145)
SODIUM SERPL-SCNC: 137 MMOL/L (ref 135–145)
TROPONIN T SERPL HS-MCNC: 17 NG/L
TROPONIN T SERPL HS-MCNC: 20 NG/L
TSH SERPL DL<=0.005 MIU/L-ACNC: 2.99 UIU/ML (ref 0.3–4.2)
WBC # BLD AUTO: 10.1 10E3/UL (ref 4–11)

## 2024-01-03 PROCEDURE — 250N000011 HC RX IP 250 OP 636: Performed by: EMERGENCY MEDICINE

## 2024-01-03 PROCEDURE — 93005 ELECTROCARDIOGRAM TRACING: CPT

## 2024-01-03 PROCEDURE — 80047 BASIC METABLC PNL IONIZED CA: CPT

## 2024-01-03 PROCEDURE — 82805 BLOOD GASES W/O2 SATURATION: CPT | Performed by: INTERNAL MEDICINE

## 2024-01-03 PROCEDURE — 83880 ASSAY OF NATRIURETIC PEPTIDE: CPT | Performed by: EMERGENCY MEDICINE

## 2024-01-03 PROCEDURE — 82803 BLOOD GASES ANY COMBINATION: CPT

## 2024-01-03 PROCEDURE — 71045 X-RAY EXAM CHEST 1 VIEW: CPT

## 2024-01-03 PROCEDURE — 5A09457 ASSISTANCE WITH RESPIRATORY VENTILATION, 24-96 CONSECUTIVE HOURS, CONTINUOUS POSITIVE AIRWAY PRESSURE: ICD-10-PCS | Performed by: INTERNAL MEDICINE

## 2024-01-03 PROCEDURE — 84443 ASSAY THYROID STIM HORMONE: CPT | Performed by: EMERGENCY MEDICINE

## 2024-01-03 PROCEDURE — 99223 1ST HOSP IP/OBS HIGH 75: CPT | Mod: AI | Performed by: INTERNAL MEDICINE

## 2024-01-03 PROCEDURE — 999N000157 HC STATISTIC RCP TIME EA 10 MIN

## 2024-01-03 PROCEDURE — 36415 COLL VENOUS BLD VENIPUNCTURE: CPT | Performed by: INTERNAL MEDICINE

## 2024-01-03 PROCEDURE — 36415 COLL VENOUS BLD VENIPUNCTURE: CPT | Performed by: EMERGENCY MEDICINE

## 2024-01-03 PROCEDURE — 85007 BL SMEAR W/DIFF WBC COUNT: CPT | Performed by: EMERGENCY MEDICINE

## 2024-01-03 PROCEDURE — 87637 SARSCOV2&INF A&B&RSV AMP PRB: CPT | Performed by: EMERGENCY MEDICINE

## 2024-01-03 PROCEDURE — 250N000009 HC RX 250: Performed by: EMERGENCY MEDICINE

## 2024-01-03 PROCEDURE — 82565 ASSAY OF CREATININE: CPT | Performed by: INTERNAL MEDICINE

## 2024-01-03 PROCEDURE — 94640 AIRWAY INHALATION TREATMENT: CPT

## 2024-01-03 PROCEDURE — 99291 CRITICAL CARE FIRST HOUR: CPT

## 2024-01-03 PROCEDURE — 94660 CPAP INITIATION&MGMT: CPT

## 2024-01-03 PROCEDURE — 80053 COMPREHEN METABOLIC PANEL: CPT | Performed by: EMERGENCY MEDICINE

## 2024-01-03 PROCEDURE — 96375 TX/PRO/DX INJ NEW DRUG ADDON: CPT | Mod: 59

## 2024-01-03 PROCEDURE — 85027 COMPLETE CBC AUTOMATED: CPT | Performed by: EMERGENCY MEDICINE

## 2024-01-03 PROCEDURE — 99292 CRITICAL CARE ADDL 30 MIN: CPT

## 2024-01-03 PROCEDURE — 84484 ASSAY OF TROPONIN QUANT: CPT | Performed by: EMERGENCY MEDICINE

## 2024-01-03 PROCEDURE — 83735 ASSAY OF MAGNESIUM: CPT | Performed by: EMERGENCY MEDICINE

## 2024-01-03 PROCEDURE — 120N000013 HC R&B IMCU

## 2024-01-03 PROCEDURE — 71275 CT ANGIOGRAPHY CHEST: CPT

## 2024-01-03 PROCEDURE — 96374 THER/PROPH/DIAG INJ IV PUSH: CPT | Mod: 59

## 2024-01-03 PROCEDURE — 85379 FIBRIN DEGRADATION QUANT: CPT | Performed by: EMERGENCY MEDICINE

## 2024-01-03 RX ORDER — IOPAMIDOL 755 MG/ML
500 INJECTION, SOLUTION INTRAVASCULAR ONCE
Status: COMPLETED | OUTPATIENT
Start: 2024-01-03 | End: 2024-01-03

## 2024-01-03 RX ORDER — HYDROMORPHONE HCL IN WATER/PF 6 MG/30 ML
0.4 PATIENT CONTROLLED ANALGESIA SYRINGE INTRAVENOUS
Status: DISCONTINUED | OUTPATIENT
Start: 2024-01-03 | End: 2024-01-09 | Stop reason: HOSPADM

## 2024-01-03 RX ORDER — ACETAMINOPHEN 325 MG/1
650 TABLET ORAL EVERY 4 HOURS PRN
Status: DISCONTINUED | OUTPATIENT
Start: 2024-01-03 | End: 2024-01-09 | Stop reason: HOSPADM

## 2024-01-03 RX ORDER — HYDROMORPHONE HCL IN WATER/PF 6 MG/30 ML
0.2 PATIENT CONTROLLED ANALGESIA SYRINGE INTRAVENOUS
Status: DISCONTINUED | OUTPATIENT
Start: 2024-01-03 | End: 2024-01-09 | Stop reason: HOSPADM

## 2024-01-03 RX ORDER — ONDANSETRON 2 MG/ML
4 INJECTION INTRAMUSCULAR; INTRAVENOUS EVERY 6 HOURS PRN
Status: DISCONTINUED | OUTPATIENT
Start: 2024-01-03 | End: 2024-01-09 | Stop reason: HOSPADM

## 2024-01-03 RX ORDER — NALOXONE HYDROCHLORIDE 0.4 MG/ML
0.2 INJECTION, SOLUTION INTRAMUSCULAR; INTRAVENOUS; SUBCUTANEOUS
Status: DISCONTINUED | OUTPATIENT
Start: 2024-01-03 | End: 2024-01-09 | Stop reason: HOSPADM

## 2024-01-03 RX ORDER — IPRATROPIUM BROMIDE AND ALBUTEROL SULFATE 2.5; .5 MG/3ML; MG/3ML
3 SOLUTION RESPIRATORY (INHALATION) ONCE
Status: COMPLETED | OUTPATIENT
Start: 2024-01-03 | End: 2024-01-03

## 2024-01-03 RX ORDER — LIDOCAINE 40 MG/G
CREAM TOPICAL
Status: DISCONTINUED | OUTPATIENT
Start: 2024-01-03 | End: 2024-01-09 | Stop reason: HOSPADM

## 2024-01-03 RX ORDER — DEXTROSE MONOHYDRATE 25 G/50ML
25-50 INJECTION, SOLUTION INTRAVENOUS
Status: DISCONTINUED | OUTPATIENT
Start: 2024-01-03 | End: 2024-01-09 | Stop reason: HOSPADM

## 2024-01-03 RX ORDER — NALOXONE HYDROCHLORIDE 0.4 MG/ML
0.4 INJECTION, SOLUTION INTRAMUSCULAR; INTRAVENOUS; SUBCUTANEOUS
Status: DISCONTINUED | OUTPATIENT
Start: 2024-01-03 | End: 2024-01-09 | Stop reason: HOSPADM

## 2024-01-03 RX ORDER — METHYLPREDNISOLONE SODIUM SUCCINATE 125 MG/2ML
125 INJECTION, POWDER, LYOPHILIZED, FOR SOLUTION INTRAMUSCULAR; INTRAVENOUS EVERY 8 HOURS
Status: DISCONTINUED | OUTPATIENT
Start: 2024-01-04 | End: 2024-01-04

## 2024-01-03 RX ORDER — AMOXICILLIN 250 MG
2 CAPSULE ORAL 2 TIMES DAILY PRN
Status: DISCONTINUED | OUTPATIENT
Start: 2024-01-03 | End: 2024-01-09 | Stop reason: HOSPADM

## 2024-01-03 RX ORDER — NALOXONE HYDROCHLORIDE 0.4 MG/ML
0.2 INJECTION, SOLUTION INTRAMUSCULAR; INTRAVENOUS; SUBCUTANEOUS
Status: DISCONTINUED | OUTPATIENT
Start: 2024-01-03 | End: 2024-01-08

## 2024-01-03 RX ORDER — NALOXONE HYDROCHLORIDE 0.4 MG/ML
0.4 INJECTION, SOLUTION INTRAMUSCULAR; INTRAVENOUS; SUBCUTANEOUS
Status: DISCONTINUED | OUTPATIENT
Start: 2024-01-03 | End: 2024-01-08

## 2024-01-03 RX ORDER — ALBUTEROL SULFATE 0.83 MG/ML
2.5 SOLUTION RESPIRATORY (INHALATION)
Status: DISCONTINUED | OUTPATIENT
Start: 2024-01-03 | End: 2024-01-09 | Stop reason: HOSPADM

## 2024-01-03 RX ORDER — AMOXICILLIN 250 MG
1 CAPSULE ORAL 2 TIMES DAILY PRN
Status: DISCONTINUED | OUTPATIENT
Start: 2024-01-03 | End: 2024-01-09 | Stop reason: HOSPADM

## 2024-01-03 RX ORDER — DILTIAZEM HYDROCHLORIDE 300 MG/1
300 CAPSULE, COATED, EXTENDED RELEASE ORAL DAILY
Status: DISCONTINUED | OUTPATIENT
Start: 2024-01-04 | End: 2024-01-09 | Stop reason: HOSPADM

## 2024-01-03 RX ORDER — METHYLPREDNISOLONE SODIUM SUCCINATE 125 MG/2ML
125 INJECTION, POWDER, LYOPHILIZED, FOR SOLUTION INTRAMUSCULAR; INTRAVENOUS ONCE
Status: COMPLETED | OUTPATIENT
Start: 2024-01-03 | End: 2024-01-03

## 2024-01-03 RX ORDER — PROCHLORPERAZINE MALEATE 5 MG
5 TABLET ORAL EVERY 6 HOURS PRN
Status: DISCONTINUED | OUTPATIENT
Start: 2024-01-03 | End: 2024-01-09 | Stop reason: HOSPADM

## 2024-01-03 RX ORDER — IRBESARTAN 150 MG/1
300 TABLET ORAL DAILY
Status: DISCONTINUED | OUTPATIENT
Start: 2024-01-04 | End: 2024-01-09 | Stop reason: HOSPADM

## 2024-01-03 RX ORDER — OXYCODONE HYDROCHLORIDE 5 MG/1
5 TABLET ORAL EVERY 4 HOURS PRN
Status: DISCONTINUED | OUTPATIENT
Start: 2024-01-03 | End: 2024-01-09 | Stop reason: HOSPADM

## 2024-01-03 RX ORDER — FUROSEMIDE 10 MG/ML
40 INJECTION INTRAMUSCULAR; INTRAVENOUS ONCE
Status: COMPLETED | OUTPATIENT
Start: 2024-01-03 | End: 2024-01-03

## 2024-01-03 RX ORDER — FLUTICASONE FUROATE AND VILANTEROL 200; 25 UG/1; UG/1
1 POWDER RESPIRATORY (INHALATION) DAILY
Status: DISCONTINUED | OUTPATIENT
Start: 2024-01-04 | End: 2024-01-09 | Stop reason: HOSPADM

## 2024-01-03 RX ORDER — LORAZEPAM 2 MG/ML
0.5 INJECTION INTRAMUSCULAR ONCE
Status: COMPLETED | OUTPATIENT
Start: 2024-01-03 | End: 2024-01-03

## 2024-01-03 RX ORDER — PROCHLORPERAZINE 25 MG
12.5 SUPPOSITORY, RECTAL RECTAL EVERY 12 HOURS PRN
Status: DISCONTINUED | OUTPATIENT
Start: 2024-01-03 | End: 2024-01-09 | Stop reason: HOSPADM

## 2024-01-03 RX ORDER — LIDOCAINE 40 MG/G
CREAM TOPICAL
Status: DISCONTINUED | OUTPATIENT
Start: 2024-01-03 | End: 2024-01-04

## 2024-01-03 RX ORDER — FUROSEMIDE 10 MG/ML
INJECTION INTRAMUSCULAR; INTRAVENOUS
Status: DISCONTINUED
Start: 2024-01-03 | End: 2024-01-03 | Stop reason: HOSPADM

## 2024-01-03 RX ORDER — POLYETHYLENE GLYCOL 3350 17 G/17G
17 POWDER, FOR SOLUTION ORAL 2 TIMES DAILY PRN
Status: DISCONTINUED | OUTPATIENT
Start: 2024-01-03 | End: 2024-01-09 | Stop reason: HOSPADM

## 2024-01-03 RX ORDER — ONDANSETRON 4 MG/1
4 TABLET, ORALLY DISINTEGRATING ORAL EVERY 6 HOURS PRN
Status: DISCONTINUED | OUTPATIENT
Start: 2024-01-03 | End: 2024-01-09 | Stop reason: HOSPADM

## 2024-01-03 RX ORDER — FUROSEMIDE 10 MG/ML
40 INJECTION INTRAMUSCULAR; INTRAVENOUS
Status: DISCONTINUED | OUTPATIENT
Start: 2024-01-04 | End: 2024-01-04

## 2024-01-03 RX ORDER — ACETAMINOPHEN 650 MG/1
650 SUPPOSITORY RECTAL EVERY 4 HOURS PRN
Status: DISCONTINUED | OUTPATIENT
Start: 2024-01-03 | End: 2024-01-09 | Stop reason: HOSPADM

## 2024-01-03 RX ORDER — SALIVA STIMULANT COMB. NO.3
2 SPRAY, NON-AEROSOL (ML) MUCOUS MEMBRANE
Status: DISCONTINUED | OUTPATIENT
Start: 2024-01-03 | End: 2024-01-09 | Stop reason: HOSPADM

## 2024-01-03 RX ORDER — ENOXAPARIN SODIUM 100 MG/ML
40 INJECTION SUBCUTANEOUS EVERY 12 HOURS
Status: DISCONTINUED | OUTPATIENT
Start: 2024-01-04 | End: 2024-01-09 | Stop reason: HOSPADM

## 2024-01-03 RX ORDER — NICOTINE POLACRILEX 4 MG
15-30 LOZENGE BUCCAL
Status: DISCONTINUED | OUTPATIENT
Start: 2024-01-03 | End: 2024-01-09 | Stop reason: HOSPADM

## 2024-01-03 RX ORDER — CARBOXYMETHYLCELLULOSE SODIUM 5 MG/ML
1 SOLUTION/ DROPS OPHTHALMIC
Status: DISCONTINUED | OUTPATIENT
Start: 2024-01-03 | End: 2024-01-09 | Stop reason: HOSPADM

## 2024-01-03 RX ORDER — IPRATROPIUM BROMIDE AND ALBUTEROL SULFATE 2.5; .5 MG/3ML; MG/3ML
3 SOLUTION RESPIRATORY (INHALATION)
Status: DISCONTINUED | OUTPATIENT
Start: 2024-01-04 | End: 2024-01-09 | Stop reason: HOSPADM

## 2024-01-03 RX ORDER — BISACODYL 10 MG
10 SUPPOSITORY, RECTAL RECTAL DAILY PRN
Status: DISCONTINUED | OUTPATIENT
Start: 2024-01-03 | End: 2024-01-09 | Stop reason: HOSPADM

## 2024-01-03 RX ADMIN — LORAZEPAM 0.5 MG: 2 INJECTION INTRAMUSCULAR; INTRAVENOUS at 15:04

## 2024-01-03 RX ADMIN — FUROSEMIDE 40 MG: 10 INJECTION, SOLUTION INTRAMUSCULAR; INTRAVENOUS at 14:52

## 2024-01-03 RX ADMIN — METHYLPREDNISOLONE SODIUM SUCCINATE 125 MG: 125 INJECTION, POWDER, FOR SOLUTION INTRAMUSCULAR; INTRAVENOUS at 14:53

## 2024-01-03 RX ADMIN — IOPAMIDOL 77 ML: 755 INJECTION, SOLUTION INTRAVENOUS at 17:21

## 2024-01-03 RX ADMIN — IPRATROPIUM BROMIDE AND ALBUTEROL SULFATE 3 ML: .5; 3 SOLUTION RESPIRATORY (INHALATION) at 14:53

## 2024-01-03 ASSESSMENT — ACTIVITIES OF DAILY LIVING (ADL)
ADLS_ACUITY_SCORE: 35

## 2024-01-03 NOTE — TELEPHONE ENCOUNTER
Patient's daughterJennifer calls back (no consent to communicate on file), but patient's spouse (consent to communicate on file) was present during phone call and okayed to speak to daughterJeninfer on speaker phone.    Jennifer states that patient was trying to use the bathroom and had shortness of breath and turned blue. Patient was taken to EMS by ambulance. Patient currently at [facility] emergency room. Patient's daughter wanted to update primary care provider and care team. Jennifer still states that patient still wishes to have hospice care and will look out to receive a call from hospice.    Thank you,  Dwight Phelps, Triage RN [facility]  2:56 PM 1/3/2024

## 2024-01-03 NOTE — ED TRIAGE NOTES
Pt has a history of pulmonary fibrosis, called EMS when she started having shortness of breath.  Per EMS she became more lethargic in the rig and was started on CPAP.  On arrival BiPAP was started. Pt is alert and oriented, tachypnic and states it is difficult to breathe      Triage Assessment (Adult)       Row Name 01/03/24 1456          Triage Assessment    Airway WDL WDL        Respiratory WDL    Respiratory WDL X;rhythm/pattern     Rhythm/Pattern, Respiratory tachypneic;shortness of breath;shallow        Skin Circulation/Temperature WDL    Skin Circulation/Temperature WDL WDL        Cardiac WDL    Cardiac WDL X     Cardiac Rhythm ST        Peripheral/Neurovascular WDL    Peripheral Neurovascular WDL WDL        Cognitive/Neuro/Behavioral WDL    Cognitive/Neuro/Behavioral WDL WDL

## 2024-01-03 NOTE — ED PROVIDER NOTES
History     Chief Complaint:  Shortness of Breath     HPI   Mary E Weiland is a 68 year old female with history of pulmonary fibrosis, hypercholesteremia, hypertension, type II diabetes mellitus, and obstructive sleep apnea who presents to the ED via EMS from home with shortness of breath. EMS reports that the patient's family called 911 today because she started experiencing severe shortness of breath. No O2 saturations were obtained upon EMS arrival, though she became lethargic in the rig and was started on CPAP at this time. EMS notes that the maximum O2 saturation she had on CPAP was 92%. The CPAP offered some relief in breathing and patient notes that she feels better upon arrival to the ED. Patient says she has chronic breathing issues, though her breathing has been worse for the last few days. Her breathing then became acutely worse this afternoon, which resulted in EMS being called. She also endorses a low appetite over the last two days due to increased fatigue. She adds that she has a chronic cough which used to be a dry cough but is now more of a productive cough. She has not coughed up any blood or dark colored phlegm recently. Peyton denies any current chest pain. No recent fevers. Patient also notes that she typically uses her Albuterol inhaler once daily. She is not on chronic steroids, though occasionally is put on a course of steroids for pulmonary fibrosis flare ups. She is also on 20 mg Lasix twice daily for chronic leg swelling. She has not missed any doses of her Lasix recently and has been urinating normally. No known history of CHF or other chronic lung disorders. No sick contacts.     Independent Historian:   EMS - They report additional history as noted above.    Review of External Notes:   Outside clinic notes reviewed. I reviewed past [facility] notes 10/3/23. I reviewed past echocardiogram 8/9/23, results below:  Interpretation Summary     Contrast not used due to no IV access.  Left  ventricular systolic function is normal.  The visual ejection fraction is 60-65%.  Regional wall motion abnormalities cannot be excluded due to limited  visualization.  RV systolic function probably normal although accuracy limited in absence of  IV contrast use.  Consider CMR for evaluation of cardiac chambers and function if clinically  appropriate. The study was technically difficult. Compared to the prior study  dated 3/21, there have been no changes.      Medications:    Pro-air   Caltrate  Cardizem   Breo Ellipta  Lasix  Avapro  Prilosec   Esbriet   K-tab   Deltasone   Bactrim   Zocor   Tenormin     Past Medical History:    Hypercholesteremia  HTN  NOVA   Morbid obesity   Idiopathic pulmonary fibrosis   Type II diabetes mellitus   Deafness of right ear due to rubella   Chronic cor pulmonale   Pulmonary HTN  Non-alcoholic fatty liver   Uterine hyperplasia   Osteopenia     Past Surgical History:    D & C, hysteroscopy diagnostic, combined    Colonoscopy     Physical Exam   Patient Vitals for the past 24 hrs:   BP Pulse Resp SpO2   01/03/24 2024 120/76 96 25 96 %   01/03/24 1924 127/82 105 24 95 %   01/03/24 1848 136/89 108 23 96 %   01/03/24 1828 126/88 110 (!) 32 96 %   01/03/24 1818 (!) 147/82 111 21 94 %   01/03/24 1758 133/85 113 (!) 37 92 %   01/03/24 1748 (!) 155/82 112 (!) 44 93 %   01/03/24 1617 130/81 (!) 124 29 96 %   01/03/24 1602 (!) 146/82 (!) 128 20 96 %   01/03/24 1547 135/88 (!) 128 (!) 33 96 %   01/03/24 1532 138/79 (!) 124 (!) 36 98 %   01/03/24 1517 (!) 145/80 (!) 122 (!) 39 98 %   01/03/24 1503 (!) 188/107 -- -- --   01/03/24 1455 (!) 154/96 (!) 122 (!) 40 97 %   01/03/24 1453 -- (!) 122 (!) 34 97 %   01/03/24 1447 (!) 156/117 (!) 121 -- 98 %        Physical Exam  General: Large adult female in respiratory distress  Eyes: PERRL, Conjunctive within normal limits.  No scleral icterus.  ENT: Moist mucous membranes, oropharynx clear.   CV: Normal S1S2, no murmur, rub or gallop.  Tachycardic,  regular.  No appreciable JVD.  Resp: Coarse breath sounds diffusely with increased crackles at the bases bilaterally.  Increased work of breathing.  Tachypneic.  Grunting with breathing.   GI: Abdomen is soft, nontender and nondistended. No palpable masses. No rebound or guarding.  MSK: Bilateral lower extremity edema, symmetric. Nontender. Normal active range of motion.  Skin: Warm and dry. No rashes or lesions or ecchymoses on visible skin.  Neuro: Alert and oriented. Responds appropriately to all questions and commands. No focal findings appreciated. Normal muscle tone.  Psych: Anxious appearing.    Emergency Department Course   ECG  ECG taken at 1448, ECG read at 1449  Sinus tachycardia  Possible left atrial enlargement  Borderline ECG   Rate 122 bpm. DE interval 160 ms. QRS duration 78 ms. QT/QTc 292/416 ms. P-R-T axes 50 6 49.     Imaging:  CT Chest Pulmonary Embolism w Contrast   Final Result   IMPRESSION:   1.  No PE or dissection.      2.  3.9 cm in greatest radial dimension enlargement of the main pulmonary artery which can be associated with pulmonary arterial hypertension.      3.  Advanced scattered bilateral groundglass and interstitial lung infiltrates which are nonspecific, but may be inflammatory in nature with presumed significant mediastinal/hilar adenopathy.      4.  Findings of mosaic perfusion in both lungs which is nonspecific, however this is most typically associated with changes of air trapping secondary to small airway inflammation.      XR Chest Port 1 View   Final Result   IMPRESSION: Multifocal interstitial and patchy opacities in the lungs   likely due to pulmonary edema or atypical pneumonia. No pleural   effusion or pneumothorax. Mild cardiomegaly.      KEESHA TRISTAN MD            SYSTEM ID:  [id number]             Laboratory:  Labs Ordered and Resulted from Time of ED Arrival to Time of ED Departure   COMPREHENSIVE METABOLIC PANEL - Abnormal       Result Value    Sodium 137       Potassium 4.3      Carbon Dioxide (CO2) 25      Anion Gap 14      Urea Nitrogen 19.4      Creatinine 0.75      GFR Estimate 86      Calcium 9.1      Chloride 98      Glucose 142 (*)     Alkaline Phosphatase 122      AST 41      ALT 34      Protein Total 8.5 (*)     Albumin 4.5      Bilirubin Total 0.5     TROPONIN T, HIGH SENSITIVITY - Abnormal    Troponin T, High Sensitivity 17 (*)    D DIMER QUANTITATIVE - Abnormal    D-Dimer Quantitative 1.29 (*)    INFLUENZA A/B, RSV, & SARS-COV2 PCR - Abnormal    Influenza A PCR Negative      Influenza B PCR Negative      RSV PCR Positive (*)     SARS CoV2 PCR Negative     ISTAT GASES LACTATE VENOUS POCT - Abnormal    Lactic Acid POCT 1.4      Bicarbonate Venous POCT 30 (*)     O2 Sat, Venous POCT 43 (*)     pCO2 Venous POCT 49      pH Venous POCT 7.40      pO2 Venous POCT 25     ISTAT BASIC CHEM ICA HEMATOCRIT POCT - Abnormal    Chloride POCT 99      Potassium POCT 4.3      Sodium POCT 138      UREA NITROGEN POCT 21      Calcium, Ionized Whole Blood POCT 4.8      Glucose Whole Blood POCT 141 (*)     Anion Gap POCT 17.0 (*)     Hemoglobin POCT 15.3      Hematocrit POCT 45      Creatinine POCT 0.7      TOTAL CO2 POCT 28     ISTAT GASES LACTATE VENOUS POCT - Abnormal    Lactic Acid POCT 1.0      Bicarbonate Venous POCT 26      O2 Sat, Venous POCT 65 (*)     pCO2 Venous POCT 47      pH Venous POCT 7.34      pO2 Venous POCT 36     TROPONIN T, HIGH SENSITIVITY - Abnormal    Troponin T, High Sensitivity 20 (*)    MAGNESIUM - Normal    Magnesium 2.0     TSH WITH FREE T4 REFLEX - Normal    TSH 2.99     NT PROBNP INPATIENT - Normal    N terminal Pro BNP Inpatient 222     CBC WITH PLATELETS AND DIFFERENTIAL    WBC Count 10.1      RBC Count 4.70      Hemoglobin 14.8      Hematocrit 44.0      MCV 94      MCH 31.5      MCHC 33.6      RDW 13.0      Platelet Count 165      % Neutrophils        % Lymphocytes        % Monocytes        % Eosinophils        % Basophils        % Immature  Granulocytes        NRBCs per 100 WBC 0      Absolute Neutrophils        Absolute Lymphocytes        Absolute Monocytes        Absolute Eosinophils        Absolute Basophils        Absolute Immature Granulocytes        Absolute NRBCs 0.0     DIFFERENTIAL    % Neutrophils 77      % Lymphocytes 16      % Monocytes 7      % Eosinophils 0      % Basophils 0      Absolute Neutrophils 7.8      Absolute Lymphocytes 1.6      Absolute Monocytes 0.7      Absolute Eosinophils 0.0      Absolute Basophils 0.0      RBC Morphology Confirmed RBC Indices      Platelet Assessment        Value: Automated Count Confirmed. Platelet morphology is normal.        Emergency Department Course & Assessments:       Interventions:  Medications   furosemide (LASIX) 10 MG/ML injection (has no administration in time range)   ipratropium - albuterol 0.5 mg/2.5 mg/3 mL (DUONEB) neb solution 3 mL (3 mLs Nebulization $Given 1/3/24 1453)   methylPREDNISolone sodium succinate (solu-MEDROL) injection 125 mg (125 mg Intravenous $Given 1/3/24 1453)   furosemide (LASIX) injection 40 mg (40 mg Intravenous $Given 1/3/24 1452)   LORazepam (ATIVAN) injection 0.5 mg (0.5 mg Intravenous $Given 1/3/24 1504)   sodium chloride (PF) 0.9% PF flush 100 mL (90 mLs Intravenous $Given 1/3/24 1721)   iopamidol (ISOVUE-370) solution 500 mL (77 mLs Intravenous $Given 1/3/24 1721)        Assessments:  1445 I obtained history and examined the patient as noted above. RT is at bedside at this time.   1512 I rechecked the patient.  1537 I rechecked the patient again and explained findings to patient and her family. Patient's daughter adds that the patient's primary care doctor has put in an order for hospice care, per the patient's request. Peyton adds that she would like to be DNR/DNI at this time.  1745 I rechecked the patient. She looks and feels improved at this time.   1808  [facility] notifies me that there [facility]. Plan is to check for beds in system.    1849 I reassessed the patient.    She is calm.  Breathing has improved.  BiPAP still in place.  She is agreeable with the plan of transfer to [facility] if bed available.  2009 I reassessed the patient.  She is using her phone and is alert and appropriate.  She is stable on BiPAP.    Independent Interpretation (X-rays, CTs, rhythm strip):  I reviewed the patient's chest x-ray.  Pulmonary edema versus inflammatory change, worse appearing at the bases.  No focal infiltrate appreciated.  No pneumothorax.    Consultations/Discussion of Management or Tests:  2020 Discussed case with Dr. Guerrero, [facility], who accept the patient for transfer.        Social Determinants of Health affecting care:   None    Disposition:  The patient was transferred to [facility] via EMS in stable but guarded condition. Dr. Guerrero accepted the patient for transfer.     Impression & Plan    CMS Diagnoses: None    Critical care time exclusive of procedures: 48 minutes    Medical Decision Making:  Mary Weiland is a 68-year-old female with pulmonary fibrosis with chronic oxygen dependent respiratory failure who presents emergency department in acute respiratory distress.  She was in extremis on arrival however did respond to BiPAP.  There were immediate concerns for possible need for invasive airway support however, she had slow improvement over her stay.  Multiple etiologies were considered including infection such as pneumonia or virus, CHF, PE, increased inflammation secondary to pulmonary fibrosis, pulmonary hypertension, etc.  She was tachycardic and hypertensive on arrival.  VBG's were reassuring with no severe acidosis.  She was afebrile.  Interventions as above were performed with slow improvement over her stay.  Ultimately, diagnostics indicated this is less likely CHF.  No sign of PE.  RSV was positive and this is possibly the trigger of a decompensation in her baseline respiratory  failure.  She was requiring ongoing BiPAP but overall was improved.  There were no intermediate level care beds here at [facility] and therefore she is transferred to [facility] for ongoing care.  The patient was agreeable this plan.  All questions were answered prior to transfer of care.    Diagnosis:    ICD-10-CM    1. Acute on chronic respiratory failure, unspecified whether with hypoxia or hypercapnia (H)  J96.20       2. RSV infection  B33.8       3. Pulmonary fibrosis (H)  J84.10            Scribe Disclosure:  I, Angela Topete, am serving as a scribe at 2:49 PM on 1/3/2024 to document services personally performed by Aarti Jacobs MD based on my observations and the provider's statements to me.   1/3/2024   Aarti Jacobs MD Jonkman, Tracy Dianne, MD  01/03/24 [id number]

## 2024-01-03 NOTE — TELEPHONE ENCOUNTER
I am not familiar with this patient so cannot say, and notes do not say she is at end of life. She has diagnosis that could support it, and if they do not want to to treat her issues she has currently, it may be end of life   I am not sure how fast hospice can be involved. Going through hospital could be option to get set up at home or inpatient hospice    I can place hospice referral and they can assess- Oziel is her provider - he is out of office today

## 2024-01-03 NOTE — ED NOTES
[facility]  ED Nurse Handoff Report    ED Chief complaint: Shortness of Breath  . ED Diagnosis:   Final diagnoses:   None       Allergies:   Allergies   Allergen Reactions    No Known Allergies        Code Status: Full Code    Activity level - Baseline/Home:  independent.  Activity Level - Current:   assist of 2.   Lift room needed: No.   Bariatric: No   Needed: No   Isolation: No.   Infection: Not Applicable.     Respiratory status: BiPap    Vital Signs (within 30 minutes):   Vitals:    01/03/24 1447 01/03/24 1453 01/03/24 1455 01/03/24 1503   BP: (!) 156/117  (!) 154/96 (!) 188/107   Pulse: (!) 121 (!) 122 (!) 122    Resp:  (!) 34 (!) 40    SpO2: 98% 97% 97%        Cardiac Rhythm:  ,   Cardiac  Cardiac Rhythm: Sinus tachycardia  Pain level:    Patient confused: No.   Patient Falls Risk: nonskid shoes/slippers when out of bed and patient and family education.   Elimination Status:  ursula      Patient Report - Initial Complaint: SOB.   Focused Assessment: pt comes to the ED with increased SOB, pt has a hx of pulmonary fibrosis.  Over the past 24 hour pt states that her breathing has gotten worse and worse and she has been unable to catch her breath.  Per EMS pt appeared in respiratory distress on arrival and pt was put on CPAP in the rig.  Pt was put on BiPAP on arrival and since she came to the ED pt appears to be doing better.  She is alert and oriented X4 and VBGs have improved.      Abnormal Results:   Labs Ordered and Resulted from Time of ED Arrival to Time of ED Departure   D DIMER QUANTITATIVE - Abnormal       Result Value    D-Dimer Quantitative 1.29 (*)    ISTAT GASES LACTATE VENOUS POCT - Abnormal    Lactic Acid POCT 1.4      Bicarbonate Venous POCT 30 (*)     O2 Sat, Venous POCT 43 (*)     pCO2 Venous POCT 49      pH Venous POCT 7.40      pO2 Venous POCT 25     ISTAT BASIC CHEM ICA HEMATOCRIT POCT - Abnormal    Chloride POCT 99      Potassium POCT 4.3      Sodium POCT  138      UREA NITROGEN POCT 21      Calcium, Ionized Whole Blood POCT 4.8      Glucose Whole Blood POCT 141 (*)     Anion Gap POCT 17.0 (*)     Hemoglobin POCT 15.3      Hematocrit POCT 45      Creatinine POCT 0.7      TOTAL CO2 POCT 28     ISTAT GASES LACTATE VENOUS POCT - Abnormal    Lactic Acid POCT 1.0      Bicarbonate Venous POCT 26      O2 Sat, Venous POCT 65 (*)     pCO2 Venous POCT 47      pH Venous POCT 7.34      pO2 Venous POCT 36     CBC WITH PLATELETS AND DIFFERENTIAL    WBC Count 10.1      RBC Count 4.70      Hemoglobin 14.8      Hematocrit 44.0      MCV 94      MCH 31.5      MCHC 33.6      RDW 13.0      Platelet Count 165      % Neutrophils        % Lymphocytes        % Monocytes        % Eosinophils        % Basophils        % Immature Granulocytes        NRBCs per 100 WBC 0      Absolute Neutrophils        Absolute Lymphocytes        Absolute Monocytes        Absolute Eosinophils        Absolute Basophils        Absolute Immature Granulocytes        Absolute NRBCs 0.0     DIFFERENTIAL    % Neutrophils 77      % Lymphocytes 16      % Monocytes 7      % Eosinophils 0      % Basophils 0      Absolute Neutrophils 7.8      Absolute Lymphocytes 1.6      Absolute Monocytes 0.7      Absolute Eosinophils 0.0      Absolute Basophils 0.0      RBC Morphology Confirmed RBC Indices      Platelet Assessment        Value: Automated Count Confirmed. Platelet morphology is normal.   COMPREHENSIVE METABOLIC PANEL   TROPONIN T, HIGH SENSITIVITY   MAGNESIUM   TSH WITH FREE T4 REFLEX   NT PROBNP INPATIENT   INFLUENZA A/B, RSV, & SARS-COV2 PCR        XR Chest Port 1 View   Final Result   IMPRESSION: Multifocal interstitial and patchy opacities in the lungs   likely due to pulmonary edema or atypical pneumonia. No pleural   effusion or pneumothorax. Mild cardiomegaly.      KEESHA TRISTAN MD            SYSTEM ID:  [id number]      CT Chest Pulmonary Embolism w Contrast    (Results Pending)       Treatments provided: labs,  imaging  Family Comments: family at the bedside and supportive in cares  OBS brochure/video discussed/provided to patient:  No  ED Medications:   Medications   furosemide (LASIX) 10 MG/ML injection (has no administration in time range)   ipratropium - albuterol 0.5 mg/2.5 mg/3 mL (DUONEB) neb solution 3 mL (3 mLs Nebulization $Given 1/3/24 1453)   methylPREDNISolone sodium succinate (solu-MEDROL) injection 125 mg (125 mg Intravenous $Given 1/3/24 1453)   furosemide (LASIX) injection 40 mg (40 mg Intravenous $Given 1/3/24 1452)   LORazepam (ATIVAN) injection 0.5 mg (0.5 mg Intravenous $Given 1/3/24 1508)       Drips infusing:  No  For the majority of the shift this patient was Green.   Interventions performed were n/a.    Sepsis treatment initiated: No    Cares/treatment/interventions/medications to be completed following ED care: see hospitalist notes, pt does get anxious at times on BiPAP has required a small dose of ativan to help keep her calm    ED Nurse Name: Camille Shetty, RN  4:07 PM

## 2024-01-03 NOTE — TELEPHONE ENCOUNTER
"Left a voicemail asking patient to call the clinic back.     Please advise of message below, and hospice referral.          (The following message was accidentally placed in confidential notes, and was sent to Vanessa prior to message below)     S-(situation): Patient and daughter (Jennifer) call together, requesting patient get orders for hospice     B-(background): History of idiopathic pulmonary fibrosis , pulmonary hypertension, diabetes      A-(assessment): Daughter states patient's health has been declining over the last 48 hours. Patient developed a cough, and can no longer breathing while laying flat. Patient has not other cold-like symptoms or fever. Daughter has not tested patient for Covid-19 yet. Patient is no longer eating any food other than Ensure occasionally due to not having an appetite. Patient seems alert to writer, and is able to speak. Patient states she thinks her time is \"coming to an end\" and would like to die at home.      R-(recommendations): writer advised that if patient needs end of life care now, patient should seek care in ER. Patient declines. Patient states she would like a referral to hospice so she can stay home. Will route to primary care provider to review. Last office visit 10/9/23        "

## 2024-01-03 NOTE — CONFIDENTIAL NOTE
Left a voicemail asking patient to call the clinic back.    Please advise of message below, and hospice referral.

## 2024-01-03 NOTE — PROGRESS NOTES
"         [facility]  Respiratory Care Note      A BiPAP of  14/7 @ 45% was applied to the pt via a medium mask for an increase in WOB and/or SOB.  The bridge of the nose looks good and remains intact. Pt is tolerating it well. Will continue to monitor and assess the pt's current respiratory status and needs.    Vital signs:      BP: (!) 154/96 Pulse: (!) 122   Resp: (!) 40 SpO2: 97 % O2 Device: BiPAP/CPAP Oxygen Delivery: 45 LPM      Estimated body mass index is 46.2 kg/m  as calculated from the following:    Height as of 12/6/23: 1.702 m (5' 7\").    Weight as of 12/6/23: 133.8 kg (295 lb).      Past Medical History:   Diagnosis Date    Hypercholesteraemia     Hypertension     NOVA (obstructive sleep apnea) 6/14/2021       Past Surgical History:   Procedure Laterality Date    D & C      DILATION AND CURETTAGE, HYSTEROSCOPY DIAGNOSTIC, COMBINED N/A 8/10/2015    Procedure: COMBINED DILATION AND CURETTAGE, HYSTEROSCOPY DIAGNOSTIC;  Surgeon: Scarlett Ridley MD;  Location:  OR       Family History   Problem Relation Age of Onset    Esophageal Cancer Father     Diabetes Paternal Grandmother         Type II       Social History     Tobacco Use    Smoking status: Never    Smokeless tobacco: Never   Substance Use Topics    Alcohol use: Yes     Comment: Ioana Salse RT  [facility]  1/3/2024    "

## 2024-01-03 NOTE — CONFIDENTIAL NOTE
"S-(situation): Patient and daughter (Jennifer) call together, requesting patient get orders for hospice    B-(background): History of idiopathic pulmonary fibrosis , pulmonary hypertension, diabetes     A-(assessment): Daughter states patient's health has been declining over the last 48 hours. Patient developed a cough, and can no longer breathing while laying flat. Patient has not other cold-like symptoms or fever. Daughter has not tested patient for Covid-19 yet. Patient is no longer eating any food other than Ensure occasionally due to not having an appetite. Patient seems alert to writer, and is able to speak. Patient states she thinks her time is \"coming to an end\" and would like to die at home.     R-(recommendations): writer advised that if patient needs end of life care now, patient should seek care in ER. Patient declines. Patient states she would like a referral to hospice so she can stay home. Will route to primary care provider to review. Last office visit 10/9/23  "

## 2024-01-04 LAB
ANION GAP SERPL CALCULATED.3IONS-SCNC: 9 MMOL/L (ref 7–15)
ATRIAL RATE - MUSE: 122 BPM
BASE EXCESS BLDV CALC-SCNC: 3.8 MMOL/L (ref -7.7–1.9)
BASE EXCESS BLDV CALC-SCNC: 3.9 MMOL/L (ref -7.7–1.9)
BUN SERPL-MCNC: 22 MG/DL (ref 8–23)
CALCIUM SERPL-MCNC: 9.2 MG/DL (ref 8.8–10.2)
CHLORIDE SERPL-SCNC: 99 MMOL/L (ref 98–107)
CREAT SERPL-MCNC: 0.7 MG/DL (ref 0.51–0.95)
CREAT SERPL-MCNC: 0.7 MG/DL (ref 0.51–0.95)
DEPRECATED HCO3 PLAS-SCNC: 30 MMOL/L (ref 22–29)
DIASTOLIC BLOOD PRESSURE - MUSE: NORMAL MMHG
EGFRCR SERPLBLD CKD-EPI 2021: >90 ML/MIN/1.73M2
EGFRCR SERPLBLD CKD-EPI 2021: >90 ML/MIN/1.73M2
ERYTHROCYTE [DISTWIDTH] IN BLOOD BY AUTOMATED COUNT: 13 % (ref 10–15)
GLUCOSE BLDC GLUCOMTR-MCNC: 185 MG/DL (ref 70–99)
GLUCOSE BLDC GLUCOMTR-MCNC: 194 MG/DL (ref 70–99)
GLUCOSE BLDC GLUCOMTR-MCNC: 194 MG/DL (ref 70–99)
GLUCOSE BLDC GLUCOMTR-MCNC: 200 MG/DL (ref 70–99)
GLUCOSE BLDC GLUCOMTR-MCNC: 202 MG/DL (ref 70–99)
GLUCOSE BLDC GLUCOMTR-MCNC: 206 MG/DL (ref 70–99)
GLUCOSE BLDC GLUCOMTR-MCNC: 207 MG/DL (ref 70–99)
GLUCOSE BLDC GLUCOMTR-MCNC: 208 MG/DL (ref 70–99)
GLUCOSE SERPL-MCNC: 202 MG/DL (ref 70–99)
HCO3 BLDV-SCNC: 30 MMOL/L (ref 21–28)
HCO3 BLDV-SCNC: 31 MMOL/L (ref 21–28)
HCT VFR BLD AUTO: 40.4 % (ref 35–47)
HGB BLD-MCNC: 13.3 G/DL (ref 11.7–15.7)
INTERPRETATION ECG - MUSE: NORMAL
MCH RBC QN AUTO: 31 PG (ref 26.5–33)
MCHC RBC AUTO-ENTMCNC: 32.9 G/DL (ref 31.5–36.5)
MCV RBC AUTO: 94 FL (ref 78–100)
O2/TOTAL GAS SETTING VFR VENT: 40 %
O2/TOTAL GAS SETTING VFR VENT: 50 %
OXYHGB MFR BLDV: 92 % (ref 70–75)
P AXIS - MUSE: 50 DEGREES
PCO2 BLDV: 50 MM HG (ref 40–50)
PCO2 BLDV: 58 MM HG (ref 40–50)
PH BLDV: 7.34 [PH] (ref 7.32–7.43)
PH BLDV: 7.38 [PH] (ref 7.32–7.43)
PLATELET # BLD AUTO: 170 10E3/UL (ref 150–450)
PO2 BLDV: 68 MM HG (ref 25–47)
PO2 BLDV: 85 MM HG (ref 25–47)
POTASSIUM SERPL-SCNC: 4 MMOL/L (ref 3.4–5.3)
PR INTERVAL - MUSE: 160 MS
QRS DURATION - MUSE: 78 MS
QT - MUSE: 292 MS
QTC - MUSE: 416 MS
R AXIS - MUSE: 6 DEGREES
RBC # BLD AUTO: 4.29 10E6/UL (ref 3.8–5.2)
SODIUM SERPL-SCNC: 138 MMOL/L (ref 135–145)
SYSTOLIC BLOOD PRESSURE - MUSE: NORMAL MMHG
T AXIS - MUSE: 49 DEGREES
VENTRICULAR RATE- MUSE: 122 BPM
WBC # BLD AUTO: 10.2 10E3/UL (ref 4–11)

## 2024-01-04 PROCEDURE — 120N000013 HC R&B IMCU

## 2024-01-04 PROCEDURE — 94640 AIRWAY INHALATION TREATMENT: CPT | Mod: 76

## 2024-01-04 PROCEDURE — 94660 CPAP INITIATION&MGMT: CPT

## 2024-01-04 PROCEDURE — 250N000009 HC RX 250: Performed by: INTERNAL MEDICINE

## 2024-01-04 PROCEDURE — 82803 BLOOD GASES ANY COMBINATION: CPT | Performed by: INTERNAL MEDICINE

## 2024-01-04 PROCEDURE — 999N000157 HC STATISTIC RCP TIME EA 10 MIN

## 2024-01-04 PROCEDURE — 80048 BASIC METABOLIC PNL TOTAL CA: CPT | Performed by: INTERNAL MEDICINE

## 2024-01-04 PROCEDURE — 99223 1ST HOSP IP/OBS HIGH 75: CPT | Mod: AI | Performed by: NURSE PRACTITIONER

## 2024-01-04 PROCEDURE — 94640 AIRWAY INHALATION TREATMENT: CPT

## 2024-01-04 PROCEDURE — 99233 SBSQ HOSP IP/OBS HIGH 50: CPT | Performed by: HOSPITALIST

## 2024-01-04 PROCEDURE — C9113 INJ PANTOPRAZOLE SODIUM, VIA: HCPCS | Performed by: INTERNAL MEDICINE

## 2024-01-04 PROCEDURE — 250N000009 HC RX 250

## 2024-01-04 PROCEDURE — 85027 COMPLETE CBC AUTOMATED: CPT | Performed by: INTERNAL MEDICINE

## 2024-01-04 PROCEDURE — 250N000012 HC RX MED GY IP 250 OP 636 PS 637: Performed by: INTERNAL MEDICINE

## 2024-01-04 PROCEDURE — 99207 PR NO BILLABLE SERVICE THIS VISIT: CPT

## 2024-01-04 PROCEDURE — 250N000011 HC RX IP 250 OP 636: Performed by: INTERNAL MEDICINE

## 2024-01-04 PROCEDURE — 250N000011 HC RX IP 250 OP 636

## 2024-01-04 PROCEDURE — 36415 COLL VENOUS BLD VENIPUNCTURE: CPT | Performed by: INTERNAL MEDICINE

## 2024-01-04 RX ORDER — IPRATROPIUM BROMIDE AND ALBUTEROL SULFATE 2.5; .5 MG/3ML; MG/3ML
3 SOLUTION RESPIRATORY (INHALATION) EVERY 4 HOURS PRN
Status: DISCONTINUED | OUTPATIENT
Start: 2024-01-04 | End: 2024-01-09 | Stop reason: HOSPADM

## 2024-01-04 RX ORDER — LORAZEPAM 2 MG/ML
0.5 INJECTION INTRAMUSCULAR EVERY 6 HOURS PRN
Status: DISCONTINUED | OUTPATIENT
Start: 2024-01-04 | End: 2024-01-09 | Stop reason: HOSPADM

## 2024-01-04 RX ORDER — FUROSEMIDE 10 MG/ML
40 INJECTION INTRAMUSCULAR; INTRAVENOUS DAILY
Status: DISCONTINUED | OUTPATIENT
Start: 2024-01-05 | End: 2024-01-06

## 2024-01-04 RX ORDER — FUROSEMIDE 10 MG/ML
40 INJECTION INTRAMUSCULAR; INTRAVENOUS
Status: DISCONTINUED | OUTPATIENT
Start: 2024-01-04 | End: 2024-01-04

## 2024-01-04 RX ORDER — METHYLPREDNISOLONE SODIUM SUCCINATE 40 MG/ML
40 INJECTION, POWDER, LYOPHILIZED, FOR SOLUTION INTRAMUSCULAR; INTRAVENOUS DAILY
Status: DISCONTINUED | OUTPATIENT
Start: 2024-01-05 | End: 2024-01-05

## 2024-01-04 RX ORDER — AZITHROMYCIN 500 MG/1
500 INJECTION, POWDER, LYOPHILIZED, FOR SOLUTION INTRAVENOUS EVERY 24 HOURS
Status: DISCONTINUED | OUTPATIENT
Start: 2024-01-04 | End: 2024-01-06

## 2024-01-04 RX ADMIN — ENOXAPARIN SODIUM 40 MG: 40 INJECTION SUBCUTANEOUS at 12:28

## 2024-01-04 RX ADMIN — METHYLPREDNISOLONE SODIUM SUCCINATE 125 MG: 125 INJECTION, POWDER, FOR SOLUTION INTRAMUSCULAR; INTRAVENOUS at 00:14

## 2024-01-04 RX ADMIN — METHYLPREDNISOLONE SODIUM SUCCINATE 125 MG: 125 INJECTION, POWDER, FOR SOLUTION INTRAMUSCULAR; INTRAVENOUS at 08:58

## 2024-01-04 RX ADMIN — IPRATROPIUM BROMIDE AND ALBUTEROL SULFATE 3 ML: .5; 3 SOLUTION RESPIRATORY (INHALATION) at 11:56

## 2024-01-04 RX ADMIN — ALBUTEROL SULFATE 2.5 MG: 2.5 SOLUTION RESPIRATORY (INHALATION) at 00:31

## 2024-01-04 RX ADMIN — IPRATROPIUM BROMIDE AND ALBUTEROL SULFATE 3 ML: .5; 3 SOLUTION RESPIRATORY (INHALATION) at 20:38

## 2024-01-04 RX ADMIN — FUROSEMIDE 40 MG: 10 INJECTION, SOLUTION INTRAMUSCULAR; INTRAVENOUS at 08:57

## 2024-01-04 RX ADMIN — INSULIN ASPART 2 UNITS: 100 INJECTION, SOLUTION INTRAVENOUS; SUBCUTANEOUS at 17:04

## 2024-01-04 RX ADMIN — INSULIN ASPART 1 UNITS: 100 INJECTION, SOLUTION INTRAVENOUS; SUBCUTANEOUS at 20:18

## 2024-01-04 RX ADMIN — INSULIN ASPART 2 UNITS: 100 INJECTION, SOLUTION INTRAVENOUS; SUBCUTANEOUS at 12:29

## 2024-01-04 RX ADMIN — LORAZEPAM 0.5 MG: 2 INJECTION INTRAMUSCULAR; INTRAVENOUS at 03:15

## 2024-01-04 RX ADMIN — AZITHROMYCIN MONOHYDRATE 500 MG: 500 INJECTION, POWDER, LYOPHILIZED, FOR SOLUTION INTRAVENOUS at 03:52

## 2024-01-04 RX ADMIN — IPRATROPIUM BROMIDE AND ALBUTEROL SULFATE 3 ML: .5; 3 SOLUTION RESPIRATORY (INHALATION) at 15:07

## 2024-01-04 RX ADMIN — PANTOPRAZOLE SODIUM 40 MG: 40 INJECTION, POWDER, FOR SOLUTION INTRAVENOUS at 08:57

## 2024-01-04 RX ADMIN — INSULIN ASPART 2 UNITS: 100 INJECTION, SOLUTION INTRAVENOUS; SUBCUTANEOUS at 08:59

## 2024-01-04 RX ADMIN — INSULIN ASPART 2 UNITS: 100 INJECTION, SOLUTION INTRAVENOUS; SUBCUTANEOUS at 00:16

## 2024-01-04 RX ADMIN — ENOXAPARIN SODIUM 40 MG: 40 INJECTION SUBCUTANEOUS at 00:15

## 2024-01-04 RX ADMIN — PANTOPRAZOLE SODIUM 40 MG: 40 INJECTION, POWDER, FOR SOLUTION INTRAVENOUS at 00:12

## 2024-01-04 RX ADMIN — FUROSEMIDE 40 MG: 10 INJECTION, SOLUTION INTRAMUSCULAR; INTRAVENOUS at 02:08

## 2024-01-04 RX ADMIN — IPRATROPIUM BROMIDE AND ALBUTEROL SULFATE 3 ML: .5; 3 SOLUTION RESPIRATORY (INHALATION) at 03:07

## 2024-01-04 RX ADMIN — IPRATROPIUM BROMIDE AND ALBUTEROL SULFATE 3 ML: .5; 3 SOLUTION RESPIRATORY (INHALATION) at 07:19

## 2024-01-04 RX ADMIN — PANTOPRAZOLE SODIUM 40 MG: 40 INJECTION, POWDER, FOR SOLUTION INTRAVENOUS at 21:57

## 2024-01-04 RX ADMIN — INSULIN ASPART 2 UNITS: 100 INJECTION, SOLUTION INTRAVENOUS; SUBCUTANEOUS at 03:15

## 2024-01-04 ASSESSMENT — ACTIVITIES OF DAILY LIVING (ADL)
ADLS_ACUITY_SCORE: 37
ADLS_ACUITY_SCORE: 46
ADLS_ACUITY_SCORE: 48
ADLS_ACUITY_SCORE: 48
ADLS_ACUITY_SCORE: 37
ADLS_ACUITY_SCORE: 46
ADLS_ACUITY_SCORE: 48
ADLS_ACUITY_SCORE: 37
ADLS_ACUITY_SCORE: 37
ADLS_ACUITY_SCORE: 46
ADLS_ACUITY_SCORE: 37
ADLS_ACUITY_SCORE: 46

## 2024-01-04 NOTE — PROGRESS NOTES
Pt is a direct transfer from [facility] on Bipap. Switched to V60 up on arrival. Blanchable redness on bridge of nose. Liquicell applied. RT will continue to monitor.  Recent Labs   Lab 01/03/24  2337 01/03/24  1541 01/03/24  1448   PHV 7.38 7.34 7.40   PCO2V 50 47 49   PO2V 68* 36 25   HCO3V 30* 26 30*   XAVIER 3.8*  --   --    O2PER 40  --   --        Charlotte Crook, RT

## 2024-01-04 NOTE — CONSULTS
Palliative Care Consultation Note  [facility]      Patient: Mary E Weiland  Date of Admission:  1/3/2024    Requesting Clinician / Team: Dr. Irene/hospitalist   Reason for consult: Goals of care, Patient and family support     Recommendations & Counseling     GOALS OF CARE:  Restorative with limits   Pt affirms DNR/DNI  Peyton's main goal is to return home.  She and family understand that with the current level of BiPAP support, she is not in a position of being able to return home at this time.  The coming days to weeks will be very telling as to what recovery is possible.  She is on 10 L of oxygen at baseline at home.  We do not have too much more wiggle room from there.  Pending her ability to successfully wean O2 support down to her baseline, can continue conversations about her goals of care for the future i.e. does she want to return to the hospital in the future,?  Consideration of hospice.  We did acknowledge today that it is possible that she may deteriorate in the hospital, which may lead to her dying process  Will plan to check in again with patient and family early next week    ADVANCE CARE PLANNING:  No health care directive on file. Per  informed consent policy, next of kin should be involved if patient becomes unable.  There is no POLST form on file, defer to patient and/or next of kin for decisions   Code status: No CPR- Do NOT Intubate    MEDICAL MANAGEMENT:  We are not actively managing symptoms at this time    PSYCHOSOCIAL/SPIRITUAL SUPPORT:  Family -supportive  Al, son Tony, daughter Jennifer at bedside today.  Adult children live out of state and currently in [place] pending Peyton's trajectory    Palliative Care will continue to follow. Thank you for the consult and allowing us to aid in the care of Mary E Weiland.    These recommendations have been discussed with Dr. Irene.    Chart documentation was completed, in part, with [employer] voice-recognition software. Even  though reviewed, some grammatical, spelling, and word errors may remain.     ALAN Amezcua CNP  Securely message with [employer] (more info)  Text page via [facility] Paging/Directory     Palliative Summary/HPI     Mary E Weiland is a 68 year old female with a past medical history significant for chronic idiopathic pulmonary fibrosis oxygen support 5 L at rest, 10 L with activity, pulmonary hypertension, chronic lymphedema, morbid obesity, DM2, and GERD who presents with shortness of breath.  She is found to have acute on chronic hypoxic respiratory failure secondary to RSV.  She is currently requiring BiPAP support.     Today, the patient was seen for:  Goals of care, patient and family support    Palliative Care Summary:   Met with Peyton, along with  Al, son to him, daughter Jennifer, as well as palliative LICSW, Cat Mansfield.     I introduced our role as an extra layer of support and how we help patients and families dealing with serious, potentially life-limiting illnesses. I explained the composition of the palliative care team.  Palliative care helps patients and families navigate their care while focusing on the whole person; providing emotional, social and spiritual support  Palliative care often assists with symptom management, information sharing about what to expect from the illness, available treatment options and what effect those options may have on the disease course, and provide effective communication and caring support.    Prognosis, Goals, & Planning:    Functional Status just prior to this current hospitalization:  Chronic fibrosis, on 5 L of oxygen at rest, 10 L with activity.    Prognosis, Goals, and/or Advance Care Planning:  Restorative with limits   Pt affirms DNR/DNI  Peyton's main goal is to return home.  She and family understand that with the current level of BiPAP support, she is not in a position of being able to return home at this time.  The coming days to weeks will be very  telling as to what recovery is possible.  She is on 10 L of oxygen at baseline at home.  We do not have too much more wiggle room from there.  Pending her ability to successfully wean O2 support down to her baseline, can continue conversations about her goals of care for the future i.e. does she want to return to the hospital in the future,?  Consideration of hospice.  We did acknowledge that it is possible that she may deteriorate in the hospital, which may lead to her dying process    Code Status was addressed today:   Yes, affirmed DNR/DNI    Patient's decision making preferences: with input from medical clinicians and loved ones        Patient has decision-making capacity today for complex decisions: Intact          Coping, Meaning, & Spirituality:   Mood, coping, and/or meaning in the context of serious illness were addressed today: Yes.  Patient states that she is [employer] and has good support from her parish.  She may be open to community in in the near future.    Social:   Living situation:lives with significant other/spouse  Important relationships/caregivers:supportive  Al, son Tony, daughter Jennifer at bedside today.  Adult children live out of [facility] and currently in [place] pending Peyton's trajectory  Areas of fulfillment/irwin: Enjoying time with her 3 grandchildren, doing crossword puzzles, sodoku.  Before her chronic illness worsened, she really enjoyed entertaining/socializing    Medications:  I have reviewed this patient's medication profile and medications from this hospitalization. Notable medications:  Azithromycin  Diltiazem  Breo Ellipta inhaler  Lasix 40 mg IV twice daily  Insulin  DuoNebs  Avapro  Solu-Medrol 125 mg IV every 8 hours  PPI  Dilaudid 0.2 to 0.4 mg IV every 2 hours as needed pain  Ativan 0.5 mg IV every 6 hours as needed anxiety  Oxycodone 2.5 to 5 mg p.o. every 4 hours as needed pain    ROS:  Comprehensive ROS is reviewed and is negative except as here & per HPI:  N/A    Physical Exam   Vital Signs with Ranges  Temp:  [97.5  F (36.4  C)-98.9  F (37.2  C)] 97.5  F (36.4  C)  Pulse:  [] 93  Resp:  [14-44] 25  BP: (107-188)/() 121/69  FiO2 (%):  [40 %-50 %] 40 %  SpO2:  [92 %-98 %] 93 %  286 lbs 2.51 oz  CONSTITUTIONAL: Chronically ill obese woman seen resting in bed in NAD, A&Ox3. Calm and cooperative.  Family at bedside  HEENT: NCAT  RESPIRATORY: NL respiratory effort on BiPAP  NEUROLOGIC: Appropriately responsive during interview  PSYCH: Affect engaged    Data reviewed:  Recent imaging independently reviewed, my comments on pertinents:   1/3 CT chest with scattered bilateral groundglass and interstitial lung infiltrates    Recent lab data independently reviewed, my comments on pertinents:   Na 138  K 4  Creat 0.7  Magnesium 2  Albumin 4.5  Lactic acid 1  Troponin 20  WBC 10.2  Hgb 13.3  Plt 170    Medical Decision Making   Please see A&P for additional details of medical decision making.  MANAGEMENT DISCUSSED with the following over the past 24 hours: Dr. Irene   NOTE(S)/MEDICAL RECORDS REVIEWED over the past 24 hours: H&P, nursing notes  Tests personally interpreted in the past 24 hours:  - CHEST CT showing scattered bilateral groundglass and interstitial lung infiltrates  Tests REVIEWED in the past 24 hours:  - See lab/imaging results included in the data section of the note  - BMP  - CBC  - Lactic Acid  - Magnesium  - Troponin  SUPPLEMENTAL HISTORY, in addition to the patient's history, over the past 24 hours obtained from:   - Dr. Irene, son Tony, daughter Jennifer,  Al  Medical complexity over the past 24 hours:  - high

## 2024-01-04 NOTE — PLAN OF CARE
Neuro: alert and oriented x4, intermittent anxiety  Tele/cardiac: SR  Respiration: BiPAP  Activity: not OOB, independent at baseline  Pain: denies  Drips: PIV SL  Drains/tubes: none  Skin: blanchable redness to bottom  GI/: pure wick in place, no BM this shift, NPO  Aggression color: Green  Isolation: contact and droplet precautions  Plan: wean off BiPAP as able

## 2024-01-04 NOTE — PROGRESS NOTES
RT responded  to RRT call @0300. Upon arrival pt was on BiPAP with increased WOB RR in upper 30s, SpO2 in mid 90s.  BiPAP s/t mode changed to AVAPS mode and Duoneb prn given per NP order. Rt to continue to monitor and assess effect of AVAPS.      Selina Ireland, RT 1/4/2024

## 2024-01-04 NOTE — CONSULTS
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    Hypoxemia in setting of RSV infection and chronic, ambulatory oxygen dependent, DPLD. Her imaging appears similar compared to prior. Unfortunately, there is not specific treatment for RSV beyond supportive care. Given her clear GOC, agree with steroids for now, would decrease to 40mg daily. I don't know of any clinical study showing benefit of azithromycin for viral infections and this is generally not recommended from an antibiotic stewardship standpoint. However, her oxygenation does not appear to be much different than her outpatient baseline given her FiO2 on BiPAP is 30%.    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    Per H&P: \"presents as a " direct admission from [facility] with acute hypoxic respiratory failure.     Patient has significant underlying lung disease with idiopathic pulmonary fibrosis, potentially related to chronic pneumonitis with reflux.  She has followed with pulmonary primarily through [place] [facility], but has also been evaluated by [facility] and more recently pulmonary hypertension team at the [facility].  She did not qualify for lung transplant per [facility] because of her obesity, and has been referred again for additional evaluation and assessment by [facility] team as of December 4.     At baseline, patient requires 5 L nasal cannula O2 with rest, 10 L with activity.  She has not been vaccinated against RSV.     For the past few days, she has had a change in sputum and increased cough.  She thought perhaps this was worsening of her underlying pulmonary fibrosis, as she has had a significant decline in her respiratory status over the past year.  As she was having so much trouble breathing, she actually contacted her care team and requested a hospice consultation as she did not want to go to the hospital and wanted to die at home.  I believe her daughter encouraged her to present to the emergency department, coupled with the fact that her primary clinic was not able to have hospice immediately evaluate her, and this brought patient to the [facility] to be assessed.     Required CPAP with EMS, transition to BiPAP.  Transferred to [facility] [place] given lack of IMC/ICU beds at [facility].     Since initiation on BiPAP, patient is feeling much better.  She has no fevers or shaking chills, but tells me is not uncommon for her to feel alternatingly hot and cold, particularly at night.  Suggestive of hot flashes or night sweats potentially provoked by hypoxemia.  This is more longstanding.     Discussed recent follow-up with pulmonary and her general goals of care.  She is hesitant to undergo additional/repeat  "workup for transplant evaluation at the [facility], but will consider doing it this spring.  Encourage patient to consider this follow-up in February after recovery from current RSV illness if she at all has interest in transplant.  She recognizes her quality of life is poor and outside of transplant she has a progressive terminal illness.  Certainly her thoughts of hospice enrollment are not unreasonable, but with improvement on BiPAP, is happy she presented to the hospital.  Discussed duration of hospitalization, which may be prolonged given her baseline tenuous respiratory status.  Essentially will need improvement in inflammation and any structural changes from RSV in order to wean closer back to baseline home O2 needs, which are near maximal therapy with activity at 10 L.     With her RSV, she has diffuse wheezing.  Unable to appreciate crackles at this time despite her pulmonary fibrosis diagnosis.  Is possible that she will have substantial improvement with anti-inflammatories and nebulizer therapies if primary component of worsening dyspnea and hypoxia is from air trapping, as would be suggested on CT PE study at outside hospital.  Had only been using her albuterol inhaler once per day prior to hospitalization.\"    Today reports she get SOB with BiPAP off. Otherwise feeling slightly better since hospitalization.    HOME MEDICATIONS     Medications Prior to Admission   Medication Sig Dispense Refill Last Dose    albuterol (PROAIR HFA/PROVENTIL HFA/VENTOLIN HFA) 108 (90 Base) MCG/ACT inhaler Inhale 2 puffs into the lungs every 6 hours       calcium-vitamin D (CALTRATE) 600-400 MG-UNIT per tablet Take 1 tablet by mouth 2 times daily       diltiazem ER COATED BEADS (CARDIZEM CD/CARTIA XT) 300 MG 24 hr capsule Take 1 capsule (300 mg) by mouth daily 90 capsule 3     fluticasone-vilanterol (BREO ELLIPTA) 200-25 MCG/INH inhaler Inhale 1 puff into the lungs daily       furosemide (LASIX) 40 MG tablet " Take 1 tablet (40 mg) by mouth daily (Patient taking differently: Take 40 mg by mouth 2 times daily Patient taking 1 tablet twice a day) 90 tablet 3     irbesartan (AVAPRO) 300 MG tablet TAKE 1 TABLET(300 MG) BY MOUTH DAILY 90 tablet 0     omeprazole (PRILOSEC) 40 MG DR capsule Take 1 capsule (40 mg) by mouth daily       pirfenidone (ESBRIET) 267 MG capsule First week: one twice daily, one capsule 3 times per day for day 7-14, 2 capsules 3 times per day on day 15-30, then 3 capsules 3 times per day after day 30.       potassium chloride ER (K-TAB) 20 MEQ CR tablet Take 1 tablet (20 mEq) by mouth daily (Patient taking differently: Take 20 mEq by mouth 2 times daily Patient taking one tablet twice daily) 90 tablet 3        PAST MEDICAL HISTORY      Past Medical History:   Diagnosis Date    Hypercholesteraemia     Hypertension     NOVA (obstructive sleep apnea) 6/14/2021     Past Surgical History:   Procedure Laterality Date    D & C      DILATION AND CURETTAGE, HYSTEROSCOPY DIAGNOSTIC, COMBINED N/A 8/10/2015    Procedure: COMBINED DILATION AND CURETTAGE, HYSTEROSCOPY DIAGNOSTIC;  Surgeon: Scarlett Ridley MD;  Location: [facility] OR       ALLERGIES     Allergies   Allergen Reactions    No Known Allergies        SOCIAL / SUBSTANCE HISTORY     Social History     Socioeconomic History    Marital status:      Spouse name: Not on file    Number of children: Not on file    Years of education: Not on file    Highest education level: Not on file   Occupational History    Not on file   Tobacco Use    Smoking status: Never    Smokeless tobacco: Never   Vaping Use    Vaping Use: Never used   Substance and Sexual Activity    Alcohol use: Yes     Comment: Occasion    Drug use: No    Sexual activity: Yes     Partners: Male   Other Topics Concern    Not on file   Social History Narrative    Not on file     Social Determinants of Health     Financial Resource Strain: Low Risk  (10/9/2023)    Financial Resource Strain     Within  "the past 12 months, have you or your family members you live with been unable to get utilities (heat, electricity) when it was really needed?: No   Food Insecurity: Low Risk  (10/9/2023)    Food Insecurity     Within the past 12 months, did you worry that your food would run out before you got money to buy more?: No     Within the past 12 months, did the food you bought just not last and you didn t have money to get more?: No   Transportation Needs: Low Risk  (10/9/2023)    Transportation Needs     Within the past 12 months, has lack of transportation kept you from medical appointments, getting your medicines, non-medical meetings or appointments, work, or from getting things that you need?: No   Physical Activity: Not on file   Stress: Not on file   Social Connections: Not on file   Interpersonal Safety: Not on file   Housing Stability: Low Risk  (10/9/2023)    Housing Stability     Do you have housing? : Yes     Are you worried about losing your housing?: No       FAMILY HISTORY     Family History   Problem Relation Age of Onset    Esophageal Cancer Father     Diabetes Paternal Grandmother         Type II       REVIEW OF SYSTEMS   A comprehensive review of systems was negative except for items noted in HPI/Subjective.    PHYSICAL EXAMINATION   Temp (24hrs), Av.4  F (36.9  C), Min:97.5  F (36.4  C), Max:98.9  F (37.2  C)    Vital signs:  Temp: 98.7  F (37.1  C) Temp src: Axillary BP: 121/69 Pulse: 93   Resp: 25 SpO2: 93 % O2 Device: BiPAP/CPAP   Height: 170.2 cm (5' 7\") Weight: 129.8 kg (286 lb 2.5 oz)  Estimated body mass index is 44.82 kg/m  as calculated from the following:    Height as of this encounter: 1.702 m (5' 7\").    Weight as of this encounter: 129.8 kg (286 lb 2.5 oz).        I/O last 3 completed shifts:  In: -   Out: 900 [Urine:900]    CONSTITUTIONAL/GENERAL APPEARANCE: Alert. No Apparent Distress.  PSYCHIATRIC: Pleasant and appropriate mood and affect. Oriented x 3.  EARS, NOSE,THROAT,MOUTH: " External ears and nose overall normal. Normal oral mucosa.   NECK: Neck appearance normal. No neck masses and the thyroid not enlarged.   RESPIRATORY: Non-labored effort.   CARDIOVASCULAR: S1, S2, regular rate and rhythm. Extremities with no edema.  ABDOMEN: round, soft, non-tender, non-distended, no palpable masses. No presence of hernia.  LYMPHATIC: no cervical or axillary lymphadenopathy.  SKIN: Skin color was normal. No clubbing or cyanosis. No palpable skin abnormalities.    LABORATORY ASSESSMENT    Arterial Blood Gas  Recent Labs   Lab 01/04/24 0519 01/03/24 2337   O2PER 50 40     CBC  Recent Labs   Lab 01/04/24 0519 01/03/24  1448   WBC 10.2 10.1   RBC 4.29 4.70   HGB 13.3 14.8  15.3   HCT 40.4 44.0  45   MCV 94 94   MCH 31.0 31.5   MCHC 32.9 33.6   RDW 13.0 13.0    165     BMP  Recent Labs   Lab 01/04/24  1241 01/04/24  1227 01/04/24  0811 01/04/24  0557 01/04/24 0519 01/04/24  0009 01/03/24 2337 01/03/24  1448   NA  --   --   --   --  138  --   --  137  138   POTASSIUM  --   --   --   --  4.0  --   --  4.3  4.3   CHLORIDE  --   --   --   --  99  --   --  99  98   GEOVANNY  --   --   --   --  9.2  --   --  9.1   CO2  --   --   --   --  30*  --   --  25   BUN  --   --   --   --  22.0  --   --  21  19.4   CR  --   --   --   --  0.70  --  0.70 0.75  0.7   * 207* 208* 202* 202*   < >  --  141*  142*    < > = values in this interval not displayed.     INRNo lab results found in last 7 days.   BNPNo lab results found in last 7 days.  VENOUS BLOOD GASES  Recent Labs   Lab 01/04/24 0519 01/03/24 2337 01/03/24  1541 01/03/24  1448   PHV 7.34 7.38 7.34 7.40   PCO2V 58* 50 47 49   PO2V 85* 68* 36 25   HCO3V 31* 30* 26 30*   XAVIER 3.9* 3.8*  --   --        IMAGING      CTPE 1/2024  IMPRESSION:  1.  No PE or dissection.  2.  3.9 cm in greatest radial dimension enlargement of the main pulmonary artery which can be associated with pulmonary arterial hypertension.  3.  Advanced scattered bilateral  groundglass and interstitial lung infiltrates which are nonspecific, but may be inflammatory in nature with presumed significant mediastinal/hilar adenopathy.  4.  Findings of mosaic perfusion in both lungs which is nonspecific, however this is most typically associated with changes of air trapping secondary to small airway inflammation.    No results found for this visit on 01/03/24.    PFT & OTHER TESTING          No data to display

## 2024-01-04 NOTE — CODE/RAPID RESPONSE
"[facility]  House CATIE RRT Note  1/4/2024   Time called: 0248  RRT called for: Increased work of breathing  Code status: No CPR- Do NOT Intubate    Assessment & Plan   Patient is a 68 year old female with PMH significant for IPF on 5L O2 at home, DM2, GERD, pulmonary HTN, chronic lymphedema who was a direct admission from [facility] now testing positive for RSV and worsening hypoxia requiring BiPAP.    I was paged to the bedside to evaluate Ms. Mary E Weiland for an acute episode of increased work of breathing upon waking from sleep.    Upon my arrival the patient was supine in bed with head elevated. Patient was A/Ox4 and anxious. Patient was on BiPAP with FiO2 50%, SpO2 97%, and RR of 25. Patient was warm and dry though she endorsed being diaphoretic a few minutes prior to the RRT. Patient had 2+ pulses in all extremities. Lung sounds with coarse, expiratory wheezes. Patient denied chest pain, dizziness, light-headedness. She did endorse dyspnea but could not decide if it was worse or better than in the ED. Her main complaint was waking from sleep and feeling a sense of panic that something was wrong. Overall her exam was unchanged and she appeared to be tolerating the BiPAP but she was very anxious. I ordered AVAPS mode, duoneb, and ativan and stayed on the unit to assess for effectiveness.    /85   Pulse 97   Temp 98.9  F (37.2  C)   Resp 27   Ht 1.702 m (5' 7\")   Wt 129.8 kg (286 lb 2.5 oz)   SpO2 97%   BMI 44.82 kg/m        Diagnosis:  -- Acute on chronic hypoxic respiratory failure  Currently on BiPAP, I asked RT to change to AVAPs mode to assess for improved response/tolerance. Patient had just received lasix 40mg prior to RRT so if there is a pulmonary edema component she should show signs of improvement. I ordered Duonebs PRN and one administration now as she sounds wheezy and I suspect bronchitis/bronchiolitis from RSV.  -- Anxiety 2/2 above  Patient stated that " she woke in a panic and was sweaty. She stated that she did not feel as though her breathing was much worse than earlier, but she felt very scared and that something might be wrong with her. Patient's HR increased to the 30-40s when staff was in the room but without stimulation she was in the low 20s when staff left the room. I ordered PRN ativan for anxiety.    Plan:  -- Duonebs PRN, give one dose now  -- Lorazepam 0.5mg IV Q6h PRN for anxiety, give one dose now  -- Trial AVAPS mode BiPAP to see if patient has improved tolerance  -- Overall no significant change in plan of care from admitting hospitalist. Patient continued to reinforce not wanting intubation or CPR.    15 minutes after interventions were completed the patient stated she was feeling better and shortly after that she was sleeping.    At the conclusion of this RRT patient was hemodynamically stable and will remain on current unit    Her history is significant for:  Past Medical History:   Diagnosis Date    Hypercholesteraemia     Hypertension     NOVA (obstructive sleep apnea) 6/14/2021     Past Surgical History:   Procedure Laterality Date    D & C      DILATION AND CURETTAGE, HYSTEROSCOPY DIAGNOSTIC, COMBINED N/A 8/10/2015    Procedure: COMBINED DILATION AND CURETTAGE, HYSTEROSCOPY DIAGNOSTIC;  Surgeon: Scarlett Ridley MD;  Location:  OR       Review of Systems   The 10 point Review of Systems is negative other than noted in the HPI or here.     Allergies   Allergies   Allergen Reactions    No Known Allergies        Physical Exam   Physical Exam  Constitutional:       General: She is in acute distress.   Eyes:      Pupils: Pupils are equal, round, and reactive to light.   Cardiovascular:      Rate and Rhythm: Normal rate.   Pulmonary:      Effort: Respiratory distress present.      Breath sounds: Wheezing present.   Abdominal:      General: Abdomen is flat.      Palpations: Abdomen is soft.   Musculoskeletal:      Right lower leg: No edema.       Left lower leg: No edema.   Skin:     General: Skin is warm.      Capillary Refill: Capillary refill takes less than 2 seconds.   Neurological:      General: No focal deficit present.      Mental Status: She is alert and oriented to person, place, and time.         Vital Signs with Ranges:  Temp:  [98.5  F (36.9  C)] 98.5  F (36.9  C)  Pulse:  [] 94  Resp:  [14-44] 19  BP: (116-188)/() 118/78  FiO2 (%):  [40 %-45 %] 40 %  SpO2:  [92 %-98 %] 95 %  No intake/output data recorded.    Data   Results for orders placed or performed during the hospital encounter of 01/03/24 (from the past 24 hour(s))   Blood gas venous and oxyhgb   Result Value Ref Range    pH Venous 7.38 7.32 - 7.43    pCO2 Venous 50 40 - 50 mm Hg    pO2 Venous 68 (H) 25 - 47 mm Hg    Bicarbonate Venous 30 (H) 21 - 28 mmol/L    FIO2 40     Oxyhemoglobin Venous 92 (H) 70 - 75 %    Base Excess/Deficit 3.8 (H) -7.7 - 1.9 mmol/L   Creatinine   Result Value Ref Range    Creatinine 0.70 0.51 - 0.95 mg/dL    GFR Estimate >90 >60 mL/min/1.73m2   Glucose by meter   Result Value Ref Range    GLUCOSE BY METER POCT 194 (H) 70 - 99 mg/dL   Glucose by meter   Result Value Ref Range    GLUCOSE BY METER POCT 206 (H) 70 - 99 mg/dL     COVID-19 PCR Results          1/3/2024    15:39   COVID-19 PCR Results   SARS CoV2 PCR Negative      COVID-19 Antibody Results, Testing for Immunity           No data to display                Time Spent on this Encounter   I spent 30 minutes on the unit/floor managing the care of Mary E Weiland. Over 50% of my time was spent counseling the patient and/or coordinating care regarding services listed in this note.    ALAN Alexis, CNP  Hospitalist - House CATIE  Text me on the An[employer] Touch catie for a textback  Text page with web based paging for a callback

## 2024-01-04 NOTE — PHARMACY-ADMISSION MEDICATION HISTORY
Pharmacist Admission Medication History    Admission medication history is complete. The information provided in this note is only as accurate as the sources available at the time of the update.    Information Source(s): Patient and CareEverywhere/SureScripts via phone    Pertinent Information:   Pt has on hand prescriptions for azithromycin, prednisone and sulfamethoxazole/trimethoprim but did not take these medications prior to arrival.    Changes made to PTA medication list:  Added: None  Deleted: calcium  Changed: None    Allergies reviewed with patient and updates made in EHR: no    Medication History Completed By: Joya Hernandez [facility] 1/4/2024 1:53 PM    PTA Med List   Medication Sig Last Dose    albuterol (PROAIR HFA/PROVENTIL HFA/VENTOLIN HFA) 108 (90 Base) MCG/ACT inhaler Inhale 2 puffs into the lungs every 6 hours 1/3/2024    diltiazem ER COATED BEADS (CARDIZEM CD/CARTIA XT) 300 MG 24 hr capsule Take 1 capsule (300 mg) by mouth daily 1/3/2024 at am    fluticasone-vilanterol (BREO ELLIPTA) 200-25 MCG/INH inhaler Inhale 1 puff into the lungs daily 1/3/2024 at am    furosemide (LASIX) 40 MG tablet Take 1 tablet (40 mg) by mouth daily (Patient taking differently: Take 40 mg by mouth 2 times daily Patient taking 1 tablet twice a day) 1/3/2024 at am    irbesartan (AVAPRO) 300 MG tablet TAKE 1 TABLET(300 MG) BY MOUTH DAILY 1/3/2024 at am    omeprazole (PRILOSEC) 40 MG DR capsule Take 1 capsule (40 mg) by mouth daily 1/2/2024    pirfenidone (ESBRIET) 267 MG capsule First week: one twice daily, one capsule 3 times per day for day 7-14, 2 capsules 3 times per day on day 15-30, then 3 capsules 3 times per day after day 30. 1/3/2024 at am    potassium chloride ER (K-TAB) 20 MEQ CR tablet Take 1 tablet (20 mEq) by mouth daily (Patient taking differently: Take 20 mEq by mouth 2 times daily Patient taking one tablet twice daily) 1/3/2024 at am

## 2024-01-04 NOTE — PROGRESS NOTES
[facility]    Medicine Progress Note - Hospitalist Service    Date of Admission:  1/3/2024    Assessment & Plan   Acute on chronic hypoxic and hypercapnic respiratory failure  (+) respiratory syncytial virus (RSV) infection 1/3/24, abnormal CT chest  Pulmonary hypertension, history of chronic cor pulmonale  Idiopathic pulmonary fibrosis  History of obstructive sleep apnea  Chronic lymphedema  Morbid obesity  Type II diabetes mellitus, A1c 6.8% 10/12/23  Essential hypertension  Gastroesophageal reflux disease (GERD)  Osteopenia  DNR, DNI code status  DVT prophylaxis  Disposition    Mary E Weiland is a 68 year old female with past medical history above admitted 1/3/2024 with shortness of breath, cough, acute on chronic respiratory failure, and (+) RSV.  Complex medical history including chronic cor pulmonale, pulmonary hypertension, idiopathic pulmonary fibrosis, obstructive sleep apnea, and morbid obesity.  Additional past medical history outlined above.  Pulmonary consulted.  Admission to [facility] with initiation of BiPAP.    Consults - Pulmonary, Palliative Care    Complex patient with acute on chronic respiratory failure, hypoxic and hypercapnic with new RSV infection.  Pulmonary consulted.  Continue BiPAP as ordered, titrate.  CODE STATUS readdressed 1/4/2024 very clear she is DNR/DNI.  This will be respected.  Family at the bedside present and supports this.  Supportive care for RSV infection.  Continue current inhaler and bronchodilators therapy.  Continue IV corticosteroids, methylprednisolone, with pulmonary to further review treatment course.  Continue IV azithromycin.  Difficult situation with pulmonary fibrosis, pulmonary hypertension, chronic cor pulmonale, and new respiratory failure with RSV infection.  Goals of care also discussed, see below.  Patient and family request further discussion regarding goals of care and palliative care formally consulted.  Patient discussed with  palliative care team and help much appreciated.  Depending on clinical course further discussion may be needed regarding future hospice care or comfort care measures.  Continue restorative cares for now as above with subspecialty pulmonary consultation as ordered.  Monitor sleep apnea in the setting of acute on chronic respiratory failure.  BiPAP as ordered.  Monitor nocturnal oxygen saturations.  History chronic lymphedema, monitor.  Morbid obesity, long-term weight loss appropriate.  Lifestyle modifications as able, follow-up with primary clinic provider.  Monitor blood sugars for diabetes mellitus, type II.  A1c 6.8%.  Anticipate some worsening hyperglycemia with concurrent corticosteroid use.  Diabetic diet.  Monitor blood pressure, recently under satisfactory control.  Prior to admission on extended release diltiazem, irbesartan, with daily furosemide.  Continue extended release diltiazem and irbesartan and monitor.  IV furosemide ordered on admission, pulmonary to further review.  Anticipate corticosteroids could play a role in increasing blood pressure.  Gastroesophageal reflux history, continue IV Protonix, especially with concurrent corticosteroid use.  Monitor symptoms.  Enoxaparin (Lovenox) for DVT prophylaxis measures.  DNR, DNI CODE STATUS confirmed on admission and on 1/4/2024.  Social work consulted for discharge planning.  Estimated length of stay several more days pending clinical course and subspecialty recommendations.    Total time spent caring for patient today thus far 50 minutes.  Total time spent reviewing medical records, interviewing and examining patient (first visit with patient), ordering and reviewing test results, reviewing and ordering subspecialty consults (pulmonary, palliative care), discussing code status, communicating with nursing staff, implementing clinical decision making and coordinating care, documenting in chart, updating family at the bedside, and reviewing care plan with  patient.    Results  CT Chest 1/3/24  1.  No PE or dissection.  2.  3.9 cm in greatest radial dimension enlargement of the main pulmonary artery which can be associated with pulmonary arterial hypertension.  3.  Advanced scattered bilateral groundglass and interstitial lung infiltrates which are nonspecific, but may be inflammatory in nature with presumed significant mediastinal/hilar adenopathy.  4.  Findings of mosaic perfusion in both lungs which is nonspecific, however this is most typically associated with changes of air trapping secondary to small airway inflammation    Chest x-ray 1/4/24  IMPRESSION: Multifocal interstitial and patchy opacities in the lungs  likely due to pulmonary edema or atypical pneumonia. No pleural  effusion or pneumothorax. Mild cardiomegaly.    ECHO 8/9/23  Contrast not used due to no IV access.  Left ventricular systolic function is normal.  The visual ejection fraction is 60-65%.  Regional wall motion abnormalities cannot be excluded due to limited  visualization.  RV systolic function probably normal although accuracy limited in absence of  IV contrast use.  Consider CMR for evaluation of cardiac chambers and function if clinically  appropriate. The study was technically difficult. Compared to the prior study  dated 3/21, there have been no changes.          Diet: NPO for Medical/Clinical Reasons Except for: Meds    DVT Prophylaxis: Enoxaparin (Lovenox) SQ  Hernandez Catheter: Not present  Lines: None     Cardiac Monitoring: ACTIVE order. Indication: imc, resp failure  Code Status: No CPR- Do NOT Intubate      Clinically Significant Risk Factors Present on Admission                  # Hypertension: Noted on problem list   # Non-Invasive mechanical ventilation: current O2 Device: BiPAP/CPAP  # Acute hypoxic respiratory failure: continue supplemental O2 as needed    # DMII: A1C = 6.8 % (Ref range: <5.7 %) within past 6 months    # Severe Obesity: Estimated body mass index is 44.82 kg/m   "as calculated from the following:    Height as of this encounter: 1.702 m (5' 7\").    Weight as of this encounter: 129.8 kg (286 lb 2.5 oz).              Disposition Plan      Expected Discharge Date: 01/07/2024                    Jacoby Irene MD  Hospitalist Service  [facility]  Securely message with [employer] (more info)  Text page via [email or web address] Paging/Directory   ______________________________________________________________________    Interval History   First visit with patient today, lying in bed, family at the bedside including her daughter, son, and .  Admitted overnight for worsening respiratory failure.  Feels short of breath despite BiPAP.  No reported chest pain.  Pulmonary consult requested in addition to patient/family request for palliative care consult.    Physical Exam   Vital Signs: Temp: 98.9  F (37.2  C) Temp src: Axillary BP: 107/65 Pulse: 75   Resp: 26 SpO2: 96 % O2 Device: BiPAP/CPAP    Weight: 286 lbs 2.51 oz    GENERAL awake and alert, sitting up in bed, in mild to moderate respiratory distress, on BiPAP, pleasant and interactive, family present  LUNGS no wheezes or crackles, mild to moderate inspiratory effort  HEART S1, S2 with RRR  ABDOMEN soft, nontender  MUSCULOSKELETAL extremities with   SKIN warm and dry  NEURO moves upper and lower extremities spontaneously and to command  MENTAL STATUS answering questions and following simple commands    Medical Decision Making             Data     I have personally reviewed the following data over the past 24 hrs:    10.2  \   13.3   / 170     138 99 22.0 /  194 (H)   4.0 30 (H) 0.70 \     ALT: 34 AST: 41 AP: 122 TBILI: 0.5   ALB: 4.5 TOT PROTEIN: 8.5 (H) LIPASE: N/A     Trop: 20 (H) BNP: 222     TSH: 2.99 T4: N/A A1C: N/A     Procal: N/A CRP: N/A Lactic Acid: 1.0       INR:  N/A PTT:  N/A   D-dimer:  1.29 (H) Fibrinogen:  N/A       Imaging results reviewed over the past 24 hrs:   Recent Results (from the past 24 " hour(s))   XR Chest Port 1 View    Narrative    XR CHEST PORT 1 VIEW 1/3/2024 3:00 PM    HISTORY: sob    COMPARISON: 6/27/2023        Impression    IMPRESSION: Multifocal interstitial and patchy opacities in the lungs  likely due to pulmonary edema or atypical pneumonia. No pleural  effusion or pneumothorax. Mild cardiomegaly.    KEESHA TRISTAN MD         SYSTEM ID:  [id number]   CT Chest Pulmonary Embolism w Contrast    Narrative    EXAM: CT CHEST PULMONARY EMBOLISM W CONTRAST  LOCATION: [facility]  DATE: 1/3/2024    INDICATION: Dyspnea, hypoxia.  COMPARISON: PET CT 06/27/2023.  TECHNIQUE: CT chest pulmonary angiogram during arterial phase injection of IV contrast. Multiplanar reformats and MIP reconstructions were performed. Dose reduction techniques were used.   CONTRAST: 77 mL Isovue 370.    FINDINGS:  ANGIOGRAM CHEST: The central pulmonary artery is enlarged measuring up to 3.9 cm in greatest radial dimension which is nonspecific, but most typical for pulmonary arterial hypertension. Thoracic aorta is negative for dissection. No CT evidence of right   heart strain. No evidence for pulmonary emboli.    LUNGS AND PLEURA: Similar findings to prior PET CT 06/27/2023 with again seen advanced intermixed interstitial and groundglass infiltrates seen in both lungs with some areas of localized sparing. Mild findings of mosaic perfusion which is nonspecific,   but most typical for air trapping commonly associated with small airway inflammation.    MEDIASTINUM/AXILLAE: Advanced mediastinal/hilar adenopathy which has mildly increased since 06/25/2023. This is presumed to be reactive in nature given the lung infiltrates. Small esophageal hiatal hernia.    CORONARY ARTERY CALCIFICATION: No significant.    UPPER ABDOMEN: Normal.    MUSCULOSKELETAL: Mild to moderate scattered hypertrophic changes most marked in the mid/lower portions of the thoracic spine.      Impression    IMPRESSION:  1.  No PE or  dissection.    2.  3.9 cm in greatest radial dimension enlargement of the main pulmonary artery which can be associated with pulmonary arterial hypertension.    3.  Advanced scattered bilateral groundglass and interstitial lung infiltrates which are nonspecific, but may be inflammatory in nature with presumed significant mediastinal/hilar adenopathy.    4.  Findings of mosaic perfusion in both lungs which is nonspecific, however this is most typically associated with changes of air trapping secondary to small airway inflammation.

## 2024-01-04 NOTE — H&P
[facility]    History and Physical - Hospitalist Service       Date of Admission:  1/3/2024    Assessment & Plan      Mary E Weiland is a 68 year old female admitted on 1/3/2024. She presents as a direct admission from [facility] with acute respiratory failure and hypoxia in the setting of chronic idiopathic pulmonary fibrosis requiring 5 L nasal cannula oxygen at rest even at baseline.  Found to have RSV and worsened hypoxia.    Acute on chronic respiratory failure with hypoxia: Tenuous pulmonary status at baseline on 5 L continuous oxygen at rest, 10 L with activity.  Now with respiratory syncytial virus.  -IV Solu-Medrol 125 mg every 8 hours  -BiPAP, wean as able  -Continuous oximetry  -Recommend RSV vaccination following recovery.  Discussed with patient  -Note plan for additional reevaluation of pulmonary hypertension and pulmonary fibrosis for transplant team at the [facility] including VQ scan, right heart cath.  This will need to wait until patient recovers.  -Confirmed DNR/DNI status; note that prior to seeking care, she actually thought she was worsening because of her pulmonary fibrosis and was requesting hospice enrollment.  Though she generally does not want to remain in the hospital, she is glad she came.  If clinical worsening or decompensation, might not be unreasonable to discuss a transition to comfort based care with the patient as this is generally her wish with her progressive lung disease  -Continue prior to admission Breo Ellipta 200-25  -Scheduled DuoNebs 4 times daily  -As needed albuterol nebulizer treatments available  -Given patient's tenuous pulmonary status at baseline, will give course of azithromycin for pleiotropic effects with acute viral illness (has demonstrated to reduce viral replication and some studies), coverage for atypical pneumonia, though lower suspicion of this.  -Low threshold for pulmonary consult if not  "improving.    Pulmonary hypertension:  Chronic lymphedema:  -Continue prior to admission  -IV furosemide 40 mg twice daily; home dose is 40 mg oral twice daily  -Compression stockings to bilateral lower extremities to mobilize peripheral fluid  -Plan to resume prior to admission Esbriet when dose is reconciled by pharmacy.  Discussed with patient, and her daughter plans to bring this medication in the morning as it is nonformulary.    Morbid obesity: BMI greater than 44.  Increased risk of all cause mortality.  Associated sleep apnea, type 2 diabetes, pulmonary hypertension.  -If patient is not being followed by bariatric clinic currently, might consider this as an outpatient if not already tied into transplant team.  Her obesity has been a factor precluding lung transplantation in the past.    Type 2 diabetes: Diet controlled. Most recent hemoglobin A1c 6.8 10/12/2023.  Anticipate worsening of blood glucose with high-dose steroids.    -Medium dose sliding scale insulin available.  Pending blood glucose trend, could consider daily long-acting insulin dose while on steroids.    GERD:  -Continue IV PPI          Diet:  N.p.o. until able to wean off BiPAP  DVT Prophylaxis: Enoxaparin (Lovenox) SQ  Hernandez Catheter: Not present  Lines: None     Cardiac Monitoring: None  Code Status:  DNR/DNI.  Confirmed with patient on admission    Clinically Significant Risk Factors Present on Admission                  # Hypertension: Noted on problem list     # DMII: A1C = 6.8 % (Ref range: <5.7 %) within past 6 months    # Severe Obesity: Estimated body mass index is 46.2 kg/m  as calculated from the following:    Height as of 12/6/23: 1.702 m (5' 7\").    Weight as of 12/6/23: 133.8 kg (295 lb).              Disposition Plan    anticipate a somewhat prolonged hospitalization given acute on chronic exacerbation of hypoxia and baseline tenuous pulmonary status.  Potentially 1/9/2024?       Mohan Atwood MD  Hospitalist Service  [facility]  Securely message with [employer] (more info)  Text page via [facility] Paging/Directory     ______________________________________________________________________    Chief Complaint   Shortness of breath, cough with sputum change    History is obtained from the patient, chart review    History of Present Illness   Mary E Weiland is a 68 year old female who presents as a direct admission from [facility] with acute hypoxic respiratory failure.    Patient has significant underlying lung disease with idiopathic pulmonary fibrosis, potentially related to chronic pneumonitis with reflux.  She has followed with pulmonary primarily through [facility], but has also been evaluated by [facility] and more recently pulmonary hypertension team at the [facility].  She did not qualify for lung transplant per [facility] because of her obesity, and has been referred again for additional evaluation and assessment by [facility] team as of December 4.    At baseline, patient requires 5 L nasal cannula O2 with rest, 10 L with activity.  She has not been vaccinated against RSV.    For the past few days, she has had a change in sputum and increased cough.  She thought perhaps this was worsening of her underlying pulmonary fibrosis, as she has had a significant decline in her respiratory status over the past year.  As she was having so much trouble breathing, she actually contacted her care team and requested a hospice consultation as she did not want to go to the hospital and wanted to die at home.  I believe her daughter encouraged her to present to the emergency department, coupled with the fact that her primary clinic was not able to have hospice immediately evaluate her, and this brought patient to the [facility] to be assessed.    Required CPAP with EMS, transition to BiPAP.  Transferred to [facility] given lack of IMC/ICU beds at [facility].    Since initiation on BiPAP,  patient is feeling much better.  She has no fevers or shaking chills, but tells me is not uncommon for her to feel alternatingly hot and cold, particularly at night.  Suggestive of hot flashes or night sweats potentially provoked by hypoxemia.  This is more longstanding.    Discussed recent follow-up with pulmonary and her general goals of care.  She is hesitant to undergo additional/repeat workup for transplant evaluation at the [facility], but will consider doing it this spring.  Encourage patient to consider this follow-up in February after recovery from current RSV illness if she at all has interest in transplant.  She recognizes her quality of life is poor and outside of transplant she has a progressive terminal illness.  Certainly her thoughts of hospice enrollment are not unreasonable, but with improvement on BiPAP, is happy she presented to the hospital.  Discussed duration of hospitalization, which may be prolonged given her baseline tenuous respiratory status.  Essentially will need improvement in inflammation and any structural changes from RSV in order to wean closer back to baseline home O2 needs, which are near maximal therapy with activity at 10 L.    With her RSV, she has diffuse wheezing.  Unable to appreciate crackles at this time despite her pulmonary fibrosis diagnosis.  Is possible that she will have substantial improvement with anti-inflammatories and nebulizer therapies if primary component of worsening dyspnea and hypoxia is from air trapping, as would be suggested on CT PE study at outside hospital.  Had only been using her albuterol inhaler once per day prior to hospitalization.        Past Medical History    Past Medical History:   Diagnosis Date    Hypercholesteraemia     Hypertension     NOVA (obstructive sleep apnea) 6/14/2021       Past Surgical History   Past Surgical History:   Procedure Laterality Date    D & C      DILATION AND CURETTAGE, HYSTEROSCOPY DIAGNOSTIC,  "COMBINED N/A 8/10/2015    Procedure: COMBINED DILATION AND CURETTAGE, HYSTEROSCOPY DIAGNOSTIC;  Surgeon: Scarlett Ridley MD;  Location:  OR       Prior to Admission Medications   Prior to Admission Medications   Prescriptions Last Dose Informant Patient Reported? Taking?   albuterol (PROAIR HFA/PROVENTIL HFA/VENTOLIN HFA) 108 (90 Base) MCG/ACT inhaler   Yes No   Sig: Inhale 2 puffs into the lungs every 6 hours   calcium-vitamin D (CALTRATE) 600-400 MG-UNIT per tablet   Yes No   Sig: Take 1 tablet by mouth 2 times daily   diltiazem ER COATED BEADS (CARDIZEM CD/CARTIA XT) 300 MG 24 hr capsule   No No   Sig: Take 1 capsule (300 mg) by mouth daily   fluticasone-vilanterol (BREO ELLIPTA) 200-25 MCG/INH inhaler   Yes No   Sig: Inhale 1 puff into the lungs daily   furosemide (LASIX) 40 MG tablet   No No   Sig: Take 1 tablet (40 mg) by mouth daily   Patient taking differently: Take 40 mg by mouth 2 times daily Patient taking 1 tablet twice a day   irbesartan (AVAPRO) 300 MG tablet   No No   Sig: TAKE 1 TABLET(300 MG) BY MOUTH DAILY   omeprazole (PRILOSEC) 40 MG DR capsule   Yes No   Sig: Take 1 capsule (40 mg) by mouth daily   pirfenidone (ESBRIET) 267 MG capsule   Yes No   Sig: First week: one twice daily, one capsule 3 times per day for day 7-14, 2 capsules 3 times per day on day 15-30, then 3 capsules 3 times per day after day 30.   potassium chloride ER (K-TAB) 20 MEQ CR tablet   No No   Sig: Take 1 tablet (20 mEq) by mouth daily   Patient taking differently: Take 20 mEq by mouth 2 times daily Patient taking one tablet twice daily      Facility-Administered Medications: None           Physical Exam   Vital signs:  Temp: 98.5  F (36.9  C) Temp src: Axillary BP: 118/78 Pulse: 94   Resp: 19 SpO2: 95 % O2 Device: BiPAP/CPAP   Height: 170.2 cm (5' 7\") Weight: 129.8 kg (286 lb 2.5 oz)  Estimated body mass index is 44.82 kg/m  as calculated from the following:    Height as of this encounter: 1.702 m (5' 7\").    Weight " as of this encounter: 129.8 kg (286 lb 2.5 oz).      General Appearance: Obese 68-year-old female resting on hospital bed.  On BiPAP.  Does not appear toxic.  Eyes: No scleral icterus or injection  HEENT: Atraumatic.  Normocephalic  Respiratory: Diffuse wheezing throughout lung fields on BiPAP.  No appreciable crackles or rhonchi.  Tachypnea, though reports that her respiratory rate feels baseline for her in the setting of pulmonary fibrosis.  Cardiovascular: Mild tachycardia in the 100 range.  No appreciable murmur.  GI: Abdomen obese, soft, nontender to palpation.  No palpable mass.  Lymph/Hematologic: 1+ lower extremity edema bilateral lower extremities  Genitourinary: Not examined other than some straw yellow urine in bedside collection canister  Neurologic: Alert and conversant.  Appropriate in conversation.  Mental status is grossly intact.  Psychiatric: Very pleasant, normal affect    Medical Decision Making       80 MINUTES SPENT BY ME on the date of service doing chart review, history, exam, documentation & further activities per the note.      Data     I have personally reviewed the following data over the past 24 hrs:    10.1  \   14.8; 15.3   / 165     137; 138 98; 99 19.4; 21 /  206 (H)   4.3; 4.3 25 0.70 \     ALT: 34 AST: 41 AP: 122 TBILI: 0.5   ALB: 4.5 TOT PROTEIN: 8.5 (H) LIPASE: N/A     Trop: 20 (H) BNP: 222     TSH: 2.99 T4: N/A A1C: N/A     Procal: N/A CRP: N/A Lactic Acid: 1.0       INR:  N/A PTT:  N/A   D-dimer:  1.29 (H) Fibrinogen:  N/A       Imaging results reviewed over the past 24 hrs:   Recent Results (from the past 24 hour(s))   XR Chest Port 1 View    Narrative    XR CHEST PORT 1 VIEW 1/3/2024 3:00 PM    HISTORY: sob    COMPARISON: 6/27/2023        Impression    IMPRESSION: Multifocal interstitial and patchy opacities in the lungs  likely due to pulmonary edema or atypical pneumonia. No pleural  effusion or pneumothorax. Mild cardiomegaly.    KEESHA TRISTAN MD         SYSTEM ID:   [id number]   CT Chest Pulmonary Embolism w Contrast    Narrative    EXAM: CT CHEST PULMONARY EMBOLISM W CONTRAST  LOCATION: [facility]  DATE: 1/3/2024    INDICATION: Dyspnea, hypoxia.  COMPARISON: PET CT 06/27/2023.  TECHNIQUE: CT chest pulmonary angiogram during arterial phase injection of IV contrast. Multiplanar reformats and MIP reconstructions were performed. Dose reduction techniques were used.   CONTRAST: 77 mL Isovue 370.    FINDINGS:  ANGIOGRAM CHEST: The central pulmonary artery is enlarged measuring up to 3.9 cm in greatest radial dimension which is nonspecific, but most typical for pulmonary arterial hypertension. Thoracic aorta is negative for dissection. No CT evidence of right   heart strain. No evidence for pulmonary emboli.    LUNGS AND PLEURA: Similar findings to prior PET CT 06/27/2023 with again seen advanced intermixed interstitial and groundglass infiltrates seen in both lungs with some areas of localized sparing. Mild findings of mosaic perfusion which is nonspecific,   but most typical for air trapping commonly associated with small airway inflammation.    MEDIASTINUM/AXILLAE: Advanced mediastinal/hilar adenopathy which has mildly increased since 06/25/2023. This is presumed to be reactive in nature given the lung infiltrates. Small esophageal hiatal hernia.    CORONARY ARTERY CALCIFICATION: No significant.    UPPER ABDOMEN: Normal.    MUSCULOSKELETAL: Mild to moderate scattered hypertrophic changes most marked in the mid/lower portions of the thoracic spine.      Impression    IMPRESSION:  1.  No PE or dissection.    2.  3.9 cm in greatest radial dimension enlargement of the main pulmonary artery which can be associated with pulmonary arterial hypertension.    3.  Advanced scattered bilateral groundglass and interstitial lung infiltrates which are nonspecific, but may be inflammatory in nature with presumed significant mediastinal/hilar adenopathy.    4.  Findings of  mosaic perfusion in both lungs which is nonspecific, however this is most typically associated with changes of air trapping secondary to small airway inflammation.

## 2024-01-04 NOTE — PROGRESS NOTES
Pt was on V60 with AVAPS settings throughout most of the day due to pt sleeping and resting most of the day. PIP's around 14 to achieve targeted Vt of 500 ml. Pt was switched to 6L NC around 1500 with SpO2 in the low 90's. At baseline pt wears 5L at rest and 10L with activity. Pt reports no increased WOB off of AVAPS at this time. Skin intact under oxygen device. V60 on SB.   Will cont to monitor.   1/4/2024  Chandrika Lindsey, RT

## 2024-01-05 LAB
ANION GAP SERPL CALCULATED.3IONS-SCNC: 8 MMOL/L (ref 7–15)
BASOPHILS # BLD AUTO: 0 10E3/UL (ref 0–0.2)
BASOPHILS NFR BLD AUTO: 0 %
BUN SERPL-MCNC: 35.6 MG/DL (ref 8–23)
CALCIUM SERPL-MCNC: 9.4 MG/DL (ref 8.8–10.2)
CHLORIDE SERPL-SCNC: 102 MMOL/L (ref 98–107)
CREAT SERPL-MCNC: 0.78 MG/DL (ref 0.51–0.95)
DEPRECATED HCO3 PLAS-SCNC: 34 MMOL/L (ref 22–29)
EGFRCR SERPLBLD CKD-EPI 2021: 82 ML/MIN/1.73M2
EOSINOPHIL # BLD AUTO: 0 10E3/UL (ref 0–0.7)
EOSINOPHIL NFR BLD AUTO: 0 %
ERYTHROCYTE [DISTWIDTH] IN BLOOD BY AUTOMATED COUNT: 13.1 % (ref 10–15)
GLUCOSE BLDC GLUCOMTR-MCNC: 124 MG/DL (ref 70–99)
GLUCOSE BLDC GLUCOMTR-MCNC: 125 MG/DL (ref 70–99)
GLUCOSE BLDC GLUCOMTR-MCNC: 151 MG/DL (ref 70–99)
GLUCOSE BLDC GLUCOMTR-MCNC: 155 MG/DL (ref 70–99)
GLUCOSE BLDC GLUCOMTR-MCNC: 181 MG/DL (ref 70–99)
GLUCOSE BLDC GLUCOMTR-MCNC: 216 MG/DL (ref 70–99)
GLUCOSE SERPL-MCNC: 117 MG/DL (ref 70–99)
HCT VFR BLD AUTO: 41.1 % (ref 35–47)
HGB BLD-MCNC: 13.4 G/DL (ref 11.7–15.7)
IMM GRANULOCYTES # BLD: 0.1 10E3/UL
IMM GRANULOCYTES NFR BLD: 0 %
LYMPHOCYTES # BLD AUTO: 1.6 10E3/UL (ref 0.8–5.3)
LYMPHOCYTES NFR BLD AUTO: 14 %
MAGNESIUM SERPL-MCNC: 2.5 MG/DL (ref 1.7–2.3)
MCH RBC QN AUTO: 31 PG (ref 26.5–33)
MCHC RBC AUTO-ENTMCNC: 32.6 G/DL (ref 31.5–36.5)
MCV RBC AUTO: 95 FL (ref 78–100)
MONOCYTES # BLD AUTO: 1 10E3/UL (ref 0–1.3)
MONOCYTES NFR BLD AUTO: 9 %
NEUTROPHILS # BLD AUTO: 8.7 10E3/UL (ref 1.6–8.3)
NEUTROPHILS NFR BLD AUTO: 77 %
NRBC # BLD AUTO: 0 10E3/UL
NRBC BLD AUTO-RTO: 0 /100
NT-PROBNP SERPL-MCNC: 140 PG/ML (ref 0–900)
PLATELET # BLD AUTO: 183 10E3/UL (ref 150–450)
POTASSIUM SERPL-SCNC: 3.9 MMOL/L (ref 3.4–5.3)
PROCALCITONIN SERPL IA-MCNC: 0.1 NG/ML
RBC # BLD AUTO: 4.32 10E6/UL (ref 3.8–5.2)
SODIUM SERPL-SCNC: 144 MMOL/L (ref 135–145)
WBC # BLD AUTO: 11.4 10E3/UL (ref 4–11)

## 2024-01-05 PROCEDURE — 999N000157 HC STATISTIC RCP TIME EA 10 MIN

## 2024-01-05 PROCEDURE — 94660 CPAP INITIATION&MGMT: CPT

## 2024-01-05 PROCEDURE — 99233 SBSQ HOSP IP/OBS HIGH 50: CPT | Performed by: HOSPITALIST

## 2024-01-05 PROCEDURE — 94640 AIRWAY INHALATION TREATMENT: CPT | Mod: 76

## 2024-01-05 PROCEDURE — 84145 PROCALCITONIN (PCT): CPT | Performed by: HOSPITALIST

## 2024-01-05 PROCEDURE — 85025 COMPLETE CBC W/AUTO DIFF WBC: CPT | Performed by: HOSPITALIST

## 2024-01-05 PROCEDURE — 120N000013 HC R&B IMCU

## 2024-01-05 PROCEDURE — 83735 ASSAY OF MAGNESIUM: CPT | Performed by: STUDENT IN AN ORGANIZED HEALTH CARE EDUCATION/TRAINING PROGRAM

## 2024-01-05 PROCEDURE — 250N000013 HC RX MED GY IP 250 OP 250 PS 637: Performed by: INTERNAL MEDICINE

## 2024-01-05 PROCEDURE — 250N000009 HC RX 250: Performed by: INTERNAL MEDICINE

## 2024-01-05 PROCEDURE — 80048 BASIC METABOLIC PNL TOTAL CA: CPT | Performed by: HOSPITALIST

## 2024-01-05 PROCEDURE — 94640 AIRWAY INHALATION TREATMENT: CPT

## 2024-01-05 PROCEDURE — C9113 INJ PANTOPRAZOLE SODIUM, VIA: HCPCS | Performed by: INTERNAL MEDICINE

## 2024-01-05 PROCEDURE — 36415 COLL VENOUS BLD VENIPUNCTURE: CPT | Performed by: STUDENT IN AN ORGANIZED HEALTH CARE EDUCATION/TRAINING PROGRAM

## 2024-01-05 PROCEDURE — 83880 ASSAY OF NATRIURETIC PEPTIDE: CPT | Performed by: HOSPITALIST

## 2024-01-05 PROCEDURE — 250N000011 HC RX IP 250 OP 636: Performed by: INTERNAL MEDICINE

## 2024-01-05 PROCEDURE — 250N000011 HC RX IP 250 OP 636: Performed by: HOSPITALIST

## 2024-01-05 RX ORDER — PREDNISONE 20 MG/1
40 TABLET ORAL DAILY
Status: DISCONTINUED | OUTPATIENT
Start: 2024-01-06 | End: 2024-01-07

## 2024-01-05 RX ADMIN — FLUTICASONE FUROATE AND VILANTEROL TRIFENATATE 1 PUFF: 200; 25 POWDER RESPIRATORY (INHALATION) at 09:31

## 2024-01-05 RX ADMIN — PANTOPRAZOLE SODIUM 40 MG: 40 INJECTION, POWDER, FOR SOLUTION INTRAVENOUS at 20:30

## 2024-01-05 RX ADMIN — ENOXAPARIN SODIUM 40 MG: 40 INJECTION SUBCUTANEOUS at 11:31

## 2024-01-05 RX ADMIN — INSULIN ASPART 1 UNITS: 100 INJECTION, SOLUTION INTRAVENOUS; SUBCUTANEOUS at 00:21

## 2024-01-05 RX ADMIN — IPRATROPIUM BROMIDE AND ALBUTEROL SULFATE 3 ML: .5; 3 SOLUTION RESPIRATORY (INHALATION) at 08:03

## 2024-01-05 RX ADMIN — FUROSEMIDE 40 MG: 10 INJECTION, SOLUTION INTRAMUSCULAR; INTRAVENOUS at 08:22

## 2024-01-05 RX ADMIN — IRBESARTAN 300 MG: 150 TABLET ORAL at 09:31

## 2024-01-05 RX ADMIN — IPRATROPIUM BROMIDE AND ALBUTEROL SULFATE 3 ML: .5; 3 SOLUTION RESPIRATORY (INHALATION) at 11:19

## 2024-01-05 RX ADMIN — PANTOPRAZOLE SODIUM 40 MG: 40 INJECTION, POWDER, FOR SOLUTION INTRAVENOUS at 08:22

## 2024-01-05 RX ADMIN — IPRATROPIUM BROMIDE AND ALBUTEROL SULFATE 3 ML: .5; 3 SOLUTION RESPIRATORY (INHALATION) at 19:14

## 2024-01-05 RX ADMIN — IPRATROPIUM BROMIDE AND ALBUTEROL SULFATE 3 ML: .5; 3 SOLUTION RESPIRATORY (INHALATION) at 15:44

## 2024-01-05 RX ADMIN — DILTIAZEM HYDROCHLORIDE 300 MG: 300 CAPSULE, EXTENDED RELEASE ORAL at 09:31

## 2024-01-05 RX ADMIN — ENOXAPARIN SODIUM 40 MG: 40 INJECTION SUBCUTANEOUS at 00:21

## 2024-01-05 RX ADMIN — INSULIN ASPART 1 UNITS: 100 INJECTION, SOLUTION INTRAVENOUS; SUBCUTANEOUS at 16:32

## 2024-01-05 RX ADMIN — INSULIN ASPART 1 UNITS: 100 INJECTION, SOLUTION INTRAVENOUS; SUBCUTANEOUS at 20:30

## 2024-01-05 RX ADMIN — METHYLPREDNISOLONE SODIUM SUCCINATE 40 MG: 40 INJECTION, POWDER, FOR SOLUTION INTRAMUSCULAR; INTRAVENOUS at 08:22

## 2024-01-05 RX ADMIN — AZITHROMYCIN MONOHYDRATE 500 MG: 500 INJECTION, POWDER, LYOPHILIZED, FOR SOLUTION INTRAVENOUS at 02:37

## 2024-01-05 RX ADMIN — INSULIN ASPART 2 UNITS: 100 INJECTION, SOLUTION INTRAVENOUS; SUBCUTANEOUS at 11:33

## 2024-01-05 ASSESSMENT — ACTIVITIES OF DAILY LIVING (ADL)
ADLS_ACUITY_SCORE: 48
ADLS_ACUITY_SCORE: 52
ADLS_ACUITY_SCORE: 48
ADLS_ACUITY_SCORE: 52
ADLS_ACUITY_SCORE: 46
ADLS_ACUITY_SCORE: 48
ADLS_ACUITY_SCORE: 52
ADLS_ACUITY_SCORE: 48

## 2024-01-05 NOTE — PROVIDER NOTIFICATION
01/05/24 0240   Mode: CPAP/ BiPAP/ AVAPS/ AVAPS AE   CPAP/BiPAP/ AVAPS/ AVAPS AE Mode AVAPS   Equipment   Device V60   CPAP/BiPAP/Settings   $CPAP/BiPAP Subsequent completed   BIPAP/CPAP On Standby On   Oxygen (%) 30   AVAPS Settings   Min P (cmH20) AVAPS 10   Max P (cmH20) AVAPS 30   EPAP cmH20 Bilevel (Res Med) 8   Target VT (mL) AVAPS 500   Set Rate (breath/min) 16 breath/min       Pt remains on AVAPS overnight.    Hue Be RT

## 2024-01-05 NOTE — PROGRESS NOTES
[facility]    Medicine Progress Note - Hospitalist Service    Date of Admission:  1/3/2024    Assessment & Plan   Acute on chronic hypoxic and hypercapnic respiratory failure  (+) respiratory syncytial virus (RSV) infection 1/3/24, abnormal CT chest  Pulmonary hypertension, history of chronic cor pulmonale  Idiopathic pulmonary fibrosis  History of obstructive sleep apnea, at bedtime BIPAP use  Chronic respiratory failure, on home oxygen  Chronic lymphedema  Morbid obesity, recent Body mass index is 44.82 kg/m .  Type II diabetes mellitus, A1c 6.8% 10/12/23  Stress-induced hyperglycemia due to infection and corticosteroids  Essential hypertension  Gastroesophageal reflux disease (GERD)  Osteopenia  DNR, DNI code status  DVT prophylaxis  Weakness and deconditioning   Goals of care, palliative care consult 1/4/24  Disposition    Mary E Weiland is a 68 year old female with past medical history above admitted 1/3/2024 with shortness of breath, cough, acute on chronic respiratory failure, and (+) RSV.  Complex medical history including chronic cor pulmonale, pulmonary hypertension, idiopathic pulmonary fibrosis, obstructive sleep apnea, and morbid obesity.  Additional past medical history outlined above.  Pulmonary consulted.  Admission to [facility] with initiation of BiPAP.    Consults - Pulmonary, Palliative Care    Complex patient with idiopathic pulmonary fibrosis, sleep apnea, chronic cor pulmonale and pulmonary hypertension with new RSV infection.  Pulmonary consult follow-up.  Wean BiPAP during the day as tolerated, continue BiPAP at night.  Supportive care for RSV infection.  Continue IV corticosteroids per direction of pulmonary medicine.  Continue azithromycin.  Continue IV Lasix diuresis, 40 mg daily.  On oral Lasix prior to admission, pulmonary service to please review.  Morning labs as ordered.  Continue respiratory isolation per protocol for acute RSV infection.  Palliative care consult  1/4/2024 with goals of care discussed.  Restorative cares at this time with follow-up after the weekend.  DNR, DNI CODE STATUS previously confirmed and will be respected.  Significant comorbidities including pulmonary fibrosis, pulmonary hypertension, chronic cor pulmonale, and new RSV respiratory infection with acute on chronic respiratory failure.  Clinical improvement in respiratory status 1/5/24 with ongoing supportive cares.  Physical and Occupational Therapy consulted for weakness and deconditioning.  Increase activity slowly as tolerated and as respiratory status permits.  Diet advanced to regular provided not currently on BiPAP.  Monitor closely.  Monitor sleep apnea in the setting of acute on chronic respiratory failure.  BiPAP at night as ordered and during the day as needed.  Monitor nocturnal oxygen saturations.  Encourage incentive spirometry as able.  History chronic lymphedema, monitor.  Continue conservative medical management.  Morbid obesity, long-term weight loss appropriate.  Lifestyle modifications as able, follow-up with primary clinic provider.  Monitor blood sugars for diabetes mellitus, type II.  A1c 6.8%.  Stress-induced hyperglycemia, exacerbated by corticosteroids.  Diabetic diet.    Monitor blood pressure, recently under satisfactory control.  Prior to admission on extended release diltiazem, irbesartan, with daily furosemide.  Continue extended release diltiazem and irbesartan and monitor.  IV furosemide ordered on admission,  reassess daily.  Gastroesophageal reflux history, continue IV Protonix, especially with concurrent corticosteroid use.  Monitor symptoms.  Enoxaparin (Lovenox) for DVT prophylaxis measures.  DNR, DNI CODE STATUS confirmed on admission and on 1/4/2024.  Palliative care consulted on 1/4/2024 to review goals of care, see consult note.  Restorative cares at this time with follow-up by palliative care next week.  Patient discussed with palliative care team.  Social  work consulted for discharge planning.  Estimated length of stay several more days pending clinical course and subspecialty recommendations.  Family updated at bedside 1/5/24 including her son, daughter, and .  Appreciative of cares provided.    Results  CT Chest 1/3/24  1.  No PE or dissection.  2.  3.9 cm in greatest radial dimension enlargement of the main pulmonary artery which can be associated with pulmonary arterial hypertension.  3.  Advanced scattered bilateral groundglass and interstitial lung infiltrates which are nonspecific, but may be inflammatory in nature with presumed significant mediastinal/hilar adenopathy.  4.  Findings of mosaic perfusion in both lungs which is nonspecific, however this is most typically associated with changes of air trapping secondary to small airway inflammation    Chest x-ray 1/4/24  IMPRESSION: Multifocal interstitial and patchy opacities in the lungs  likely due to pulmonary edema or atypical pneumonia. No pleural  effusion or pneumothorax. Mild cardiomegaly.    ECHO 8/9/23  Contrast not used due to no IV access.  Left ventricular systolic function is normal.  The visual ejection fraction is 60-65%.  Regional wall motion abnormalities cannot be excluded due to limited  visualization.  RV systolic function probably normal although accuracy limited in absence of  IV contrast use.  Consider CMR for evaluation of cardiac chambers and function if clinically  appropriate. The study was technically difficult. Compared to the prior study  dated 3/21, there have been no changes.          Diet: Regular Diet Adult    DVT Prophylaxis: Enoxaparin (Lovenox) SQ  Hernandez Catheter: Not present  Lines: None     Cardiac Monitoring: ACTIVE order. Indication: respiratory failure  Code Status: No CPR- Do NOT Intubate      Clinically Significant Risk Factors                  # Hypertension: Noted on problem list       # DMII: A1C = 6.8 % (Ref range: <5.7 %) within past 6 months, PRESENT ON  "ADMISSION  # Severe Obesity: Estimated body mass index is 44.82 kg/m  as calculated from the following:    Height as of this encounter: 1.702 m (5' 7\").    Weight as of this encounter: 129.8 kg (286 lb 2.5 oz)., PRESENT ON ADMISSION            Disposition Plan      Expected Discharge Date: 01/06/2024,  3:00 PM      Discharge Comments: 1/6 bipap            Jacoby Irene MD  Hospitalist Service  [facility]  Securely message with [employer] (more info)  Text page via [facility] Paging/Directory   ______________________________________________________________________    Interval History   First visit with patient today, lying in bed, family at the bedside including her daughter, son, and .  Admitted overnight for worsening respiratory failure.  Feels short of breath despite BiPAP.  No reported chest pain.  Pulmonary consult requested in addition to patient/family request for palliative care consult.    Physical Exam   Vital Signs: Temp: 97.9  F (36.6  C) Temp src: Oral BP: 117/62 Pulse: 87   Resp: 23 SpO2: 91 % O2 Device: Nasal cannula Oxygen Delivery: 6 LPM  Weight: 286 lbs 2.51 oz    GENERAL awake and alert, sitting up in bed, in mild to moderate respiratory distress, on BiPAP, pleasant and interactive, family present  LUNGS no wheezes or crackles, mild to moderate inspiratory effort  HEART S1, S2 with RRR  ABDOMEN soft, nontender  MUSCULOSKELETAL extremities with   SKIN warm and dry  NEURO moves upper and lower extremities spontaneously and to command  MENTAL STATUS answering questions and following simple commands    Medical Decision Making             Data     I have personally reviewed the following data over the past 24 hrs:    11.4 (H)  \   13.4   / 183     144 102 35.6 (H) /  216 (H)   3.9 34 (H) 0.78 \     Trop: N/A BNP: 140     Procal: 0.10 CRP: N/A Lactic Acid: N/A         Imaging results reviewed over the past 24 hrs:   No results found for this or any previous visit (from the " past 24 hour(s)).

## 2024-01-05 NOTE — PLAN OF CARE
Goal Outcome Evaluation: Pt placed on Bipap at 2020 last evening per RT; tolerating well at 40 % FI02. Minimal UOP via purewick. BGM's covered per parameters. No need for Ativan, pt calm all shift, dtr spent night in recliner

## 2024-01-05 NOTE — PLAN OF CARE
Status: Pt admitted for SOB and increased coughing, found to have RSV - PMHx of pulmonary fibrosis.  Vitals: Tachy in the 110s, on 6L NC - sats in the low 90s, BP WNL  Neuros: AOx4. Deaf in R ear.  IV: PIV SL in R hand  Labs/Electrolytes: BS ACHS  Resp/trach: LS with wheezes, diminished in LL, patient has shallow breaths, c/o SOB at times, RR up to 30-32/min  Diet: NPO  Bowel status: No BM this shift, continent  : Purewick in place  Skin: Blanchable redness to coccyx and sacrum as well as groin area.  Pain: Denies  Activity: Not OOB this shift, was IND at home.  Social: Family at bedside the entire day, okay for one to stay the night to help with patient anxiety.  Plan: Continue to monitor patient's O2 needs, otherwise follow POC.

## 2024-01-05 NOTE — PLAN OF CARE
Goal Outcome Evaluation:    Orientations: AOx4  Vitals/Pain: VSS ex tachy 110s, 6L NC, dyspnea with exertion, BIPAP at night, denies pain   Tele: SR/ST  Lines/Drains: PIV SL   Skin/Wounds: Blanchable redness to coccyx and groin area  GI/: Purewick in place UOA, no BM  Labs: Abnormal/Trends, Electrolyte Replacement- Mag and phos recheck in AM, BG Q4  Ambulation/Assist: Not OOB, turn and repo  Plan: Continue plan of care

## 2024-01-05 NOTE — PROGRESS NOTES
"PULMONOLOGY PROGRESS NOTE    Date of Admission: 1/3/2024    CC/Reason for Hospital visit: Hypoxemia, ILD   __________________________________________    ASSESSMENT / PLAN      Pulmonary Diagnoses:  Abnormal Chest Imaging R91.8  Hypoxemia R09.02  Morbid obesity E66.01  Pulmonary fibrosis unspecified J84.10  Shortness of Breath R06.02    ASSESSMENT:  66 yo female with PMH DPLD thought likely fibrotic HP on nintedanib, chronic hypoxemia on home oxygen, pulmonary HTN, morbid obesity, chronic lymphedema, GERD who presented with SOB and found to have RSV.     Hypoxemia in setting of RSV infection and chronic, ambulatory oxygen dependent, DPLD. Her imaging appears similar compared to prior. Unfortunately, there is not specific treatment for RSV beyond supportive care. Given her clear GOC, agree with steroids for now, would decrease to 40mg daily. I don't know of any clinical study showing benefit of azithromycin for viral infections and this is generally not recommended from an antibiotic stewardship standpoint.     Her oxygenation/respiratory status is improved today. Able to wear NC oxygen all day.     PLAN:   Transition to prednisone 40mg daily  Prednisone taper: 40mg x3 days, 30mg x3 days, 20mg x3 days, 10mg x3 days, 5mg x3 days  Azithromycin per primary  Diuresis per primary  Duonebs PRN    Will follow peripherally, please page with questions.    Cj Valdivia MD  Interventional Pulmonology  [facility]      ____________________________________________    SUBJECTIVE      Feeling better today. Spent day off BiPAP.    ROS: A Problem Pertinent review of systems was negative except for items noted in HPI.  Past Medical, Family, and Social/Substance History has been reviewed: No interval changes.  OBJECTIVE   Vital signs:  Temp: 98.7  F (37.1  C) Temp src: Oral BP: 112/75 Pulse: 80   Resp: (!) 31 SpO2: 95 % O2 Device: Nasal cannula Oxygen Delivery: 6 LPM Height: 170.2 cm (5' 7\") Weight: 129.8 kg (286 lb 2.5 " "oz)  Estimated body mass index is 44.82 kg/m  as calculated from the following:    Height as of this encounter: 1.702 m (5' 7\").    Weight as of this encounter: 129.8 kg (286 lb 2.5 oz).        I/O last 3 completed shifts:  In: 0   Out: 1200 [Urine:1200]      CONSTITUTIONAL/GENERAL APPEARANCE: Alert. No Apparent Distress.  PSYCHIATRIC: Pleasant and appropriate mood and affect. Oriented x 3.  EARS, NOSE,THROAT,MOUTH: External ears and nose overall normal. Normal oral mucosa.   NECK: Neck appearance normal. No neck masses and the thyroid is not enlarged.   RESPIRATORY: Non-labored effort.   CARDIOVASCULAR: Extremities with no edema.      LABORATORY ASSESSMENT    Arterial Blood Gas  Recent Labs   Lab 01/04/24  0519 01/03/24  2337   O2PER 50 40     CBC  Recent Labs   Lab 01/05/24  0625 01/04/24  0519 01/03/24  1448   WBC 11.4* 10.2 10.1   RBC 4.32 4.29 4.70   HGB 13.4 13.3 14.8  15.3   HCT 41.1 40.4 44.0  45   MCV 95 94 94   MCH 31.0 31.0 31.5   MCHC 32.6 32.9 33.6   RDW 13.1 13.0 13.0    170 165     BMP  Recent Labs   Lab 01/05/24  1552 01/05/24  1132 01/05/24  0803 01/05/24  0625 01/04/24  0557 01/04/24  0519 01/04/24  0009 01/03/24  2337 01/03/24  1448   NA  --   --   --  144  --  138  --   --  137  138   POTASSIUM  --   --   --  3.9  --  4.0  --   --  4.3  4.3   CHLORIDE  --   --   --  102  --  99  --   --  99  98   GEOVANNY  --   --   --  9.4  --  9.2  --   --  9.1   CO2  --   --   --  34*  --  30*  --   --  25   BUN  --   --   --  35.6*  --  22.0  --   --  21  19.4   CR  --   --   --  0.78  --  0.70  --  0.70 0.75  0.7   * 216* 124* 117*   < > 202*   < >  --  141*  142*    < > = values in this interval not displayed.     INRNo lab results found in last 7 days.   BNPNo lab results found in last 7 days.  VENOUS BLOOD GASES  Recent Labs   Lab 01/04/24  0519 01/03/24  2337 01/03/24  1541 01/03/24  1448   PHV 7.34 7.38 7.34 7.40   PCO2V 58* 50 47 49   PO2V 85* 68* 36 25   HCO3V 31* 30* 26 30*   XAVIER " 3.9* 3.8*  --   --          Additional labs and/or comments:     IMAGING    No results found for this visit on 01/03/24.    PFT & OTHER TESTING          No data to display

## 2024-01-06 ENCOUNTER — APPOINTMENT (OUTPATIENT)
Dept: PHYSICAL THERAPY | Facility: CLINIC | Age: 69
DRG: 189 | End: 2024-01-06
Attending: HOSPITALIST
Payer: COMMERCIAL

## 2024-01-06 ENCOUNTER — APPOINTMENT (OUTPATIENT)
Dept: OCCUPATIONAL THERAPY | Facility: CLINIC | Age: 69
DRG: 189 | End: 2024-01-06
Attending: HOSPITALIST
Payer: COMMERCIAL

## 2024-01-06 LAB
CREAT SERPL-MCNC: 0.66 MG/DL (ref 0.51–0.95)
EGFRCR SERPLBLD CKD-EPI 2021: >90 ML/MIN/1.73M2
GLUCOSE BLDC GLUCOMTR-MCNC: 103 MG/DL (ref 70–99)
GLUCOSE BLDC GLUCOMTR-MCNC: 107 MG/DL (ref 70–99)
GLUCOSE BLDC GLUCOMTR-MCNC: 120 MG/DL (ref 70–99)
GLUCOSE BLDC GLUCOMTR-MCNC: 164 MG/DL (ref 70–99)
GLUCOSE BLDC GLUCOMTR-MCNC: 174 MG/DL (ref 70–99)
GLUCOSE BLDC GLUCOMTR-MCNC: 190 MG/DL (ref 70–99)
MAGNESIUM SERPL-MCNC: 2.4 MG/DL (ref 1.7–2.3)
PLATELET # BLD AUTO: 151 10E3/UL (ref 150–450)
POTASSIUM SERPL-SCNC: 3.5 MMOL/L (ref 3.4–5.3)

## 2024-01-06 PROCEDURE — 120N000013 HC R&B IMCU

## 2024-01-06 PROCEDURE — 94660 CPAP INITIATION&MGMT: CPT

## 2024-01-06 PROCEDURE — 999N000157 HC STATISTIC RCP TIME EA 10 MIN

## 2024-01-06 PROCEDURE — 250N000011 HC RX IP 250 OP 636: Performed by: HOSPITALIST

## 2024-01-06 PROCEDURE — 250N000013 HC RX MED GY IP 250 OP 250 PS 637: Performed by: HOSPITALIST

## 2024-01-06 PROCEDURE — 99233 SBSQ HOSP IP/OBS HIGH 50: CPT | Performed by: HOSPITALIST

## 2024-01-06 PROCEDURE — 94640 AIRWAY INHALATION TREATMENT: CPT | Mod: 76

## 2024-01-06 PROCEDURE — 250N000011 HC RX IP 250 OP 636: Performed by: INTERNAL MEDICINE

## 2024-01-06 PROCEDURE — 82565 ASSAY OF CREATININE: CPT | Performed by: INTERNAL MEDICINE

## 2024-01-06 PROCEDURE — 250N000012 HC RX MED GY IP 250 OP 636 PS 637: Performed by: INTERNAL MEDICINE

## 2024-01-06 PROCEDURE — 83735 ASSAY OF MAGNESIUM: CPT | Performed by: HOSPITALIST

## 2024-01-06 PROCEDURE — 36415 COLL VENOUS BLD VENIPUNCTURE: CPT | Performed by: INTERNAL MEDICINE

## 2024-01-06 PROCEDURE — 97530 THERAPEUTIC ACTIVITIES: CPT | Mod: GO

## 2024-01-06 PROCEDURE — 250N000013 HC RX MED GY IP 250 OP 250 PS 637: Performed by: INTERNAL MEDICINE

## 2024-01-06 PROCEDURE — 250N000009 HC RX 250: Performed by: INTERNAL MEDICINE

## 2024-01-06 PROCEDURE — 5A09357 ASSISTANCE WITH RESPIRATORY VENTILATION, LESS THAN 24 CONSECUTIVE HOURS, CONTINUOUS POSITIVE AIRWAY PRESSURE: ICD-10-PCS | Performed by: INTERNAL MEDICINE

## 2024-01-06 PROCEDURE — C9113 INJ PANTOPRAZOLE SODIUM, VIA: HCPCS | Performed by: INTERNAL MEDICINE

## 2024-01-06 PROCEDURE — 97140 MANUAL THERAPY 1/> REGIONS: CPT | Mod: GP

## 2024-01-06 PROCEDURE — 97161 PT EVAL LOW COMPLEX 20 MIN: CPT | Mod: GP

## 2024-01-06 PROCEDURE — 97166 OT EVAL MOD COMPLEX 45 MIN: CPT | Mod: GO

## 2024-01-06 PROCEDURE — 94640 AIRWAY INHALATION TREATMENT: CPT

## 2024-01-06 PROCEDURE — 97530 THERAPEUTIC ACTIVITIES: CPT | Mod: GP

## 2024-01-06 PROCEDURE — 85049 AUTOMATED PLATELET COUNT: CPT | Performed by: INTERNAL MEDICINE

## 2024-01-06 PROCEDURE — 84132 ASSAY OF SERUM POTASSIUM: CPT | Performed by: HOSPITALIST

## 2024-01-06 RX ORDER — FUROSEMIDE 40 MG
40 TABLET ORAL DAILY
Status: DISCONTINUED | OUTPATIENT
Start: 2024-01-07 | End: 2024-01-09 | Stop reason: HOSPADM

## 2024-01-06 RX ORDER — PANTOPRAZOLE SODIUM 40 MG/1
40 TABLET, DELAYED RELEASE ORAL
Status: DISCONTINUED | OUTPATIENT
Start: 2024-01-06 | End: 2024-01-09 | Stop reason: HOSPADM

## 2024-01-06 RX ADMIN — PREDNISONE 40 MG: 20 TABLET ORAL at 09:06

## 2024-01-06 RX ADMIN — IPRATROPIUM BROMIDE AND ALBUTEROL SULFATE 3 ML: .5; 3 SOLUTION RESPIRATORY (INHALATION) at 12:15

## 2024-01-06 RX ADMIN — FUROSEMIDE 40 MG: 10 INJECTION, SOLUTION INTRAMUSCULAR; INTRAVENOUS at 09:05

## 2024-01-06 RX ADMIN — DILTIAZEM HYDROCHLORIDE 300 MG: 300 CAPSULE, EXTENDED RELEASE ORAL at 09:06

## 2024-01-06 RX ADMIN — PANTOPRAZOLE SODIUM 40 MG: 40 TABLET, DELAYED RELEASE ORAL at 15:45

## 2024-01-06 RX ADMIN — FLUTICASONE FUROATE AND VILANTEROL TRIFENATATE 1 PUFF: 200; 25 POWDER RESPIRATORY (INHALATION) at 09:17

## 2024-01-06 RX ADMIN — ENOXAPARIN SODIUM 40 MG: 40 INJECTION SUBCUTANEOUS at 00:17

## 2024-01-06 RX ADMIN — DOCUSATE SODIUM 50 MG AND SENNOSIDES 8.6 MG 2 TABLET: 8.6; 5 TABLET, FILM COATED ORAL at 09:06

## 2024-01-06 RX ADMIN — IRBESARTAN 300 MG: 150 TABLET ORAL at 09:05

## 2024-01-06 RX ADMIN — IPRATROPIUM BROMIDE AND ALBUTEROL SULFATE 3 ML: .5; 3 SOLUTION RESPIRATORY (INHALATION) at 19:29

## 2024-01-06 RX ADMIN — INSULIN ASPART 1 UNITS: 100 INJECTION, SOLUTION INTRAVENOUS; SUBCUTANEOUS at 12:24

## 2024-01-06 RX ADMIN — AZITHROMYCIN MONOHYDRATE 500 MG: 500 INJECTION, POWDER, LYOPHILIZED, FOR SOLUTION INTRAVENOUS at 02:43

## 2024-01-06 RX ADMIN — INSULIN ASPART 2 UNITS: 100 INJECTION, SOLUTION INTRAVENOUS; SUBCUTANEOUS at 15:45

## 2024-01-06 RX ADMIN — ENOXAPARIN SODIUM 40 MG: 40 INJECTION SUBCUTANEOUS at 12:24

## 2024-01-06 RX ADMIN — INSULIN ASPART 1 UNITS: 100 INJECTION, SOLUTION INTRAVENOUS; SUBCUTANEOUS at 20:18

## 2024-01-06 RX ADMIN — IPRATROPIUM BROMIDE AND ALBUTEROL SULFATE 3 ML: .5; 3 SOLUTION RESPIRATORY (INHALATION) at 07:20

## 2024-01-06 RX ADMIN — PANTOPRAZOLE SODIUM 40 MG: 40 INJECTION, POWDER, FOR SOLUTION INTRAVENOUS at 09:05

## 2024-01-06 RX ADMIN — IPRATROPIUM BROMIDE AND ALBUTEROL SULFATE 3 ML: .5; 3 SOLUTION RESPIRATORY (INHALATION) at 17:14

## 2024-01-06 ASSESSMENT — ACTIVITIES OF DAILY LIVING (ADL)
ADLS_ACUITY_SCORE: 46
ADLS_ACUITY_SCORE: 46
DEPENDENT_IADLS:: INDEPENDENT
ADLS_ACUITY_SCORE: 47
ADLS_ACUITY_SCORE: 47
ADLS_ACUITY_SCORE: 46

## 2024-01-06 NOTE — PLAN OF CARE
Goal Outcome Evaluation:         Pt A/Ox4 afebrile compliant with NC 6L-BIPAP HS. Dyspnea talking/mild movement in the bed. Pain to R side(pt reports needs to have a BM-passing gas). CV WNL, Resp-course/wheezes. BGL WNL good up with purwick. No bm with bedpan. Refused compression stockings.     Plan: BM-OOB    Problem: Adult Inpatient Plan of Care  Goal: Plan of Care Review  Description: The Plan of Care Review/Shift note should be completed every shift.  The Outcome Evaluation is a brief statement about your assessment that the patient is improving, declining, or no change.  This information will be displayed automatically on your shift  note.  Outcome: Progressing     Problem: Gas Exchange Impaired  Goal: Optimal Gas Exchange  Outcome: Progressing  Intervention: Optimize Oxygenation and Ventilation  Recent Flowsheet Documentation  Taken 1/6/2024 0600 by Fantasma Gonzales RN  Head of Bed (HOB) Positioning: HOB at 30-45 degrees  Taken 1/6/2024 0400 by Fantasma Gonzales RN  Airway/Ventilation Management: airway patency maintained  Head of Bed (HOB) Positioning: HOB at 30-45 degrees  Taken 1/6/2024 0200 by Fantasma Gonzales RN  Head of Bed (HOB) Positioning: HOB at 30-45 degrees  Taken 1/6/2024 0000 by Fantasma Gonzales RN  Airway/Ventilation Management: airway patency maintained  Head of Bed (HOB) Positioning: HOB at 30-45 degrees  Taken 1/5/2024 2200 by Fantasma Gonzales RN  Head of Bed (HOB) Positioning: HOB at 30-45 degrees  Taken 1/5/2024 2000 by Fantasma Gonzales RN  Airway/Ventilation Management: airway patency maintained  Head of Bed (HOB) Positioning: HOB at 30-45 degrees     Problem: Fall Injury Risk  Goal: Absence of Fall and Fall-Related Injury  Outcome: Progressing

## 2024-01-06 NOTE — PROGRESS NOTES
"   01/06/24 1440   Appointment Info   Signing Clinician's Name / Credentials (PT) Pat Barrera, PT, DPT   Rehab Comments (PT) 5LPM O2 vs 10 LPM O2 with activity.   Living Environment   People in Home spouse   Current Living Arrangements house   Living Environment Comments one level, has a recliner chair   Self-Care   Usual Activity Tolerance fair   Current Activity Tolerance poor   Equipment Currently Used at Home shower chair;wheelchair, manual;other (see comments)  (Oxygen)   Fall history within last six months no   Activity/Exercise/Self-Care Comment Reports she's on 6L at rest, 10L when she's up at home. Mainly transfers with IND. The most she walks is about 4 feet. She \"chair hops\" to the bathroom, has chairs place about 4 feet apart and sits to recover before going further. Patient was showering IND, pulling her pants up after A over feet from . Was ordering groceries, using the toilet IND.   General Information   Onset of Illness/Injury or Date of Surgery 01/03/24   Referring Physician Jacoby Irene MD   Patient/Family Therapy Goals Statement (PT) Planning on going home when able. Open to home therapy.   Pertinent History of Current Problem (include personal factors and/or comorbidities that impact the POC) Pt is a 68 year old female admitted on 1/3/2024. She presents as a direct admission from [facility] with acute respiratory failure and hypoxia in the setting of chronic idiopathic pulmonary fibrosis requiring 5 L nasal cannula oxygen at rest even at baseline.  Found to have RSV and worsened hypoxia. PMHx significant for  pulmonary fibrosis, pulmonary hypertension, chronic cor pulmonale.   Existing Precautions/Restrictions cardiac;fall;oxygen therapy device and L/min;other (see comments)  (6LPM O2 at rest, 10LPM O2 with activity. DNR/DNI)   Cognition   Affect/Mental Status (Cognition) WFL   Orientation Status (Cognition) oriented x 4   Follows Commands (Cognition) WFL   Pain " Assessment   Patient Currently in Pain No   Integumentary/Edema   Integumentary/Edema Comments 1-2+ edema bilaterally LE and feet, non pitting, pt reports has not tolerated compression socks in the past.   Posture    Posture Forward head position;Protracted shoulders   Range of Motion (ROM)   ROM Comment Grossly WFL BUE and LE with functional transfers.   Strength (Manual Muscle Testing)   Strength Comments Grossly antigravity strength BUE and LE with functional transfers. Global weakness.   Left Knee (Manual Muscle Testing)   Knee Flexion - Left Side (5/5) normal,left   Knee Extension - Left Side (5/5) normal,left   Right Knee (Manual Muscle Testing)   Knee Flexion - Right Side (5/5) normal,right   Knee Extension - Right Side (5/5) normal,right   Bed Mobility   Comment, (Bed Mobility) sit>supine HOB approx 50 deg, SBA   Transfers   Comment, (Transfers) sit>stand, SBA   Gait/Stairs (Locomotion)   Comment, (Gait/Stairs) amb with no AD, approx 5', SBA, no LOB, wide MARIA.   Balance   Balance Comments able to maintain seated balance ind. Able to maintain standing balance, ind.   Sensory Examination   Sensory Perception patient reports no sensory changes   Clinical Impression   Criteria for Skilled Therapeutic Intervention Yes, treatment indicated   PT Diagnosis (PT) Impaired functional mobility   Influenced by the following impairments decreased activity tolerance, decreased strength   Functional limitations due to impairments impaired functional independence, impaired ADLs and IADLs   Clinical Presentation (PT Evaluation Complexity) stable   Clinical Presentation Rationale per MR and clinical judgement   Clinical Decision Making (Complexity) low complexity   Planned Therapy Interventions (PT) balance training;bed mobility training;gait training;home exercise program;manual therapy techniques;patient/family education;ROM (range of motion);strengthening;stretching;transfer training;wheelchair management/propulsion  training;progressive activity/exercise   Risk & Benefits of therapy have been explained evaluation/treatment results reviewed;care plan/treatment goals reviewed;risks/benefits reviewed;current/potential barriers reviewed;participants voiced agreement with care plan;participants included;patient;spouse/significant other;daughter;son   PT Total Evaluation Time   PT Eval, Low Complexity Minutes (43204) 11   Physical Therapy Goals   PT Frequency Daily   PT Predicted Duration/Target Date for Goal Attainment 01/09/24   PT Goals Bed Mobility;Transfers;Gait;Edema   PT: Bed Mobility Supervision/stand-by assist;Supine to/from sit;Rolling   PT: Transfers Supervision/stand-by assist;Sit to/from stand;Bed to/from chair   PT: Gait Supervision/stand-by assist;5 feet   PT: Edema education to increase ability to manage edema after discharge from the hospital Patient;Caregiver;Verbalize;Demonstrate;Max assist;Skin care routine;signs/symptoms of intolerance;wear schedule;limb positioning;discharge recommendations   PT: Management of edema bandages Patient;Caregiver;Verbalize;Demonstrate;Max assist;garment(s)   Interventions   Interventions Quick Adds Therapeutic Activity;Manual Therapy   Therapeutic Activity   Therapeutic Activities: dynamic activities to improve functional performance Minutes (79280) 15   Symptoms Noted During/After Treatment Shortness of breath   Treatment Detail/Skilled Intervention Pt agreed to therapy session. Pt on 6LPM O2 at rest, 10 LPM O2 during activity. Increased time for room set up, chair set up, skilled monitoring of vitals. Pt completed additional STS to no AD, SBA progresing to ind, increased time and effort. Pt completed additional chair to bed transfer, amb approx 3', no AD, SBA. SPO2 decreased to 85-88%, pt recovers to 90-92% with time, cues for PLB throughout. Pt able to reposition self in bed ind. Edu pt and family on discharge plans, edu on equipment set up, pt and family verbalized  understanding. Pt left in bed, bed alarm on, all needs in reach, BLE elvated on pillows for pressure relief and edema control. Pt placed back on 6 LPM O2, SPO2 ranged 92-93%.   Manual Therapy   Manual Therapy Minutes (56776) 10   Symptoms Noted During/After Treatment None   Treatment Detail/Skilled Intervention Pt presents with 1+ edema in B LE. Pt is appropriate for B LE spandigrip size E. Pt reports did not tolerate heladio stockings in the past. Donned size E spandigrip garments from base of toes to just below the knee. Pt reports comfortable. Edu pt on elevation of LE for edema control, ankle pumps for circulation. Pt was educated on signs and symptoms of edema intolerance and verbalized understanding. Therapist also educated pt on OP edema clinic and issued educational handouts for Edema in LE and for OP Edema clinic. Increased time also spent coordinating and educating patient's RN on rehab plan spandigrip.   PT Discharge Planning   PT Plan assess edema and tolerance for spandagrip garments, discuss bed wedges and progress transfers as able   PT Discharge Recommendation (DC Rec) home with assist;home with home care physical therapy;Leaving home requires significant assistance;Leaving home requires significant taxing effort;Leaving home is limited by medical status   PT Rationale for DC Rec Pt tolerated session well. Pt appears to be near baseline functional independence, limited by decreased activity tolerance. At baseline, pt amb approx 4' at a time to chair within home, has assist as needed. Anticipate with continued mobilization with nursing staff and IP PT/OT, pt may discharge to home with assist as needed, recommend pt continue with HHPT to progress independence. Pt currently SBA-ind for transfers. Will continue to update as appropriate.   PT Brief overview of current status bed mob, SBA. amb bed to chair, SBA. Recommend pt up in chair for meals 3x per day.   Total Session Time   Timed Code Treatment Minutes  25   Total Session Time (sum of timed and untimed services) 36

## 2024-01-06 NOTE — PROGRESS NOTES
[facility]    Medicine Progress Note - Hospitalist Service    Date of Admission:  1/3/2024    Assessment & Plan   Acute on chronic hypoxic and hypercapnic respiratory failure  (+) respiratory syncytial virus (RSV) infection 1/3/24, abnormal CT chest  Pulmonary hypertension, history of chronic cor pulmonale  Idiopathic pulmonary fibrosis  History of obstructive sleep apnea, at bedtime BIPAP use  Chronic respiratory failure, on home oxygen  Chronic lymphedema  Morbid obesity, recent Body mass index is 43.54 kg/m .  Type II diabetes mellitus, A1c 6.8% 10/12/23  Stress-induced hyperglycemia due to infection and corticosteroids  Essential hypertension  Gastroesophageal reflux disease (GERD)  Osteopenia  DNR, DNI code status  DVT prophylaxis  Weakness and deconditioning   Goals of care, palliative care consult 1/4/24  Disposition    Mary E Weiland is a 68 year old female with past medical history above admitted 1/3/2024 with shortness of breath, cough, acute on chronic respiratory failure, and (+) RSV.  Complex medical history including chronic cor pulmonale, pulmonary hypertension, idiopathic pulmonary fibrosis, obstructive sleep apnea, and morbid obesity.  Additional past medical history outlined above.  Pulmonary consulted and admitted to [facility] [place] of [place]PAP.    Consults - Pulmonary, Palliative Care    Complex patient with idiopathic pulmonary fibrosis, sleep apnea, chronic cor pulmonale and pulmonary hypertension with new RSV infection this admission.  Pulmonary consulted with ongoing follow-up.  Transition from IV to oral steroids, continue prednisone 40 mg daily with taper outlined in pulmonary note 1/5/24.  Ongoing diuresis with furosemide with DuoNeb nebulizers as ordered.  Azithromycin discontinued per pulmonary recommendations.  Encourage incentive spirometry.  Wean oxygen as able, baseline 10 L at home prior to admission.  Continue respiratory isolation protocol for acute RSV  infection.  Palliative care consulted 1/4/2024 with goals of care discussed.  Restorative cares at this time with follow-up after the weekend.  DNR, DNI CODE STATUS previously confirmed and will be respected.  Significant comorbidities including pulmonary fibrosis, pulmonary hypertension, chronic cor pulmonale, and new RSV respiratory infection with acute on chronic respiratory failure.    Physical and occupational therapy consulted for weakness and deconditioning.  Patient is a physical therapist by training.  Continue to increase activity slowly as tolerated and monitor oxygen saturations.  Diet advanced to regular provided not on BiPAP.  Monitor closely.  Monitor sleep apnea in the setting of acute on chronic respiratory failure and acute RSV infection.  BiPAP at night as ordered and during the day only as needed for hypoxia and increased work of breathing.  Monitor oxygen saturations.  History chronic lymphedema, monitor.  Continue conservative medical management.  Morbid obesity, long-term weight loss appropriate.  BMI >40.  Lifestyle modifications, follow-up with primary clinic provider.  Monitor blood sugars for diabetes mellitus, type II.  A1c 6.8%.  Stress-induced hyperglycemia also playing a role, exacerbated by recent corticosteroids.  Monitor blood pressure, recently under satisfactory control.  Prior to admission on extended release diltiazem, irbesartan, with daily furosemide.  Continue extended release diltiazem and irbesartan.  IV furosemide ordered on admission, with plans to transition to oral furosemide 1/7.  Good diuresis with (-) I/O of ~3.5 L on 1/6/24  Gastroesophageal reflux history, stable, with transition from IV pantoprazole (Protonix) to oral 1/6.  Continue PPI, especially with concurrent corticosteroid use.  Monitor for symptoms.  Enoxaparin (Lovenox) for DVT prophylaxis measures.  DNR, DNI CODE STATUS confirmed on admission and subsequently.  Palliative care consulted 1/4/2024 to review  goals of care, see consult note.  Restorative cares at this time with follow-up by palliative care next week.  Social work consulted for discharge planning.  Estimated length 2 to 3 days pending continued clinical improvement and subspecialty recommendations.  Family updated at bedside 1/6/24 including her son, daughter, and .  All appreciative of cares provided.    Results  CT Chest 1/3/24  1.  No PE or dissection.  2.  3.9 cm in greatest radial dimension enlargement of the main pulmonary artery which can be associated with pulmonary arterial hypertension.  3.  Advanced scattered bilateral groundglass and interstitial lung infiltrates which are nonspecific, but may be inflammatory in nature with presumed significant mediastinal/hilar adenopathy.  4.  Findings of mosaic perfusion in both lungs which is nonspecific, however this is most typically associated with changes of air trapping secondary to small airway inflammation    Chest x-ray 1/4/24  IMPRESSION: Multifocal interstitial and patchy opacities in the lungs  likely due to pulmonary edema or atypical pneumonia. No pleural  effusion or pneumothorax. Mild cardiomegaly.    ECHO 8/9/23  Contrast not used due to no IV access.  Left ventricular systolic function is normal.  The visual ejection fraction is 60-65%.  Regional wall motion abnormalities cannot be excluded due to limited  visualization.  RV systolic function probably normal although accuracy limited in absence of  IV contrast use.  Consider CMR for evaluation of cardiac chambers and function if clinically  appropriate. The study was technically difficult. Compared to the prior study  dated 3/21, there have been no changes.          Diet: Regular Diet Adult    DVT Prophylaxis: Enoxaparin (Lovenox) SQ  Hernandez Catheter: Not present  Lines: None     Cardiac Monitoring: ACTIVE order. Indication: respiratory failure  Code Status: No CPR- Do NOT Intubate      Clinically Significant Risk Factors              "     # Hypertension: Noted on problem list       # DMII: A1C = 6.8 % (Ref range: <5.7 %) within past 6 months, PRESENT ON ADMISSION  # Severe Obesity: Estimated body mass index is 43.54 kg/m  as calculated from the following:    Height as of this encounter: 1.702 m (5' 7\").    Weight as of this encounter: 126.1 kg (278 lb)., PRESENT ON ADMISSION            Disposition Plan      Expected Discharge Date: 01/07/2024,  3:00 PM      Discharge Comments: 1/6 bipap            Jacoby Irene MD  Hospitalist Service  [facility]  Securely message with [employer] (more info)  Text page via [employer]/Directory   ______________________________________________________________________    Interval History   Sitting up in bed, family at the bedside, feeling \"about the same\" from yesterday.  Patient discussed with pulmonologist, Dr. Valdivia.  No chest pain.  No nausea or vomiting.  BiPAP overnight, currently on nasal cannula oxygen.  Slowly improving.    Physical Exam   Vital Signs: Temp: 98.1  F (36.7  C) Temp src: Oral BP: 118/72 Pulse: 85   Resp: 28 SpO2: 91 % O2 Device: Nasal cannula Oxygen Delivery: 6 LPM  Weight: 278 lbs 0 oz    GENERAL awake and alert, pleasant and interactive, sitting up in bed  LUNGS no wheezes or crackles, mild to moderate air movement, stable  HEART S1, S2 with RRR  ABDOMEN soft, nontender  MUSCULOSKELETAL extremities with trace pedal edema  SKIN warm and dry  NEURO moves upper and lower extremities spontaneously and to command  MENTAL STATUS answering questions and following simple commands    Medical Decision Making             Data     I have personally reviewed the following data over the past 24 hrs:    N/A  \   N/A   / 151     N/A N/A N/A /  174 (H)   3.5 N/A 0.66 \       Imaging results reviewed over the past 24 hrs:   No results found for this or any previous visit (from the past 24 hour(s)).    "

## 2024-01-06 NOTE — PLAN OF CARE
Goal Outcome Evaluation:    Orientations: AOx4  Vitals/Pain: VSS, 6L NC, dyspnea with extertion, BIPAP at night, denies pain   Tele: SR  Lines/Drains: PIV SL   Skin/Wounds: Blanchable redness to coccyx and groin area  GI/: UOA, - BM, stool softeners given   Labs: Abnormal/Trends, Electrolyte Replacement- Mag and phos recheck in AM, BG Q4  Ambulation/Assist: SBA  Plan: Home with assist when medically ready

## 2024-01-06 NOTE — CONSULTS
Care Management Initial Consult    General Information  Assessment completed with: Children, Jennifer- Phone on voicemail with other family present  Type of CM/SW Visit: Initial Assessment    Primary Care Provider verified and updated as needed: Yes   Readmission within the last 30 days: no previous admission in last 30 days      Reason for Consult: discharge planning  Advance Care Planning: Advance Care Planning Reviewed: other (see comments) (no document, spouse NOK)          Communication Assessment  Patient's communication style: spoken language (English or Bilingual)    Hearing Difficulty or Deaf: yes   Wear Glasses or Blind: no    Cognitive  Cognitive/Neuro/Behavioral: WDL                      Living Environment:   People in home: spouse  Peyton Baca  Current living Arrangements: house      Able to return to prior arrangements: yes  Living Arrangement Comments: goal is to return home    Family/Social Support:  Care provided by: self  Provides care for: no one  Marital Status:   , Children  Keven       Description of Support System: Supportive, Involved    Support Assessment: Adequate family and caregiver support, Adequate social supports    Current Resources:   Patient receiving home care services: No     Community Resources: None  Equipment currently used at home: shower chair, wheelchair, manual  Supplies currently used at home: None    Employment/Financial:  Employment Status: other (see comments) (PT by Background)        Financial Concerns: none   Referral to Financial Worker: No  Finance Comments: [employer]    Does the patient's insurance plan have a 3 day qualifying hospital stay waiver?  Yes     Which insurance plan 3 day waiver is available? Alternative insurance waiver    Will the waiver be used for post-acute placement? Undetermined at this time    Lifestyle & Psychosocial Needs:  Social Determinants of Health     Food Insecurity: Low Risk  (10/9/2023)    Food  Insecurity     Within the past 12 months, did you worry that your food would run out before you got money to buy more?: No     Within the past 12 months, did the food you bought just not last and you didn t have money to get more?: No   Depression: Not at risk (12/4/2023)    PHQ-2     PHQ-2 Score: 0   Housing Stability: Low Risk  (10/9/2023)    Housing Stability     Do you have housing? : Yes     Are you worried about losing your housing?: No   Tobacco Use: Low Risk  (12/6/2023)    Patient History     Smoking Tobacco Use: Never     Smokeless Tobacco Use: Never     Passive Exposure: Not on file   Financial Resource Strain: Low Risk  (10/9/2023)    Financial Resource Strain     Within the past 12 months, have you or your family members you live with been unable to get utilities (heat, electricity) when it was really needed?: No   Alcohol Use: Not on file   Transportation Needs: Low Risk  (10/9/2023)    Transportation Needs     Within the past 12 months, has lack of transportation kept you from medical appointments, getting your medicines, non-medical meetings or appointments, work, or from getting things that you need?: No   Physical Activity: Not on file   Interpersonal Safety: Not on file   Stress: Not on file   Social Connections: Not on file       Functional Status:  Prior to admission patient needed assistance:   Dependent ADLs:: Independent  Dependent IADLs:: Independent  Assesssment of Functional Status: Not at baseline with ADL Functioning    Mental Health Status:  Mental Health Status: No Current Concerns       Chemical Dependency Status:  Chemical Dependency Status: No Current Concerns             Values/Beliefs:  Spiritual, Cultural Beliefs, Nondenominational Practices, Values that affect care: no               Additional Information:  Per care management consult for discharge planning, chart was reviewed. Patient is a pleasant 68 year old female that presented to [facility] on 1/3/24. According to the H &  "P \"past medical history above admitted 1/3/2024 with shortness of breath, cough, acute on chronic respiratory failure, and (+) RSV.  Complex medical history including chronic cor pulmonale, pulmonary hypertension, idiopathic pulmonary fibrosis, obstructive sleep apnea, and morbid obesity.  Additional past medical history outlined above.  Pulmonary consulted.  Admission to [facility] with initiation of BiPAP\".   From chart review, patient lives in a one level home with her spouse. Patient is on 10L o2 at baseline. Per patient report she is on 6L at rest and 10L with activity.  Patient was showering independently and using the restroom. Patient/spouse order groceries. Patient has support from her spouse and children, however, children live [place] of state. Patient has a shower chair at home and typically sleeps in a recliner.   Writer was asked to speak with family about discharge planning. Due to RSV precautions, writer called patient's daughter, Jennifer. Jennifer is in patient's room and placed writer on speaker. Jennifer asked about home support for patient at discharge. Writer explained with patient's insurance plan they do not cover 24/7 services at home. Writer explained patient may qualify for homecare if homebound and expressed frequency. Writer stated she could look for RN/PT/OT/HHA. Writer explained frequency and duration of homecare. Family in agreement with homecare. [facility] sent referral. Family asked for private pay resources to be sent to them. Daughter asked about equipment and writer staed this is not covered under insurance and family would need to order from a store ([employer]). Writer explained when homecare comes out, they can complete a home safety assessment and give recommendations. Daughter appreciative of insight. Private pay resources sent to Jennifer.     Paradise Garcia, CURTIS, LGSW   Social Work   [facility]   "

## 2024-01-06 NOTE — PROGRESS NOTES
"   01/06/24 1038   Appointment Info   Signing Clinician's Name / Credentials (OT) Alexandra Rangel oTR/L   Living Environment   People in Home spouse   Current Living Arrangements house   Living Environment Comments On one level. Patient has a shower chair, \"all the equipment\". Sleeps in a recliner. Would like a [facility] that she can be wheeled in to the bathroom in.   Self-Care   Activity/Exercise/Self-Care Comment Reports she's on 6L at rest, 10L when she's up at home. Mainly transfers with IND. The most she walks is about 4 feet. She \"chair hops\" to the bathroom, has chairs place about 4 feet apart and sits to recover before going further. Patient was showering IND, pulling her pants up after A over feet from . Was ordering groceries, using the toilet IND.   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   General Information   Onset of Illness/Injury or Date of Surgery 01/03/24   Referring Physician Teto   Patient/Family Therapy Goal Statement (OT) Go home. \"I don't want to die in a hospital\".   Additional Occupational Profile Info/Pertinent History of Current Problem Admitted with hypoxia. She reports her o2 was 76% at the clinic. H/o ideopathic pulmonary fibrosis. RSV+. Please read Palliative care consult from 1/4.   Existing Precautions/Restrictions fall;oxygen therapy device and L/min  (6L)   General Observations and Info Satting in low 90s on 6L in bed.   Cognitive Status Examination   Orientation Status orientation to person, place and time   Cognitive Status Comments No cognitive deficits identified.   Visual Perception   Impact of Vision Impairment on Function (Vision) Doesn't have her glasses here. No new deficits.   Pain Assessment   Patient Currently in Pain No   Posture   Posture Comments WFL   Range of Motion Comprehensive   Comment, General Range of Motion WFL   Strength Comprehensive (MMT)   Comment, General Manual Muscle Testing (MMT) Assessment " WFL   Bed Mobility   Comment (Bed Mobility) SB A to EOB. Desats to 85% on 10L.   Transfers   Transfer Comments SB A to monitor vitals   Balance   Balance Comments Not fully upright with transfer, reaching for furniture.   Activities of Daily Living   BADL Assessment/Intervention   (DEP with socks, set up for toileting)   Clinical Impression   Criteria for Skilled Therapeutic Interventions Met (OT) Yes, treatment indicated   OT Diagnosis Decreased ADL IND.   Influenced by the following impairments SOB during transfers, ADL   OT Problem List-Impairments impacting ADL activity tolerance impaired   Assessment of Occupational Performance 3-5 Performance Deficits   Planned Therapy Interventions (OT) ADL retraining;transfer training   Clinical Decision Making Complexity (OT) detailed assessment/moderate complexity   Risk & Benefits of therapy have been explained evaluation/treatment results reviewed;care plan/treatment goals reviewed;patient;spouse/significant other   OT Total Evaluation Time   OT Eval, Moderate Complexity Minutes (67062) 10   OT Goals   Therapy Frequency (OT) Daily   OT Predicted Duration/Target Date for Goal Attainment 01/12/24   OT Goals Hygiene/Grooming;Lower Body Dressing;Upper Body Dressing;Toilet Transfer/Toileting;OT Goal 1   OT: Hygiene/Grooming independent   OT: Upper Body Dressing from wheelchair;Supervision/stand-by assist   OT: Lower Body Dressing Moderate assist;from wheelchair  (SB A, after A to thread clothes over feet)   OT: Toilet Transfer/Toileting Modified independent;toilet transfer;cleaning and garment management   OT: Goal 1 Patient will IND maintain sats >90% on 10 L o2 (her baseline), utilizing PLB/EC as needed   Interventions   Interventions Quick Adds Self-Care/Home Management;Therapeutic Activity   Therapeutic Activities   Therapeutic Activity Minutes (73938) 54   Symptoms noted during/after treatment shortness of breath;increase work of breath   Treatment Detail/Skilled  Intervention Increased o2 to 10L at patient request, prior to transfer, as she does at home. Patient desats to ~85% on 10L nc, taking about 5 min of rest and PLB to recover to >90%. SB A to transfer to BSC, stand from BSC, sit, then back to EOB, all taking about 5 min for sats to recover to >90%. I did educate on PLB, but she states a pulmonologist told her to keep her mouth closed and just breathe thru her nose. She was happy with her performance. Wants to see if she can get back to her baseline and return home (again, please read palliative care note from 1/4).   OT Discharge Planning   OT Plan Don underwear/pull up and have her manage during tx to toilet.   OT Discharge Recommendation (DC Rec) home with assist   OT Rationale for DC Rec Patient did well with transfer today, desats on 10L but recovers in ~ 5 min. Feel that with further medical progress and work with therapy here, she will be near her baseline on discharge.   OT Brief overview of current status SB A to tx, desats to 85% on 10L. Recovers in ~ 5 min.   Total Session Time   Timed Code Treatment Minutes 54   Total Session Time (sum of timed and untimed services) 64

## 2024-01-07 ENCOUNTER — APPOINTMENT (OUTPATIENT)
Dept: PHYSICAL THERAPY | Facility: CLINIC | Age: 69
DRG: 189 | End: 2024-01-07
Attending: STUDENT IN AN ORGANIZED HEALTH CARE EDUCATION/TRAINING PROGRAM
Payer: COMMERCIAL

## 2024-01-07 ENCOUNTER — APPOINTMENT (OUTPATIENT)
Dept: OCCUPATIONAL THERAPY | Facility: CLINIC | Age: 69
DRG: 189 | End: 2024-01-07
Attending: STUDENT IN AN ORGANIZED HEALTH CARE EDUCATION/TRAINING PROGRAM
Payer: COMMERCIAL

## 2024-01-07 LAB
ANION GAP SERPL CALCULATED.3IONS-SCNC: 8 MMOL/L (ref 7–15)
BUN SERPL-MCNC: 23.2 MG/DL (ref 8–23)
CALCIUM SERPL-MCNC: 9.3 MG/DL (ref 8.8–10.2)
CHLORIDE SERPL-SCNC: 98 MMOL/L (ref 98–107)
CREAT SERPL-MCNC: 0.6 MG/DL (ref 0.51–0.95)
DEPRECATED HCO3 PLAS-SCNC: 34 MMOL/L (ref 22–29)
EGFRCR SERPLBLD CKD-EPI 2021: >90 ML/MIN/1.73M2
GLUCOSE BLDC GLUCOMTR-MCNC: 103 MG/DL (ref 70–99)
GLUCOSE BLDC GLUCOMTR-MCNC: 172 MG/DL (ref 70–99)
GLUCOSE BLDC GLUCOMTR-MCNC: 181 MG/DL (ref 70–99)
GLUCOSE BLDC GLUCOMTR-MCNC: 220 MG/DL (ref 70–99)
GLUCOSE BLDC GLUCOMTR-MCNC: 80 MG/DL (ref 70–99)
GLUCOSE BLDC GLUCOMTR-MCNC: 94 MG/DL (ref 70–99)
GLUCOSE SERPL-MCNC: 93 MG/DL (ref 70–99)
MAGNESIUM SERPL-MCNC: 2.3 MG/DL (ref 1.7–2.3)
POTASSIUM SERPL-SCNC: 3.4 MMOL/L (ref 3.4–5.3)
POTASSIUM SERPL-SCNC: 4.4 MMOL/L (ref 3.4–5.3)
SODIUM SERPL-SCNC: 140 MMOL/L (ref 135–145)

## 2024-01-07 PROCEDURE — 250N000012 HC RX MED GY IP 250 OP 636 PS 637: Performed by: INTERNAL MEDICINE

## 2024-01-07 PROCEDURE — 250N000013 HC RX MED GY IP 250 OP 250 PS 637: Performed by: HOSPITALIST

## 2024-01-07 PROCEDURE — 84132 ASSAY OF SERUM POTASSIUM: CPT | Performed by: HOSPITALIST

## 2024-01-07 PROCEDURE — 999N000157 HC STATISTIC RCP TIME EA 10 MIN

## 2024-01-07 PROCEDURE — 94640 AIRWAY INHALATION TREATMENT: CPT | Mod: 76

## 2024-01-07 PROCEDURE — 36415 COLL VENOUS BLD VENIPUNCTURE: CPT | Performed by: HOSPITALIST

## 2024-01-07 PROCEDURE — 97530 THERAPEUTIC ACTIVITIES: CPT | Mod: GP

## 2024-01-07 PROCEDURE — 94660 CPAP INITIATION&MGMT: CPT

## 2024-01-07 PROCEDURE — 250N000013 HC RX MED GY IP 250 OP 250 PS 637: Performed by: INTERNAL MEDICINE

## 2024-01-07 PROCEDURE — 83735 ASSAY OF MAGNESIUM: CPT | Performed by: HOSPITALIST

## 2024-01-07 PROCEDURE — 250N000009 HC RX 250: Performed by: INTERNAL MEDICINE

## 2024-01-07 PROCEDURE — 97535 SELF CARE MNGMENT TRAINING: CPT | Mod: GO | Performed by: OCCUPATIONAL THERAPIST

## 2024-01-07 PROCEDURE — 99232 SBSQ HOSP IP/OBS MODERATE 35: CPT | Performed by: HOSPITALIST

## 2024-01-07 PROCEDURE — 94640 AIRWAY INHALATION TREATMENT: CPT

## 2024-01-07 PROCEDURE — 250N000011 HC RX IP 250 OP 636: Performed by: INTERNAL MEDICINE

## 2024-01-07 PROCEDURE — 80048 BASIC METABOLIC PNL TOTAL CA: CPT | Performed by: HOSPITALIST

## 2024-01-07 PROCEDURE — 120N000013 HC R&B IMCU

## 2024-01-07 RX ORDER — PREDNISONE 20 MG/1
40 TABLET ORAL DAILY
Status: COMPLETED | OUTPATIENT
Start: 2024-01-08 | End: 2024-01-08

## 2024-01-07 RX ORDER — POTASSIUM CHLORIDE 1500 MG/1
40 TABLET, EXTENDED RELEASE ORAL ONCE
Status: COMPLETED | OUTPATIENT
Start: 2024-01-07 | End: 2024-01-07

## 2024-01-07 RX ORDER — PREDNISONE 20 MG/1
20 TABLET ORAL DAILY
Status: DISCONTINUED | OUTPATIENT
Start: 2024-01-12 | End: 2024-01-09 | Stop reason: HOSPADM

## 2024-01-07 RX ORDER — PREDNISONE 5 MG/1
10 TABLET ORAL DAILY
Status: DISCONTINUED | OUTPATIENT
Start: 2024-01-15 | End: 2024-01-09 | Stop reason: HOSPADM

## 2024-01-07 RX ORDER — PREDNISONE 5 MG/1
5 TABLET ORAL DAILY
Status: DISCONTINUED | OUTPATIENT
Start: 2024-01-18 | End: 2024-01-09 | Stop reason: HOSPADM

## 2024-01-07 RX ADMIN — INSULIN ASPART 1 UNITS: 100 INJECTION, SOLUTION INTRAVENOUS; SUBCUTANEOUS at 16:03

## 2024-01-07 RX ADMIN — PREDNISONE 40 MG: 20 TABLET ORAL at 08:35

## 2024-01-07 RX ADMIN — FUROSEMIDE 40 MG: 40 TABLET ORAL at 08:35

## 2024-01-07 RX ADMIN — IRBESARTAN 300 MG: 150 TABLET ORAL at 08:35

## 2024-01-07 RX ADMIN — IPRATROPIUM BROMIDE AND ALBUTEROL SULFATE 3 ML: .5; 3 SOLUTION RESPIRATORY (INHALATION) at 07:32

## 2024-01-07 RX ADMIN — DOCUSATE SODIUM 50 MG AND SENNOSIDES 8.6 MG 2 TABLET: 8.6; 5 TABLET, FILM COATED ORAL at 08:45

## 2024-01-07 RX ADMIN — ENOXAPARIN SODIUM 40 MG: 40 INJECTION SUBCUTANEOUS at 11:45

## 2024-01-07 RX ADMIN — DILTIAZEM HYDROCHLORIDE 300 MG: 300 CAPSULE, EXTENDED RELEASE ORAL at 08:35

## 2024-01-07 RX ADMIN — ENOXAPARIN SODIUM 40 MG: 40 INJECTION SUBCUTANEOUS at 23:13

## 2024-01-07 RX ADMIN — INSULIN ASPART 1 UNITS: 100 INJECTION, SOLUTION INTRAVENOUS; SUBCUTANEOUS at 11:45

## 2024-01-07 RX ADMIN — IPRATROPIUM BROMIDE AND ALBUTEROL SULFATE 3 ML: .5; 3 SOLUTION RESPIRATORY (INHALATION) at 10:41

## 2024-01-07 RX ADMIN — ENOXAPARIN SODIUM 40 MG: 40 INJECTION SUBCUTANEOUS at 00:35

## 2024-01-07 RX ADMIN — POTASSIUM CHLORIDE 40 MEQ: 1500 TABLET, EXTENDED RELEASE ORAL at 08:35

## 2024-01-07 RX ADMIN — PANTOPRAZOLE SODIUM 40 MG: 40 TABLET, DELAYED RELEASE ORAL at 08:35

## 2024-01-07 RX ADMIN — IPRATROPIUM BROMIDE AND ALBUTEROL SULFATE 3 ML: .5; 3 SOLUTION RESPIRATORY (INHALATION) at 15:36

## 2024-01-07 RX ADMIN — INSULIN ASPART 2 UNITS: 100 INJECTION, SOLUTION INTRAVENOUS; SUBCUTANEOUS at 20:24

## 2024-01-07 RX ADMIN — IPRATROPIUM BROMIDE AND ALBUTEROL SULFATE 3 ML: .5; 3 SOLUTION RESPIRATORY (INHALATION) at 19:12

## 2024-01-07 RX ADMIN — FLUTICASONE FUROATE AND VILANTEROL TRIFENATATE 1 PUFF: 200; 25 POWDER RESPIRATORY (INHALATION) at 08:35

## 2024-01-07 RX ADMIN — PANTOPRAZOLE SODIUM 40 MG: 40 TABLET, DELAYED RELEASE ORAL at 16:04

## 2024-01-07 ASSESSMENT — ACTIVITIES OF DAILY LIVING (ADL)
ADLS_ACUITY_SCORE: 46

## 2024-01-07 NOTE — PLAN OF CARE
Goal Outcome Evaluation:    Orientation: AOX4, [place] R ear  Vitals/Tele: VSS on BiPAP 35% HS, tele SR  IV Access/drains: PIV SL  Diet: Regular w/ things  Mobility: SBA w/ GB  GI/: Purewick in place, one unmeasured void overnight, no BM during shift  Wound/Skin: Blanchable red coccyx  Discharge Plan: Pending, home with assist?  Summary: Patient on BiPAP while sleeping. K+ replacement ordered, Mg redraw in AM. Droplet and contact precautions continued.      See Flow sheets for assessment

## 2024-01-07 NOTE — PLAN OF CARE
Goal Outcome Evaluation:    Orientations: AOx4  Vitals/Pain: VSS, 6L NC, dyspnea with extertion, BIPAP at night, denies pain   Tele: SR  Lines/Drains: PIV SL   Skin/Wounds: Blanchable redness to coccyx and groin area  GI/: UOA, +BM  Labs: Abnormal/Trends, Electrolyte Replacement- Mag and phos recheck in AM, BG Q4  Ambulation/Assist: SBA  Plan: Discharge home with assist in the next 1-2 days

## 2024-01-07 NOTE — PROGRESS NOTES
[facility]    Medicine Progress Note - Hospitalist Service    Date of Admission:  1/3/2024    Assessment & Plan   Acute on chronic hypoxic and hypercapnic respiratory failure  (+) Respiratory syncytial virus (RSV) infection 1/3/24, abnormal CT chest  Pulmonary hypertension, history of chronic cor pulmonale  Idiopathic pulmonary fibrosis  History of obstructive sleep apnea, at bedtime BIPAP use  Chronic respiratory failure, on home oxygen  Chronic lymphedema  Morbid obesity, recent Body mass index is 43.54 kg/m .  Type II diabetes mellitus, A1c 6.8% 10/12/23  Stress-induced hyperglycemia due to infection and corticosteroids  Essential hypertension  Gastroesophageal reflux disease (GERD)  Osteopenia  DNR, DNI code status  DVT prophylaxis  Weakness and deconditioning   Goals of care, palliative care consult 1/4/24  Disposition    Mary E Weiland is a 68 year old female with past medical history above admitted 1/3/2024 with shortness of breath, cough, acute on chronic respiratory failure, and (+) RSV.  Complex medical history including chronic cor pulmonale, pulmonary hypertension, idiopathic pulmonary fibrosis, obstructive sleep apnea, and morbid obesity.  Additional past medical history outlined above.  Pulmonary consulted and admitted to [facility] with initiation of BiPAP.    Consults - Pulmonary, Palliative Care    Complex patient with idiopathic pulmonary fibrosis, sleep apnea, chronic cor pulmonale and pulmonary hypertension with new +RSV infection this admission.  Pulmonary consulted with ongoing follow-up.  Transitioned from IV to oral steroids, prednisone, with taper outlined in pulmonary note 1/5/24.  Continue ongoing diuresis with furosemide.  Continue with DuoNeb nebulizers as ordered.  Azithromycin previously discontinued per pulmonary recommendations.  No clear evidence of bacterial infection.  Encourage incentive spirometry.  Wean oxygen as able and back to baseline 10 L as at home  prior to admission.  Continue respiratory isolation protocol for acute RSV infection.  Palliative care consulted 1/4/2024 with goals of care discussed.  Restorative cares at this time with follow-up after the weekend, early this week.  DNR, DNI CODE STATUS previously confirmed and will be respected.  Significant comorbidities including pulmonary fibrosis, pulmonary hypertension, chronic cor pulmonale, and new RSV respiratory infection with acute on chronic respiratory failure.    Physical and occupational therapy consulted for weakness and deconditioning.  Of note, patient is a physical therapist by training.  Continue to increase activity slowly as tolerated and monitor oxygen saturations.  Diet advanced to regular.  NPO when previously on bilevel positive airway pressure.  Monitor sleep apnea in the setting of acute on chronic respiratory failure and acute RSV infection.  BiPAP at night as ordered and during the day only as needed for hypoxia and increased work of breathing.  Monitor oxygen saturations.  History chronic lymphedema, monitor.  Continue conservative medical management.  Recent diuresis with oral furosemide (Lasix).  Morbid obesity, long-term weight loss appropriate.  BMI >40.  Lifestyle modifications, follow-up with primary clinic provider.  Monitor blood sugars for diabetes mellitus, type II.  A1c 6.8%.  Stress-induced hyperglycemia also playing a role, exacerbated by recent corticosteroids.  Monitor blood pressure, recently under satisfactory control.  Prior to admission on extended release diltiazem, irbesartan, with daily furosemide.  Continue extended release diltiazem and irbesartan.  IV furosemide ordered on admission, with transition to oral furosemide 1/7.  Good diuresis with (-) I/O of ~3.54L by 1/7/24, continue to monitor.  Gastroesophageal reflux history, stable, with transition from IV pantoprazole (Protonix) to oral 1/6.  Continue PPI, especially with concurrent corticosteroid use for  GI prophylaxis.  Monitor for symptoms.  Enoxaparin (Lovenox) for DVT prophylaxis measures, increase activity as tolerated.  DNR, DNI CODE STATUS confirmed on admission and subsequently.  Palliative care consulted 1/4/2024 to review goals of care, see consult note.  Restorative cares at this time with follow-up by palliative care early this week.  Social work consulted for discharge planning.  Estimated length 1 to 2 days pending continued clinical improvement and consult recommendations.  Family updated at bedside 1/6/24 including her son, daughter, and .  All appreciative of cares provided.    Change in hospitalist provider tomorrow with one of my [facility] hospitalist colleagues assuming care.    Results  CT Chest 1/3/24  1.  No PE or dissection.  2.  3.9 cm in greatest radial dimension enlargement of the main pulmonary artery which can be associated with pulmonary arterial hypertension.  3.  Advanced scattered bilateral groundglass and interstitial lung infiltrates which are nonspecific, but may be inflammatory in nature with presumed significant mediastinal/hilar adenopathy.  4.  Findings of mosaic perfusion in both lungs which is nonspecific, however this is most typically associated with changes of air trapping secondary to small airway inflammation    Chest x-ray 1/4/24  IMPRESSION: Multifocal interstitial and patchy opacities in the lungs  likely due to pulmonary edema or atypical pneumonia. No pleural  effusion or pneumothorax. Mild cardiomegaly.    ECHO 8/9/23  Contrast not used due to no IV access.  Left ventricular systolic function is normal.  The visual ejection fraction is 60-65%.  Regional wall motion abnormalities cannot be excluded due to limited  visualization.  RV systolic function probably normal although accuracy limited in absence of  IV contrast use.  Consider CMR for evaluation of cardiac chambers and function if clinically  appropriate. The study was technically difficult.  "Compared to the prior study  dated 3/21, there have been no changes.          Diet: Regular Diet Adult    DVT Prophylaxis: Enoxaparin (Lovenox) SQ  Hernandez Catheter: Not present  Lines: None     Cardiac Monitoring: ACTIVE order. Indication: respiratory failure  Code Status: No CPR- Do NOT Intubate      Clinically Significant Risk Factors                  # Hypertension: Noted on problem list       # DMII: A1C = 6.8 % (Ref range: <5.7 %) within past 6 months, PRESENT ON ADMISSION  # Severe Obesity: Estimated body mass index is 43.54 kg/m  as calculated from the following:    Height as of this encounter: 1.702 m (5' 7\").    Weight as of this encounter: 126.1 kg (278 lb)., PRESENT ON ADMISSION     # Financial/Environmental Concerns: none         Disposition Plan      Expected Discharge Date: 01/08/2024,  3:00 PM    Destination: home with help/services  Discharge Comments: 1/6 bipap            Jacoby Irene MD  Hospitalist Service  [facility]  Securely message with [employer] (more info)  Text page via [employer] Paging/Directory   ______________________________________________________________________    Interval History   Feeling better overall, less short of breath, continued BiPAP at night as prior to admission with nasal cannula during the day.  Continues with isolation for acute RSV infection with supportive cares including corticosteroids directed by pulmonary team.  Physical therapy consulted yesterday and remains very weak and deconditioned.  Possible discharge home with assistance in the next 24 to 48 hours if continued clinical improvement.  No increased shortness of breath or new symptoms reported.    Physical Exam   Vital Signs: Temp: 98.1  F (36.7  C) Temp src: Oral BP: 125/86 Pulse: 79   Resp: 22 SpO2: 93 % O2 Device: Nasal cannula Oxygen Delivery: 6 LPM  Weight: 278 lbs 0 oz    GENERAL awake and alert, sitting up in no acute distress, feeling better overall  LUNGS no wheezes or " crackles, mild to moderate air movement, stable/improved  HEART S1, S2 with RRR  ABDOMEN soft, nontender  MUSCULOSKELETAL extremities with trace pedal edema, stable  SKIN warm and dry  NEURO moves upper and lower extremities spontaneously and to command  MENTAL STATUS answering questions and following simple commands    Medical Decision Making             Data     I have personally reviewed the following data over the past 24 hrs:    N/A  \   N/A   / N/A     140 98 23.2 (H) /  172 (H)   4.4 34 (H) 0.60 \       Imaging results reviewed over the past 24 hrs:   No results found for this or any previous visit (from the past 24 hour(s)).

## 2024-01-07 NOTE — PLAN OF CARE
Physical Therapy Discharge Summary    Reason for therapy discharge:    All goals and outcomes met, no further needs identified.  Pt's mobility needs are being met by nursing and IP OT for ADL needs.  Will complete PT orders to avoid duplication of services at this time.     Progress towards therapy goal(s). See goals on Care Plan in [facility] electronic health record for goal details.  Goals met    Therapy recommendation(s):    Continued therapy is recommended.  Rationale/Recommendations:   Continue home exercise program. PT Discharge Planning  PT Plan: discharge at this time  PT Discharge Recommendation (DC Rec): home with assist, home with home care physical therapy, Leaving home requires significant assistance, Leaving home requires significant taxing effort, Leaving home is limited by medical status  PT Rationale for DC Rec: Pt tolerated session well. Pt appears to be near baseline functional independence, limited by decreased activity tolerance. At baseline, pt amb approx 4' at a time to chair within home, has assist as needed. Anticipate with continued mobilization with nursing staff and IP PT/OT, pt may discharge to home with assist as needed, recommend pt continue with HHPT to progress independence. Pt currently SBA-ind for transfers. Will defer to nursing and OT to address IP mobility/ADL needs at this time.  PT Brief overview of current status: bed mob, SBA. amb bed to chair, SBA. Recommend pt up in chair for meals 3x per day.

## 2024-01-08 ENCOUNTER — APPOINTMENT (OUTPATIENT)
Dept: OCCUPATIONAL THERAPY | Facility: CLINIC | Age: 69
DRG: 189 | End: 2024-01-08
Attending: STUDENT IN AN ORGANIZED HEALTH CARE EDUCATION/TRAINING PROGRAM
Payer: COMMERCIAL

## 2024-01-08 LAB
GLUCOSE BLDC GLUCOMTR-MCNC: 123 MG/DL (ref 70–99)
GLUCOSE BLDC GLUCOMTR-MCNC: 130 MG/DL (ref 70–99)
GLUCOSE BLDC GLUCOMTR-MCNC: 170 MG/DL (ref 70–99)
GLUCOSE BLDC GLUCOMTR-MCNC: 193 MG/DL (ref 70–99)
GLUCOSE BLDC GLUCOMTR-MCNC: 232 MG/DL (ref 70–99)
GLUCOSE BLDC GLUCOMTR-MCNC: 83 MG/DL (ref 70–99)
GLUCOSE BLDC GLUCOMTR-MCNC: 85 MG/DL (ref 70–99)
MAGNESIUM SERPL-MCNC: 2.3 MG/DL (ref 1.7–2.3)
POTASSIUM SERPL-SCNC: 4 MMOL/L (ref 3.4–5.3)

## 2024-01-08 PROCEDURE — 97535 SELF CARE MNGMENT TRAINING: CPT | Mod: GO | Performed by: OCCUPATIONAL THERAPIST

## 2024-01-08 PROCEDURE — 250N000012 HC RX MED GY IP 250 OP 636 PS 637: Performed by: HOSPITALIST

## 2024-01-08 PROCEDURE — 84132 ASSAY OF SERUM POTASSIUM: CPT | Performed by: HOSPITALIST

## 2024-01-08 PROCEDURE — 120N000013 HC R&B IMCU

## 2024-01-08 PROCEDURE — 250N000009 HC RX 250: Performed by: INTERNAL MEDICINE

## 2024-01-08 PROCEDURE — 999N000157 HC STATISTIC RCP TIME EA 10 MIN

## 2024-01-08 PROCEDURE — 250N000013 HC RX MED GY IP 250 OP 250 PS 637: Performed by: HOSPITALIST

## 2024-01-08 PROCEDURE — 94640 AIRWAY INHALATION TREATMENT: CPT

## 2024-01-08 PROCEDURE — 250N000013 HC RX MED GY IP 250 OP 250 PS 637: Performed by: INTERNAL MEDICINE

## 2024-01-08 PROCEDURE — 99232 SBSQ HOSP IP/OBS MODERATE 35: CPT | Performed by: INTERNAL MEDICINE

## 2024-01-08 PROCEDURE — 83735 ASSAY OF MAGNESIUM: CPT | Performed by: HOSPITALIST

## 2024-01-08 PROCEDURE — 94640 AIRWAY INHALATION TREATMENT: CPT | Mod: 76

## 2024-01-08 PROCEDURE — 36415 COLL VENOUS BLD VENIPUNCTURE: CPT | Performed by: HOSPITALIST

## 2024-01-08 PROCEDURE — 94660 CPAP INITIATION&MGMT: CPT

## 2024-01-08 PROCEDURE — 250N000011 HC RX IP 250 OP 636: Performed by: INTERNAL MEDICINE

## 2024-01-08 RX ORDER — DEXTROSE MONOHYDRATE 25 G/50ML
25-50 INJECTION, SOLUTION INTRAVENOUS
Status: DISCONTINUED | OUTPATIENT
Start: 2024-01-08 | End: 2024-01-08

## 2024-01-08 RX ORDER — NICOTINE POLACRILEX 4 MG
15-30 LOZENGE BUCCAL
Status: DISCONTINUED | OUTPATIENT
Start: 2024-01-08 | End: 2024-01-08

## 2024-01-08 RX ADMIN — IRBESARTAN 300 MG: 150 TABLET ORAL at 08:13

## 2024-01-08 RX ADMIN — IPRATROPIUM BROMIDE AND ALBUTEROL SULFATE 3 ML: .5; 3 SOLUTION RESPIRATORY (INHALATION) at 08:22

## 2024-01-08 RX ADMIN — PREDNISONE 40 MG: 20 TABLET ORAL at 08:11

## 2024-01-08 RX ADMIN — PANTOPRAZOLE SODIUM 40 MG: 40 TABLET, DELAYED RELEASE ORAL at 17:33

## 2024-01-08 RX ADMIN — ENOXAPARIN SODIUM 40 MG: 40 INJECTION SUBCUTANEOUS at 13:07

## 2024-01-08 RX ADMIN — IPRATROPIUM BROMIDE AND ALBUTEROL SULFATE 3 ML: .5; 3 SOLUTION RESPIRATORY (INHALATION) at 18:49

## 2024-01-08 RX ADMIN — IPRATROPIUM BROMIDE AND ALBUTEROL SULFATE 3 ML: .5; 3 SOLUTION RESPIRATORY (INHALATION) at 12:22

## 2024-01-08 RX ADMIN — FUROSEMIDE 40 MG: 40 TABLET ORAL at 08:11

## 2024-01-08 RX ADMIN — FLUTICASONE FUROATE AND VILANTEROL TRIFENATATE 1 PUFF: 200; 25 POWDER RESPIRATORY (INHALATION) at 08:47

## 2024-01-08 RX ADMIN — DILTIAZEM HYDROCHLORIDE 300 MG: 300 CAPSULE, EXTENDED RELEASE ORAL at 08:11

## 2024-01-08 RX ADMIN — IPRATROPIUM BROMIDE AND ALBUTEROL SULFATE 3 ML: .5; 3 SOLUTION RESPIRATORY (INHALATION) at 15:42

## 2024-01-08 RX ADMIN — PANTOPRAZOLE SODIUM 40 MG: 40 TABLET, DELAYED RELEASE ORAL at 08:26

## 2024-01-08 ASSESSMENT — ACTIVITIES OF DAILY LIVING (ADL)
ADLS_ACUITY_SCORE: 46
ADLS_ACUITY_SCORE: 46
ADLS_ACUITY_SCORE: 48
ADLS_ACUITY_SCORE: 47
ADLS_ACUITY_SCORE: 46
ADLS_ACUITY_SCORE: 48
ADLS_ACUITY_SCORE: 46
ADLS_ACUITY_SCORE: 46
ADLS_ACUITY_SCORE: 47
ADLS_ACUITY_SCORE: 48
ADLS_ACUITY_SCORE: 47
ADLS_ACUITY_SCORE: 48

## 2024-01-08 NOTE — PLAN OF CARE
Occupational Therapy Discharge Summary    Reason for therapy discharge:    All goals and outcomes met, no further needs identified.    Progress towards therapy goal(s). See goals on Care Plan in [facility] electronic health record for goal details.  Goals met    Therapy recommendation(s):    No further therapy is recommended. OT goals met. anticipate with medical mgmt and therapy pt will be able to return home with cont'd ./family A with ADL/IADL's at discharge.

## 2024-01-08 NOTE — PLAN OF CARE
Goal Outcome Evaluation:    Orientations: AOx4  Vitals/Pain: VSS, 6L NC, BIPAP @ NOC, dyspnea with extertion, denies pain   Tele: SR  Lines/Drains: PIV SL   Skin/Wounds: Blanchable redness to coccyx and groin area  GI/: External purewick in place~UOA, +BM  Labs: Abnormal/Trends, Electrolyte Replacement- Mag and phos recheck in AM, BG Q4  Ambulation/Assist: SBA  Plan: Discharge home with assist in the next 1-2 days

## 2024-01-08 NOTE — PROGRESS NOTES
[facility]    Medicine Progress Note - Hospitalist Service    Date of Admission:  1/3/2024    Assessment & Plan   Mary E Weiland is a 68 year old female with past medical history including chronic cor pulmonale, pulmonary hypertension, idiopathic pulmonary fibrosis, obstructive sleep apnea, and morbid obesity who is admitted on  1/3/2024 with shortness of breath, cough, acute on chronic respiratory failure, and (+) RSV.     Acute on chronic hypoxic and hypercapnic respiratory failure  (+) Respiratory syncytial virus (RSV) infection 1/3/24, abnormal CT chest  History of obstructive sleep apnea, at bedtime BIPAP use  Chronic respiratory failure, on home oxygen-5L/min at rest and 10L/min w/activity at baseline  Pulmonary hypertension, history of chronic cor pulmonale  Idiopathic pulmonary fibrosis  *presented with worsening shortness of breath. +RSV. Initiated on Solumedrol IV and Bipap on admission.   - transition to PO Prednisone taper on 1/6 per pulm recs(Prednisone taper: 40mg x3 days, 30mg x3 days, 20mg x3 days, 10mg x3 days, 5mg x3 days)   - Continue prior to admission Breo Ellipta 200-25  -Scheduled DuoNebs 4 times daily  -As needed albuterol nebulizer treatments available  - wean oxygen to baseline as able, hs bipap and prn   - Continue respiratory isolation protocol for acute RSV infection.   -Palliative care consulted 1/4/2024 to review goals of care, see consult note.  Restorative cares at this time with follow-up by palliative care early this week.    Type II diabetes mellitus, A1c 6.8% 10/12/23  Stress-induced hyperglycemia due to infection and corticosteroids  *not on meds PTA  - BG currently appears controlled  -change sliding scale insulin insulin to ac and hs  - monitor for hypoglycemia    Chronic lymphedema  Morbid obesity, recent Body mass index is 43.54 kg/m .  - f/up as outpatient      Essential hypertension  Prior to admission on extended release diltiazem, irbesartan,  "with daily furosemide.    - Continue extended release diltiazem and irbesartan.    - IV furosemide ordered on admission, transition to oral furosemide 1/7.    - monitor I/Os    Gastroesophageal reflux disease (GERD)  - continue PO PPI daily    Osteopenia-noted      Deconditioning  - PT/OT- rec home with home PT  - Continue to increase activity slowly as tolerated and monitor oxygen saturations.        Diet: Regular Diet Adult    DVT Prophylaxis: Enoxaparin (Lovenox) SQ  Hernandez Catheter: Not present  Lines: None     Cardiac Monitoring: ACTIVE order. Indication: respiratory failure  Code Status: No CPR- Do NOT Intubate      Clinically Significant Risk Factors                  # Hypertension: Noted on problem list       # DMII: A1C = 6.8 % (Ref range: <5.7 %) within past 6 months   # Severe Obesity: Estimated body mass index is 43.92 kg/m  as calculated from the following:    Height as of this encounter: 1.702 m (5' 7\").    Weight as of this encounter: 127.2 kg (280 lb 6.8 oz).      # Financial/Environmental Concerns: none         Disposition Plan  likely in the next 1-2 days if continued improvement in resp status and oxygen need down to/near baseline    Expected Discharge Date: 01/08/2024,  3:00 PM    Destination: home with help/services  Discharge Comments: 1/6 bipap            Melanie Mensah MD  Hospitalist Service  [facility]  Securely message with [employer] (more info)  Text page via [facility] Paging/Directory   ______________________________________________________________________    Interval History   Patient states her breathing is slowly getting better. Denies n/v or abdominal pain. She is afebrile .  Wore bipap last night. 7L oxygen this am.   Reports nebulized treatment helps her  She reports even at baseline she does minimal activity due to sob/desaturation.   Patient's  at bedside during visit.       Physical Exam   Vital Signs: Temp: 98.1  F (36.7  C) Temp src: Oral BP: " 114/68 Pulse: 66   Resp: 26 SpO2: 94 % O2 Device: Nasal cannula Oxygen Delivery: 7 LPM  Weight: 280 lbs 6.8 oz    General Appearance: Alert, awake and no apparent distress  Respiratory: scattered wheezing  Cardiovascular: regular rate and rhythm  GI: soft and non-tender  Skin: warm and dry      Medical Decision Making       40 MINUTES SPENT BY ME on the date of service doing chart review, history, exam, documentation & further activities per the note.  MANAGEMENT DISCUSSED with the following over the past 24 hours: patient, , RN, care transition team   NOTE(S)/MEDICAL RECORDS REVIEWED over the past 24 hours: pulmonary note  Tests ORDERED & REVIEWED in the past 24 hours:  - BMP  - glucose       Data     I have personally reviewed the following data over the past 24 hrs:    N/A  \   N/A   / N/A     N/A N/A N/A /  85   4.0 N/A N/A \       Imaging results reviewed over the past 24 hrs:   No results found for this or any previous visit (from the past 24 hour(s)).

## 2024-01-09 VITALS
SYSTOLIC BLOOD PRESSURE: 129 MMHG | TEMPERATURE: 97.9 F | WEIGHT: 282.9 LBS | HEIGHT: 67 IN | RESPIRATION RATE: 35 BRPM | DIASTOLIC BLOOD PRESSURE: 86 MMHG | OXYGEN SATURATION: 94 % | HEART RATE: 85 BPM | BODY MASS INDEX: 44.4 KG/M2

## 2024-01-09 LAB
CREAT SERPL-MCNC: 0.6 MG/DL (ref 0.51–0.95)
EGFRCR SERPLBLD CKD-EPI 2021: >90 ML/MIN/1.73M2
GLUCOSE BLDC GLUCOMTR-MCNC: 113 MG/DL (ref 70–99)
GLUCOSE BLDC GLUCOMTR-MCNC: 195 MG/DL (ref 70–99)
GLUCOSE BLDC GLUCOMTR-MCNC: 90 MG/DL (ref 70–99)
MAGNESIUM SERPL-MCNC: 2.2 MG/DL (ref 1.7–2.3)
PLATELET # BLD AUTO: 163 10E3/UL (ref 150–450)
POTASSIUM SERPL-SCNC: 3.7 MMOL/L (ref 3.4–5.3)

## 2024-01-09 PROCEDURE — 36415 COLL VENOUS BLD VENIPUNCTURE: CPT | Performed by: INTERNAL MEDICINE

## 2024-01-09 PROCEDURE — 84132 ASSAY OF SERUM POTASSIUM: CPT | Performed by: INTERNAL MEDICINE

## 2024-01-09 PROCEDURE — 250N000013 HC RX MED GY IP 250 OP 250 PS 637: Performed by: HOSPITALIST

## 2024-01-09 PROCEDURE — 99233 SBSQ HOSP IP/OBS HIGH 50: CPT | Performed by: NURSE PRACTITIONER

## 2024-01-09 PROCEDURE — 250N000013 HC RX MED GY IP 250 OP 250 PS 637: Performed by: INTERNAL MEDICINE

## 2024-01-09 PROCEDURE — 250N000009 HC RX 250: Performed by: INTERNAL MEDICINE

## 2024-01-09 PROCEDURE — 250N000011 HC RX IP 250 OP 636: Performed by: INTERNAL MEDICINE

## 2024-01-09 PROCEDURE — 999N000157 HC STATISTIC RCP TIME EA 10 MIN

## 2024-01-09 PROCEDURE — 85049 AUTOMATED PLATELET COUNT: CPT | Performed by: INTERNAL MEDICINE

## 2024-01-09 PROCEDURE — 83735 ASSAY OF MAGNESIUM: CPT | Performed by: INTERNAL MEDICINE

## 2024-01-09 PROCEDURE — 94640 AIRWAY INHALATION TREATMENT: CPT | Mod: 76

## 2024-01-09 PROCEDURE — 82565 ASSAY OF CREATININE: CPT | Performed by: INTERNAL MEDICINE

## 2024-01-09 PROCEDURE — 94640 AIRWAY INHALATION TREATMENT: CPT

## 2024-01-09 PROCEDURE — 99239 HOSP IP/OBS DSCHRG MGMT >30: CPT | Performed by: INTERNAL MEDICINE

## 2024-01-09 PROCEDURE — 250N000012 HC RX MED GY IP 250 OP 636 PS 637: Performed by: HOSPITALIST

## 2024-01-09 RX ORDER — PREDNISONE 10 MG/1
TABLET ORAL
Qty: 17 TABLET | Refills: 0 | Status: SHIPPED | OUTPATIENT
Start: 2024-01-10 | End: 2024-01-21

## 2024-01-09 RX ORDER — IPRATROPIUM BROMIDE AND ALBUTEROL SULFATE 2.5; .5 MG/3ML; MG/3ML
1 SOLUTION RESPIRATORY (INHALATION) 4 TIMES DAILY
Qty: 90 ML | Refills: 0 | Status: SHIPPED | OUTPATIENT
Start: 2024-01-09

## 2024-01-09 RX ORDER — ALBUTEROL SULFATE 90 UG/1
2 AEROSOL, METERED RESPIRATORY (INHALATION) EVERY 4 HOURS PRN
Start: 2024-01-09

## 2024-01-09 RX ADMIN — ENOXAPARIN SODIUM 40 MG: 40 INJECTION SUBCUTANEOUS at 12:56

## 2024-01-09 RX ADMIN — IPRATROPIUM BROMIDE AND ALBUTEROL SULFATE 3 ML: .5; 3 SOLUTION RESPIRATORY (INHALATION) at 12:18

## 2024-01-09 RX ADMIN — FLUTICASONE FUROATE AND VILANTEROL TRIFENATATE 1 PUFF: 200; 25 POWDER RESPIRATORY (INHALATION) at 08:59

## 2024-01-09 RX ADMIN — PANTOPRAZOLE SODIUM 40 MG: 40 TABLET, DELAYED RELEASE ORAL at 08:54

## 2024-01-09 RX ADMIN — ENOXAPARIN SODIUM 40 MG: 40 INJECTION SUBCUTANEOUS at 01:08

## 2024-01-09 RX ADMIN — IPRATROPIUM BROMIDE AND ALBUTEROL SULFATE 3 ML: .5; 3 SOLUTION RESPIRATORY (INHALATION) at 15:37

## 2024-01-09 RX ADMIN — IRBESARTAN 300 MG: 150 TABLET ORAL at 08:53

## 2024-01-09 RX ADMIN — PREDNISONE 30 MG: 20 TABLET ORAL at 08:52

## 2024-01-09 RX ADMIN — FUROSEMIDE 40 MG: 40 TABLET ORAL at 08:54

## 2024-01-09 RX ADMIN — DILTIAZEM HYDROCHLORIDE 300 MG: 300 CAPSULE, EXTENDED RELEASE ORAL at 08:53

## 2024-01-09 RX ADMIN — IPRATROPIUM BROMIDE AND ALBUTEROL SULFATE 3 ML: .5; 3 SOLUTION RESPIRATORY (INHALATION) at 07:57

## 2024-01-09 ASSESSMENT — ACTIVITIES OF DAILY LIVING (ADL)
ADLS_ACUITY_SCORE: 47
ADLS_ACUITY_SCORE: 47
ADLS_ACUITY_SCORE: 48
ADLS_ACUITY_SCORE: 47

## 2024-01-09 NOTE — PROGRESS NOTES
Palliative Care Daily Progress Note  [facility]     Patient Name: Mary E Weiland  Date of Admission: 1/3/2024   Today the patient was seen for: Goals of care     Recommendations & Counseling       GOALS OF CARE:  Restorative with limits   Pt affirms DNR/DNI  Peyton's main goal is to return home, and she is hoping to discharge home today, as she feels that there is nothing we are doing in the hospital that she cannot do at home.  She has significant support from her family and friends in the home setting.  Her respiratory status is largely at her baseline with 6 L of oxygen at rest, 10 L with activity.  She is deconditioned, however, and this is attributing to her limited respiratory capacity  We discussed hospice as an option moving forward.  Peyton is open to this in the future, however, she does not feel that she is ready for hospice care at this time.  She wants to consider future rehospitalizations if there is a reversible illness that can be addressed to extend time to live  I further discussed this with her daughter Jennifer via phone.  She is supportive of Peyton's plan.  We acknowledge with future hospitalizations, we potentially run the risk of losing an opportunity to return home for her dying process.  It will be a fine balance of determining when the right time for hospice may be appropriate  Jennifer believes it may be helpful to continue these conversations with outpatient palliative care.  Will place referral for Peyton to establish care with our team    ADVANCE CARE PLANNING:  No health care directive on file. Per  informed consent policy, next of kin should be involved if patient becomes unable.  There is no POLST form on file, defer to patient and/or next of kin for decisions   Code status: No CPR- Do NOT Intubate    MEDICAL MANAGEMENT:  We are not actively managing symptoms at this time.  As above, respiratory status largely at her baseline.  Although deconditioning is  contributing.    PSYCHOSOCIAL/SPIRITUAL:  Family -supportive  Al.  2 adult children, Tony and Jennifer, live out of state but currently visiting and helping out.  Very strong support from family and Restorationist community.    Palliative Care will continue to follow. Thank you for the consult and allowing us to aid in the care of Mary E Weiland.    These recommendations have been discussed with bedside RN, Dr. Mensah.    Chart documentation was completed, in part, with [employer] voice-recognition software. Even though reviewed, some grammatical, spelling, and word errors may remain.    ALAN Amezcua CNP  Securely message with [employer] (more info)  Text page via [facility] Paging/Directory         Assessments           Mary E Weiland is a 68 year old female with a past medical history significant for chronic idiopathic pulmonary fibrosis oxygen support 5 L at rest, 10 L with activity, pulmonary hypertension, chronic lymphedema, morbid obesity, DM2, and GERD who presents with shortness of breath.  She is found to have acute on chronic hypoxic respiratory failure secondary to RSV.  She initially required BiPAP support, but has weaned down to her baseline oxygen needs.     Prognosis, Goals, & Planning:   Prognosis, Goals, and/or Advance Care Planning addressed today:  Restorative with limits   Pt affirms DNR/DNI  Peyton's main goal is to return home, and she is hoping to discharge home today, as she feels that there is nothing we are doing in the hospital that she cannot do at home.  She has significant support from her family and friends in the home setting.  Her respiratory status is largely at her baseline with 6 L of oxygen at rest, 10 L with activity.  She is deconditioned, however, and this is attributing to her limited respiratory capacity  We discussed hospice as an option moving forward.  Peyton is open to this in the future, however, she does not feel that she is ready for hospice care at this time.  She wants to  consider future rehospitalizations if there is a reversible illness that can be addressed to extend time to live  I further discussed this with her daughter Jennifer via phone.  She is supportive of Peyton's plan.  We acknowledge with future hospitalizations, we potentially run the risk of losing an opportunity to return home for her dying process.  It will be a fine balance of determining when the right time for hospice may be appropriate  Jennifer believes it may be helpful to continue these conversations with outpatient palliative care.  Will place referral for Peyton to establish care with our team    Code Status was addressed today:   Yes, affirmed DNR/DNI by patient today    Coping, Meaning, & Spirituality:   Mood, coping, and/or meaning in the context of serious illness were addressed today: Yes.  Note significant support from her Taskforce community.         Interval History:     Chart review/discussion with unit or clinical team members:   Weaned from BiPAP support and now on 6 L at rest.    Per patient or family/caregivers today:  Breathing feels better.  Able to have long conversation with us today with no desaturations.  Some tachypnea noted with talking, but did remarkably well.  Really wants to go home today.         Review of Systems:     Besides above, an additional N/A system ROS was reviewed and is unremarkable          Physical Exam:   Temp:  [97.6  F (36.4  C)-98.1  F (36.7  C)] 97.9  F (36.6  C)  Pulse:  [] 82  Resp:  [13-32] 29  BP: (111-136)/(67-91) 120/72  FiO2 (%):  [35 %] 35 %  SpO2:  [87 %-96 %] 93 %  282 lbs 14.4 oz  CONSTITUTIONAL: Chronically ill woman seen sitting up in the chair in primarily NAD, some tachypnea with talking.  A&Ox3. Calm and cooperative.  HEENT: NCAT  RESPIRATORY: NL respiratory effort on 6 L Oxymizer  NEUROLOGIC: Appropriately responsive during interview  PSYCH: Affect engaged, congruent, appropriately tearful at times during the discussion           Current Problem List:    Principal Problem:    Acute on chronic respiratory failure with hypoxemia (H)      Allergies   Allergen Reactions    No Known Allergies             Medications:   Diltiazem  Breo Ellipta inhaler  Lasix 40 mg IV twice daily  Insulin  DuoNebs  Avapro  Prednisone taper  PPI  Dilaudid 0.2 to 0.4 mg IV every 2 hours as needed pain  Ativan 0.5 mg IV every 6 hours as needed anxiety  Oxycodone 2.5 to 5 mg p.o. every 4 hours as needed pain         Data Reviewed:   Recent imaging independently reviewed, my comments on pertinents:   1/3 EKG with no Qtc prolongation     Recent lab data independently reviewed, my comments on pertinents:   Na 140  K 3.7  Creat 0.6  Mg 2.2  WBC 11.4  Hgb 13.4  Plt 163    Medical Decision Making   Please see A&P for additional details of medical decision making.  MANAGEMENT DISCUSSED with the following over the past 24 hours: Dr. Mensah, bedside RN   NOTE(S)/MEDICAL RECORDS REVIEWED over the past 24 hours: Hospitalist notes, nursing notes  Tests personally interpreted in the past 24 hours:  - EKG tracing showing no Qtc prolongation   Tests REVIEWED in the past 24 hours:  - See lab/imaging results included in the data section of the note  - BMP  - CBC  - Magnesium  SUPPLEMENTAL HISTORY, in addition to the patient's history, over the past 24 hours obtained from:   - Dr. Mensah, bedside RN, dtr Jennifer  Medical complexity over the past 24 hours:  - Decision to DE-ESCALATE CARE based on prognosis

## 2024-01-09 NOTE — PROGRESS NOTES
Palliative Care Initial Social Work Note  Location: [facility]    Patient Info:  Mary E Weiland is a 68 year old female with PMH including chronic corpulmonale, pulmonary hypertension, idiopathic pulmonary fibrosis, obstructive sleep apnea admitted on 1/3/24 with SOB, chronic respiratory failure, positive for RSV.     Brief summary of visit: Follow up co-visit today with Saranya Parra NP.  Peyton was up in chair on nasal canula. She stated her goal is to get out of the hospital to her home as soon as possible, preferably today.  She feels she will be more comfortable at home and more able to rest and focus on her recovery.  We discussed enlisting the support of a hospice team but Peyton did not feel that was necessary at this point in time.  She hopes to recover at least some of her lung function, and if/when there is a change in her condition she would want to be rehospitalized to treat anything reversible.  SHe is clear that she would not want intubation or CPR.       Date of Admission: 1/3/2024    Reason for consult: Goals of care  Patient and family support    Sources of information: Patient    Recommendations & Plan:  Hoping to discharge today.        Symptoms & Concerns Addressed Today:  Goals of care    Strengths Identified:    Open to visit, able to contemplate and advocate for her needs.     Relationships & Support:  Aspects of relationships and support assessed today:  Identified family members: Daughter, son  Professional supports:   Family coping: no concerns  Bereavement Risk concerns: no concerns    Coping, Mental Health & Adjustment to Illness:   Adjustment to Illness/Hospitalization    Goals, Decision Making & Advance Care Planning:   Prognosis, Goals, and/or Advance Care Planning were assessed today: Yes  If yes, brief summary of discussion: goal is to return home.  Restorative with limits in place of DNR/DNI.  Does want to be re hospitalized for reversible conditions  Preferred language:  English  Patient's decision making preferences: shared with support from loved ones  I have concerns about the patient/family's health literacy today: No  Patient has a completed Health Care Directive: No.   Code status per chart review: No CPR / No Intubation      Clinical Social Work Interventions:   Assessment of palliative specific issues    Introduction of Palliative clinical social work interventions  Facilitation of processing of thoughts/feelings  Goals of care discussion/facilitation      CURTIS Valera, [facility]   Palliative Care Clinical   Ph: [phone]

## 2024-01-09 NOTE — DISCHARGE SUMMARY
"[facility]  Hospitalist Discharge Summary      Date of Admission:  1/3/2024  Date of Discharge:  1/9/2024  Discharging Provider: Melanie Mensah MD  Discharge Service: Hospitalist Service    Discharge Diagnoses   See below    Clinically Significant Risk Factors     # DMII: A1C = 6.8 % (Ref range: <5.7 %) within past 6 months    # Severe Obesity: Estimated body mass index is 44.31 kg/m  as calculated from the following:    Height as of this encounter: 1.702 m (5' 7\").    Weight as of this encounter: 128.3 kg (282 lb 14.4 oz).       Follow-ups Needed After Discharge   Follow-up Appointments     Follow-up and recommended labs and tests       1. Follow up with primary care provider, Dimitri Ludwig, within 7   days for hospital follow- up.  No follow up labs or test are needed.    2. Follow up with your pulmonologist in 1-2 weeks.            Unresulted Labs Ordered in the Past 30 Days of this Admission       No orders found from 12/4/2023 to 1/4/2024.            Discharge Disposition   Discharged to home  Condition at discharge: Stable    Hospital Course   Mary E Weiland is a 68 year old female with past medical history including chronic cor pulmonale, pulmonary hypertension, idiopathic pulmonary fibrosis, obstructive sleep apnea, and morbid obesity who is admitted on  1/3/2024 with shortness of breath, cough, acute on chronic respiratory failure, and (+) RSV.     Acute on chronic hypoxic and hypercapnic respiratory failure  (+) Respiratory syncytial virus (RSV) infection 1/3/24, abnormal CT chest  History of obstructive sleep apnea, at bedtime BIPAP use  Chronic respiratory failure, on home oxygen-5L/min at rest and 10L/min w/activity at baseline  Pulmonary hypertension, history of chronic cor pulmonale  Idiopathic pulmonary fibrosis  *presented with worsening shortness of breath. +RSV. Initiated on Solumedrol IV and Bipap on admission.   *transition to PO Prednisone taper on 1/6 " per pulm recs  - she is currently using ~6L at rest and does require increased in oxygen with activity/time to recover. She states she is currently about her baseline with rest, but desats with minimal activity. Overall, she feels improved. She is requesting that she would like to discharge home. She states she has all the equipments at home including bedside commode and all her supplies.   - she has met with palliative care and not yet ready to enroll in hospice. Outpatient referral per palliative care for ongoing discussion.  - continue Prednisone taper to complete course (Prednisone taper:currently 30mg x2 more days days starting 1/10,then 20mg x3 days, then 10mg x3 days,  then 5mg x3 days)   - Continue prior to admission Breo Ellipta 200-25  -will continue DuoNebs 4 times daily at discharge. Neb machine prescribed  -continue prn inhaler at home  - continue PTA supplemental oxygen and hs BiPAP      Nebulizer Documentation  I attest that I have seen and evaluated Mary E Weiland. She has a diagnosis of J20.8 - Acute bronchitis due to other specified organisms, RSV infection and a nebulizer machine is needed to administer medication to improve breathing passages.       Type II diabetes mellitus, A1c 6.8% 10/12/23  Stress-induced hyperglycemia due to infection and corticosteroids  *not on meds PTA      Chronic lymphedema  Morbid obesity, recent Body mass index is 43.54 kg/m .  - f/up as outpatient    Essential hypertension  Prior to admission on extended release diltiazem, irbesartan, with daily furosemide.    - Continue extended release diltiazem and irbesartan.    - IV furosemide ordered on admission, transition to PTA oral furosemide 1/7.      Gastroesophageal reflux disease (GERD)  - continue PO PPI daily    Osteopenia-noted      Deconditioning  - PT recommends home PT. Patient does not think PT would be any benefit to her at this time.     Also discussed with patient's daughter, Jennifer, by phone.     Consultations  This Hospital Stay   PULMONARY IP CONSULT  PALLIATIVE CARE ADULT IP CONSULT  PULMONARY IP CONSULT  PHYSICAL THERAPY ADULT IP CONSULT  OCCUPATIONAL THERAPY ADULT IP CONSULT  CARE MANAGEMENT / SOCIAL WORK IP CONSULT  PHARMACY IP CONSULT    Code Status   No CPR- Do NOT Intubate    Time Spent on this Encounter   I, Melanie Mensah MD, personally saw the patient today and spent 50 minutes discharging this patient.       Melanie Mensah MD  [place] [address] [place] [address]  Phone: [phone]  ______________________________________________________________________    Physical Exam   Vital Signs: Temp: 97.9  F (36.6  C) Temp src: Oral BP: 120/72 Pulse: 82   Resp: 29 SpO2: 93 % O2 Device: Oxymizer cannula Oxygen Delivery: 8 LPM  Weight: 282 lbs 14.4 oz  General Appearance: Alert, awake, no apparent distress  Respiratory: scattered expiratory wheezing  Cardiovascular: regular rate and rhythm  GI: soft and non-tender    Primary Care Physician   Dimitri Ludwig    Discharge Orders      Reason for your hospital stay    You were admitted to the hospital for RSV infection.     Follow-up and recommended labs and tests     1. Follow up with primary care provider, Dimitri Ludwig, within 7 days for hospital follow- up.  No follow up labs or test are needed.    2. Follow up with your pulmonologist in 1-2 weeks.     Activity    Your activity upon discharge: activity as tolerated     Nebulizer and Nebulizer Supplies    Nebulizer Documentation  I attest that I have seen and evaluated Mary E Weiland. She has a diagnosis of J20.8 - Acute bronchitis due to other specified organisms, RSV infection and a nebulizer machine is needed to administer medication to improve breathing passages.     I, the undersigned, certify that the above prescribed supplies are medically necessary for this patient and is both reasonable and necessary in reference to accepted standards of  medical and necessary in reference to accepted standards of medical practice in the treatment of this patient's condition and is not prescribed as a convenience.     Diet    Follow this diet upon discharge: Orders Placed This Encounter      Regular Diet Adult       Significant Results and Procedures   Most Recent 3 CBC's:  Recent Labs   Lab Test 01/09/24  0510 01/06/24  0653 01/05/24  0625 01/04/24  0519 01/03/24  1448   WBC  --   --  11.4* 10.2 10.1   HGB  --   --  13.4 13.3 14.8  15.3   MCV  --   --  95 94 94    151 183 170 165     Most Recent 3 BMP's:  Recent Labs   Lab Test 01/09/24  1254 01/09/24  0836 01/09/24  0510 01/09/24  0223 01/08/24  0759 01/08/24  0601 01/07/24  1537 01/07/24  1304 01/07/24  0804 01/07/24  0523 01/06/24  0833 01/06/24  0653 01/05/24  0803 01/05/24  0625 01/04/24  0557 01/04/24  0519   NA  --   --   --   --   --   --   --   --   --  140  --   --   --  144  --  138   POTASSIUM  --   --  3.7  --   --  4.0  --  4.4  --  3.4  --  3.5  --  3.9  --  4.0   CHLORIDE  --   --   --   --   --   --   --   --   --  98  --   --   --  102  --  99   CO2  --   --   --   --   --   --   --   --   --  34*  --   --   --  34*  --  30*   BUN  --   --   --   --   --   --   --   --   --  23.2*  --   --   --  35.6*  --  22.0   CR  --   --  0.60  --   --   --   --   --   --  0.60  --  0.66  --  0.78  --  0.70   ANIONGAP  --   --   --   --   --   --   --   --   --  8  --   --   --  8  --  9   GEOVANNY  --   --   --   --   --   --   --   --   --  9.3  --   --   --  9.4  --  9.2   * 90  --  113*   < >  --    < >  --    < > 93   < >  --    < > 117*   < > 202*    < > = values in this interval not displayed.   ,   Results for orders placed or performed during the hospital encounter of 01/03/24   XR Chest Port 1 View    Narrative    XR CHEST PORT 1 VIEW 1/3/2024 3:00 PM    HISTORY: sob    COMPARISON: 6/27/2023        Impression    IMPRESSION: Multifocal interstitial and patchy opacities in the lungs  likely  due to pulmonary edema or atypical pneumonia. No pleural  effusion or pneumothorax. Mild cardiomegaly.    KEESHA TRISTAN MD         SYSTEM ID:  [id number]   CT Chest Pulmonary Embolism w Contrast    Narrative    EXAM: CT CHEST PULMONARY EMBOLISM W CONTRAST  LOCATION: [facility]  DATE: 1/3/2024    INDICATION: Dyspnea, hypoxia.  COMPARISON: PET CT 06/27/2023.  TECHNIQUE: CT chest pulmonary angiogram during arterial phase injection of IV contrast. Multiplanar reformats and MIP reconstructions were performed. Dose reduction techniques were used.   CONTRAST: 77 mL Isovue 370.    FINDINGS:  ANGIOGRAM CHEST: The central pulmonary artery is enlarged measuring up to 3.9 cm in greatest radial dimension which is nonspecific, but most typical for pulmonary arterial hypertension. Thoracic aorta is negative for dissection. No CT evidence of right   heart strain. No evidence for pulmonary emboli.    LUNGS AND PLEURA: Similar findings to prior PET CT 06/27/2023 with again seen advanced intermixed interstitial and groundglass infiltrates seen in both lungs with some areas of localized sparing. Mild findings of mosaic perfusion which is nonspecific,   but most typical for air trapping commonly associated with small airway inflammation.    MEDIASTINUM/AXILLAE: Advanced mediastinal/hilar adenopathy which has mildly increased since 06/25/2023. This is presumed to be reactive in nature given the lung infiltrates. Small esophageal hiatal hernia.    CORONARY ARTERY CALCIFICATION: No significant.    UPPER ABDOMEN: Normal.    MUSCULOSKELETAL: Mild to moderate scattered hypertrophic changes most marked in the mid/lower portions of the thoracic spine.      Impression    IMPRESSION:  1.  No PE or dissection.    2.  3.9 cm in greatest radial dimension enlargement of the main pulmonary artery which can be associated with pulmonary arterial hypertension.    3.  Advanced scattered bilateral groundglass and interstitial lung  infiltrates which are nonspecific, but may be inflammatory in nature with presumed significant mediastinal/hilar adenopathy.    4.  Findings of mosaic perfusion in both lungs which is nonspecific, however this is most typically associated with changes of air trapping secondary to small airway inflammation.       Discharge Medications   Current Discharge Medication List        START taking these medications    Details   ipratropium - albuterol 0.5 mg/2.5 mg/3 mL (DUONEB) 0.5-2.5 (3) MG/3ML neb solution Take 1 vial (3 mLs) by nebulization 4 times daily  Qty: 90 mL, Refills: 0    Comments: Future refills by PCP Dr. Dimitri Ludwig with phone number [phone].  Associated Diagnoses: Acute on chronic respiratory failure with hypoxemia (H); Acute bronchitis due to respiratory syncytial virus (RSV)      predniSONE (DELTASONE) 10 MG tablet Take 3 tablets (30 mg) by mouth daily for 2 days, THEN 2 tablets (20 mg) daily for 3 days, THEN 1 tablet (10 mg) daily for 3 days, THEN 0.5 tablets (5 mg) daily for 3 days.  Qty: 17 tablet, Refills: 0    Associated Diagnoses: Acute on chronic respiratory failure with hypoxemia (H); Acute bronchitis due to respiratory syncytial virus (RSV)           CONTINUE these medications which have CHANGED    Details   albuterol (PROAIR HFA/PROVENTIL HFA/VENTOLIN HFA) 108 (90 Base) MCG/ACT inhaler Inhale 2 puffs into the lungs every 4 hours as needed for shortness of breath, wheezing or cough Use the inhaler as needed while using the nebulized treatment 4x/day    Comments: Pharmacy may dispense brand covered by insurance (Proair, or proventil or ventolin or generic albuterol inhaler)  Associated Diagnoses: IPF (idiopathic pulmonary fibrosis) (H)           CONTINUE these medications which have NOT CHANGED    Details   diltiazem ER COATED BEADS (CARDIZEM CD/CARTIA XT) 300 MG 24 hr capsule Take 1 capsule (300 mg) by mouth daily  Qty: 90 capsule, Refills: 3    Associated Diagnoses: Benign  essential hypertension      fluticasone-vilanterol (BREO ELLIPTA) 200-25 MCG/INH inhaler Inhale 1 puff into the lungs daily  Qty:        furosemide (LASIX) 40 MG tablet Take 1 tablet (40 mg) by mouth daily  Qty: 90 tablet, Refills: 3    Associated Diagnoses: Localized edema      irbesartan (AVAPRO) 300 MG tablet TAKE 1 TABLET(300 MG) BY MOUTH DAILY  Qty: 90 tablet, Refills: 0    Associated Diagnoses: Benign essential hypertension      omeprazole (PRILOSEC) 40 MG DR capsule Take 1 capsule (40 mg) by mouth daily  Qty:        pirfenidone (ESBRIET) 267 MG capsule First week: one twice daily, one capsule 3 times per day for day 7-14, 2 capsules 3 times per day on day 15-30, then 3 capsules 3 times per day after day 30.      potassium chloride ER (K-TAB) 20 MEQ CR tablet Take 1 tablet (20 mEq) by mouth daily  Qty: 90 tablet, Refills: 3    Associated Diagnoses: Localized edema           Allergies   Allergies   Allergen Reactions    No Known Allergies

## 2024-01-09 NOTE — PLAN OF CARE
Pt alert and oriented x4. Pt denies pain. Pt on 6L-10L oximizer which is baseline for pt. Pt up independently. Desats with activity. Pt tolerating regular diet. Pt voiding well. Pt given discharge instructions and states understanding all discharge instructions including medication indications, dosing, timing, and side effects and all follow up care. Pt states ready for discharge and pt accompanied to car on 10 liters oxygen by RN and placed on 10 liters oxygen in car. Pt stated comfort in breathing and with  and son.

## 2024-01-09 NOTE — PLAN OF CARE
Pt alert and oriented x4. Pt up to chair with sba frequently during the day. Pt denies pain. Pt desats to low 80s with activity on 10 liters oximizer. Pt on 6 liters nasal cannula at rest. Pt voiding well on commode. Eating well. Pt in sinus rhythm/tachycardia. Pt updated on plan of care.

## 2024-01-09 NOTE — PLAN OF CARE
A+Ox4 VSS on Bipap 35% FiO2 overnight. Lung sounds have crackles and expiratory wheezes. Dyspneic on exertion. Tele NSR. Bowels active, flatus+, BM+. Voiding adequately. Up SBA. NPO while on bipap.

## 2024-01-11 ENCOUNTER — PATIENT OUTREACH (OUTPATIENT)
Dept: CARE COORDINATION | Facility: CLINIC | Age: 69
End: 2024-01-11
Payer: COMMERCIAL

## 2024-01-11 NOTE — PROGRESS NOTES
Clinic Care Coordination Contact  [facility]: Post-Discharge Note  SITUATION                                                      Admission:    Admission Date: 01/03/24   Reason for Admission: Acute on chronic respiratory failure with hypoxemia  Discharge:   Discharge Date: 01/09/24  Discharge Diagnosis: Acute on chronic respiratory failure with hypoxemia    BACKGROUND                                                      Mary E Weiland is a 68 year old female with past medical history including chronic cor pulmonale, pulmonary hypertension, idiopathic pulmonary fibrosis, obstructive sleep apnea, and morbid obesity who is admitted on  1/3/2024 with shortness of breath, cough, acute on chronic respiratory failure, and (+) RSV.      Acute on chronic hypoxic and hypercapnic respiratory failure  (+) Respiratory syncytial virus (RSV) infection 1/3/24, abnormal CT chest  History of obstructive sleep apnea, at bedtime BIPAP use  Chronic respiratory failure, on home oxygen-5L/min at rest and 10L/min w/activity at baseline  Pulmonary hypertension, history of chronic cor pulmonale  Idiopathic pulmonary fibrosis  *presented with worsening shortness of breath. +RSV. Initiated on Solumedrol IV and Bipap on admission.   *transition to PO Prednisone taper on 1/6 per pulm recs  - she is currently using ~6L at rest and does require increased in oxygen with activity/time to recover. She states she is currently about her baseline with rest, but desats with minimal activity. Overall, she feels improved. She is requesting that she would like to discharge home. She states she has all the equipments at home including bedside commode and all her supplies.   - she has met with palliative care and not yet ready to enroll in hospice. Outpatient referral per palliative care for ongoing discussion.  - continue Prednisone taper to complete course (Prednisone taper:currently 30mg x2 more days days starting 1/10,then 20mg x3 days, then 10mg  x3 days,  then 5mg x3 days)   - Continue prior to admission Breo Ellipta 200-25  -will continue DuoNebs 4 times daily at discharge. Neb machine prescribed  -continue prn inhaler at home  - continue PTA supplemental oxygen and hs BiPAP        Nebulizer Documentation  I attest that I have seen and evaluated Mary E Weiland. She has a diagnosis of J20.8 - Acute bronchitis due to other specified organisms, RSV infection and a nebulizer machine is needed to administer medication to improve breathing passages.         Type II diabetes mellitus, A1c 6.8% 10/12/23  Stress-induced hyperglycemia due to infection and corticosteroids  *not on meds PTA        Chronic lymphedema  Morbid obesity, recent Body mass index is 43.54 kg/m .  - f/up as outpatient     Essential hypertension  Prior to admission on extended release diltiazem, irbesartan, with daily furosemide.    - Continue extended release diltiazem and irbesartan.    - IV furosemide ordered on admission, transition to PTA oral furosemide 1/7.       Gastroesophageal reflux disease (GERD)  - continue PO PPI daily     Osteopenia-noted        Deconditioning  - PT recommends home PT. Patient does not think PT would be any benefit to her at this time.      Also discussed with patient's daughter, Jennifer, by phone.            Consultations This Hospital Stay   PULMONARY IP CONSULT  PALLIATIVE CARE ADULT IP CONSULT  PULMONARY IP CONSULT  PHYSICAL THERAPY ADULT IP CONSULT  OCCUPATIONAL THERAPY ADULT IP CONSULT  CARE MANAGEMENT / SOCIAL WORK IP CONSULT  PHARMACY IP CONSULT           Code Status   No CPR- Do NOT Intubate           Time Spent on this Encounter   I, Melanie Mensah MD, personally saw the patient today and spent 50 minutes discharging this patient.     Melanie Mensah MD  [place] [address] [address] [place] [address]  Phone: [phone]  ______________________________________________________________________            Physical Exam  Vital Signs: Temp: 97.9  F (36.6  C) Temp src: Oral BP: 120/72 Pulse: 82   Resp: 29 SpO2: 93 % O2 Device: Oxymizer cannula Oxygen Delivery: 8 LPM  Weight: 282 lbs 14.4 oz  General Appearance:  Alert, awake, no apparent distress  Respiratory: scattered expiratory wheezing  Cardiovascular: regular rate and rhythm  GI: soft and non-tender       ASSESSMENT           Discharge Assessment  How are you doing now that you are home?: Patient shares that she is doing well, has just been resting and trying to feel better. Patient plans to follow up with PCP and pulmonologist and plans to make these appt's today, however, they will most likely be virtual. Writer did want to make sure her her hospital records were sent to pulm MD Maria Del Rosario [facility]ling. She was told in the hospital that they would be, but wanted to make sure. Writer offers to reach out and check on this this. Patient did give verbal auth to fax if needed. No other questions/needs or concerns at this time.  How are your symptoms? (Red Flag symptoms escalate to triage hotline per guidelines): Improved  Do you feel your condition is stable enough to be safe at home until your provider visit?: Yes  Does the patient have their discharge instructions? : Yes  Does the patient have questions regarding their discharge instructions? : No  Were you started on any new medications or were there changes to any of your previous medications? : Yes  Does the patient have all of their medications?: Yes  Do you have questions regarding any of your medications? : No  Do you have all of your needed medical supplies or equipment (DME)?  (i.e. oxygen tank, CPAP, cane, etc.): Yes  Discharge follow-up appointment scheduled within 14 calendar days? : No  Is patient agreeable to assistance with scheduling? : No (Patient would like to schedule these on her own)    Post-op ([facility]A Only)  If the patient had a surgery or procedure, do they have any questions for a nurse?:  No    Post-op (Clinicians Only)  Did the patient have surgery or a procedure: No        PLAN                                                      Outpatient Plan:  Follow-up Appointments     Follow-up and recommended labs and tests       1. Follow up with primary care provider, Dimitri Ludwig, within 7   days for hospital follow- up.  No follow up labs or test are needed.    2. Follow up with your pulmonologist in 1-2 weeks    Future Appointments   Date Time Provider Department Center   4/15/2024 11:30 AM Dimitri Ludwig MD [facility]         For any urgent concerns, please contact our 24 hour nurse triage line: [phone] ([phone])         HARLAN Cotter

## 2024-01-12 ENCOUNTER — TELEPHONE (OUTPATIENT)
Dept: INTERNAL MEDICINE | Facility: CLINIC | Age: 69
End: 2024-01-12
Payer: COMMERCIAL

## 2024-01-12 NOTE — TELEPHONE ENCOUNTER
Patient calling, she was discharged from [facility] on 1/9/2024 and needs a hospital follow up. She reports that her energy level maxes out at standing to get out of bed and does not wish to come into clinic - can she be worked in with a telephone or video visit for this?

## 2024-01-12 NOTE — TELEPHONE ENCOUNTER
Call to patient to get appointment set up.    Appointments in Next Year      Jan 22, 2024  2:00 PM  (Arrive by 1:55 PM)  Provider Visit with iDmitri Ludwig MD  [facility] ([facility] ) [phone]     Thank you,  Dwight Phelps, Triage RN [facility]  11:30 AM 1/12/2024

## 2024-01-16 ENCOUNTER — PATIENT OUTREACH (OUTPATIENT)
Dept: CARE COORDINATION | Facility: CLINIC | Age: 69
End: 2024-01-16
Payer: COMMERCIAL

## 2024-01-16 NOTE — PROGRESS NOTES
Clinical Product Navigator reviewed chart; patient on payer product coverage.  Review results:   CPN Initial Information Gathering  Referral Source: Health Plan  Referrals Places: Care Coordination    Patient identified by health plan for care management support with high probability risk of admission. Upon chart review hospice referral has been ordered.     Not met any any referral criteria at this time.  Will monitor for future needs    HARLAN Yee  Social Work Care Coordinator   Clinical Product Navigation

## 2024-01-22 ENCOUNTER — VIRTUAL VISIT (OUTPATIENT)
Dept: INTERNAL MEDICINE | Facility: CLINIC | Age: 69
End: 2024-01-22
Payer: COMMERCIAL

## 2024-01-22 DIAGNOSIS — J96.21 ACUTE ON CHRONIC RESPIRATORY FAILURE WITH HYPOXEMIA (H): Primary | ICD-10-CM

## 2024-01-22 DIAGNOSIS — B33.8 RSV INFECTION: ICD-10-CM

## 2024-01-22 DIAGNOSIS — J84.112 IPF (IDIOPATHIC PULMONARY FIBROSIS) (H): ICD-10-CM

## 2024-01-22 PROCEDURE — 99495 TRANSJ CARE MGMT MOD F2F 14D: CPT | Mod: 95 | Performed by: INTERNAL MEDICINE

## 2024-01-22 RX ORDER — AZITHROMYCIN 250 MG/1
TABLET, FILM COATED ORAL
Qty: 6 TABLET | Refills: 0 | Status: SHIPPED | OUTPATIENT
Start: 2024-01-22 | End: 2024-01-27

## 2024-01-22 ASSESSMENT — PATIENT HEALTH QUESTIONNAIRE - PHQ9
10. IF YOU CHECKED OFF ANY PROBLEMS, HOW DIFFICULT HAVE THESE PROBLEMS MADE IT FOR YOU TO DO YOUR WORK, TAKE CARE OF THINGS AT HOME, OR GET ALONG WITH OTHER PEOPLE: SOMEWHAT DIFFICULT
SUM OF ALL RESPONSES TO PHQ QUESTIONS 1-9: 11
SUM OF ALL RESPONSES TO PHQ QUESTIONS 1-9: 11

## 2024-01-22 NOTE — PROGRESS NOTES
Answers submitted by the patient for this visit:  Patient Health Questionnaire (Submitted on 1/22/2024)  If you checked off any problems, how difficult have these problems made it for you to do your work, take care of things at home, or get along with other people?: Somewhat difficult  PHQ9 TOTAL SCORE: 11  Peyton is a 68 year old who is being evaluated via a billable video visit.      How would you like to obtain your AVS? MyChart  If the video visit is dropped, the invitation should be resent by: Text to cell phone: [phone]  Will anyone else be joining your video visit? No      Assessment & Plan     Acute on chronic respiratory failure with hypoxemia (H) and RSV infection  At this time, patient does appear to be making slow progress back to her baseline status.  It is very reassuring to note that she is no longer reporting frequent cough or wheeze.  Patient also denies any issues with fevers or chills.  She does state that she is back on her baseline of 5 L of oxygen supplementation at rest with 10 L when she is exerting herself.  Patient does have a follow-up appointment with her pulmonologist very soon.  She did recently complete her prednisone taper.  Patient does report some ongoing issues with sinus pressure and congestion, and a prescription for azithromycin 250 mg tablets with show take 2 tabs on day 1 followed by 1 tab mouth once per day for 4 additional days has been submitted to her pharmacy.  We did review side effects associate with this medication.  We also discussed signs and symptoms suggestive of worsening respiratory status.  Patient is aware that should she have any issues he should contact the clinic for additional recommendations or assistance.  - azithromycin (ZITHROMAX) 250 MG tablet; Take 2 tablets (500 mg) by mouth daily for 1 day, THEN 1 tablet (250 mg) daily for 4 days.    IPF (idiopathic pulmonary fibrosis) (H)  Chronic condition.  Followed by pulmonology.    Review of external notes  "as documented elsewhere in note  Prescription drug management  22 minutes spent by me on the date of the encounter doing chart review, history and exam, documentation and further activities per the note    MED REC REQUIRED  Post Medication Reconciliation Status:  Discharge medications reconciled, continue medications without change  BMI  Estimated body mass index is 44.31 kg/m  as calculated from the following:    Height as of 1/4/24: 1.702 m (5' 7\").    Weight as of 1/9/24: 128.3 kg (282 lb 14.4 oz).   Weight management plan: Discussed healthy diet and exercise guidelines      See Patient Instructions    Subjective   Peyton is a 68 year old, presenting for the following health issues:  Hospital F/U    Patient is a 68-year-old  female who participates in a virtual visit for a hospital follow-up visit. Patient was admitted to [facility] on January 3, 2024 after experiencing shortness of breath and cough.  Patient does have a history of obstructive sleep apnea, on BiPAP, chronic respiratory failure and is on home oxygen therapy.  Patient does also have a history of pulmonary hypertension idiopathic pulmonary fibrosis.  While in the hospital, she did test positive for RSV.  Patient was briefly on IV Solu-Medrol, but she was transition to a p.o. prednisone taper on January 6, 2024.  Pulmonology did evaluate the patient while in the hospital.  Patient did progressively improve, and she was deemed stable for discharge on January 9, 2024.  Patient was still requiring approximately 6 L at the time of discharge.  She is typically on 5 L at rest at home.  Patient does use 10 L/min with activity.  Patient does report that she is back on her home oxygen regimen.  She does state that her cough and wheeze have recently subsided.  Patient does feel that she is slowly made progress back to her baseline status.  She does report some issues with ongoing sinus pressure and congestion that have been present for " approximately 2 weeks.  Patient is wondering if she could get an antibiotic for that issue at this time.  Otherwise, patient reports she is doing well.             1/11/2024     8:52 AM   Post Discharge Outreach   Admission Date 1/3/2024   Reason for Admission Acute on chronic respiratory failure with hypoxemia   Discharge Date 1/9/2024   Discharge Diagnosis Acute on chronic respiratory failure with hypoxemia   How are you doing now that you are home? Patient shares that she is doing well, has just been resting and trying to feel better. Patient plans to follow up with PCP and pulmonologist and plans to make these appt's today, however, they will most likely be virtual. Writer did want to make sure her her hospital records were sent to pulm MD Maria Del Rosario [facility]. She was told in the hospital that they would be, but wanted to make sure. Writer offers to reach out and check on this this. Patient did give verbal auth to fax if needed. No other questions/needs or concerns at this time.   How are your symptoms? (Red Flag symptoms escalate to triage hotline per guidelines) Improved   Do you feel your condition is stable enough to be safe at home until your provider visit? Yes   Does the patient have their discharge instructions?  Yes   Does the patient have questions regarding their discharge instructions?  No   Were you started on any new medications or were there changes to any of your previous medications?  Yes   Does the patient have all of their medications? Yes   Do you have questions regarding any of your medications?  No   Do you have all of your needed medical supplies or equipment (DME)?  (i.e. oxygen tank, CPAP, cane, etc.) Yes   Discharge follow-up appointment scheduled within 14 calendar days?  No     Hospital Follow-up Visit:    Hospital/Nursing Home/IP Rehab Facility: [facility]  Date of Admission: 1/3/24  Date of Discharge: 1/9/24  Reason(s) for Admission: Acute on chronic respiratory  failure with hypoxemia     Was your hospitalization related to COVID-19? No   Problems taking medications regularly:  None  Medication changes since discharge: None  Problems adhering to non-medication therapy:  None    Summary of hospitalization:  [facility] discharge summary reviewed  Diagnostic Tests/Treatments reviewed.  Follow up needed: Pulmonology  Other Healthcare Providers Involved in Patient s Care:         Specialist appointment - pulmonology  Update since discharge: improved.     Plan of care communicated with patient        Review of Systems  CONSTITUTIONAL: NEGATIVE for fever, chills, change in weight  ENT/MOUTH: Positive for sinus pressure and congestion.  RESP: Positive for shortness of breath with exertion.  CV: NEGATIVE for chest pain, palpitations or peripheral edema  GI: NEGATIVE for nausea, abdominal pain, heartburn, or change in bowel habits  : NEGATIVE for frequency, dysuria, or hematuria  MUSCULOSKELETAL: NEGATIVE for significant arthralgias or myalgia  NEURO: NEGATIVE for weakness, dizziness or paresthesias        Objective           Vitals:  No vitals were obtained today due to virtual visit.    Physical Exam   GENERAL: alert and noted to breathe heavy with any exertion.  EYES: Eyes grossly normal to inspection.  No discharge or erythema, or obvious scleral/conjunctival abnormalities.  RESP: No audible wheeze, cough, or visible cyanosis.    SKIN: Visible skin clear. No significant rash, abnormal pigmentation or lesions.  NEURO: Cranial nerves grossly intact.  Mentation and speech appropriate for age.  PSYCH: Appropriate affect, tone, and pace of words        Video-Visit Details    Type of service:  Video Visit   Video Start Time:  2:00 PM  Video End Time:2:23 PM    Originating Location (pt. Location): Home  Distant Location (provider location):  On-site  Platform used for Video Visit: S[employer]a  Signed Electronically by: Dimitri Ludwig MD

## 2024-02-29 ENCOUNTER — DOCUMENTATION ONLY (OUTPATIENT)
Dept: INTERNAL MEDICINE | Facility: CLINIC | Age: 69
End: 2024-02-29
Payer: COMMERCIAL

## 2024-02-29 DIAGNOSIS — J84.112 IPF (IDIOPATHIC PULMONARY FIBROSIS) (H): Primary | ICD-10-CM

## 2024-03-16 ENCOUNTER — HEALTH MAINTENANCE LETTER (OUTPATIENT)
Age: 69
End: 2024-03-16

## 2024-04-15 ENCOUNTER — TELEPHONE (OUTPATIENT)
Dept: INTERNAL MEDICINE | Facility: CLINIC | Age: 69
End: 2024-04-15

## 2024-04-15 NOTE — TELEPHONE ENCOUNTER
Reason for Call:  Appointment Request    Patient requesting this type of appt: Chronic Diease Management/Medication/Follow-Up    Requested provider: Dimitri Ludwig    Reason patient unable to be scheduled: Not within requested timeframe    When does patient want to be seen/preferred time: 1-2 days    Comments: Pt states her appt for 4/15 was canceled, she was calling to resched however is hoping to get something this week yet, if possible. She was sched'd for a follow up but also is hoping to have her ear checked.     Could we send this information to you in [email or web address] or would you prefer to receive a phone call?:   Patient would prefer a phone call   Okay to leave a detailed message?: Yes at Home number on file [phone] (home)    Call taken on 4/15/2024 at 9:47 AM by Amalia Eller     used

## 2024-04-16 ENCOUNTER — OFFICE VISIT (OUTPATIENT)
Dept: INTERNAL MEDICINE | Facility: CLINIC | Age: 69
End: 2024-04-16
Payer: COMMERCIAL

## 2024-04-16 VITALS
BODY MASS INDEX: 45.36 KG/M2 | TEMPERATURE: 98.4 F | DIASTOLIC BLOOD PRESSURE: 66 MMHG | OXYGEN SATURATION: 88 % | HEIGHT: 67 IN | SYSTOLIC BLOOD PRESSURE: 101 MMHG | RESPIRATION RATE: 32 BRPM | WEIGHT: 289 LBS | HEART RATE: 86 BPM

## 2024-04-16 DIAGNOSIS — J01.90 ACUTE SINUSITIS WITH SYMPTOMS > 10 DAYS: Primary | ICD-10-CM

## 2024-04-16 DIAGNOSIS — I10 BENIGN ESSENTIAL HYPERTENSION: ICD-10-CM

## 2024-04-16 DIAGNOSIS — J84.112 IPF (IDIOPATHIC PULMONARY FIBROSIS) (H): ICD-10-CM

## 2024-04-16 DIAGNOSIS — H61.21 IMPACTED CERUMEN OF RIGHT EAR: ICD-10-CM

## 2024-04-16 PROCEDURE — 99215 OFFICE O/P EST HI 40 MIN: CPT | Mod: 25

## 2024-04-16 PROCEDURE — 69209 REMOVE IMPACTED EAR WAX UNI: CPT | Mod: RT

## 2024-04-16 RX ORDER — PREDNISONE 20 MG/1
20 TABLET ORAL DAILY
Qty: 3 TABLET | Refills: 0 | Status: SHIPPED | OUTPATIENT
Start: 2024-04-16 | End: 2024-04-19

## 2024-04-16 RX ORDER — RESPIRATORY SYNCYTIAL VIRUS VACCINE 120MCG/0.5
0.5 KIT INTRAMUSCULAR ONCE
Qty: 1 EACH | Refills: 0 | Status: CANCELLED | OUTPATIENT
Start: 2024-04-16 | End: 2024-04-16

## 2024-04-16 RX ORDER — IRBESARTAN 300 MG/1
300 TABLET ORAL DAILY
Qty: 90 TABLET | Refills: 1 | Status: SHIPPED | OUTPATIENT
Start: 2024-04-16 | End: 2024-09-23

## 2024-04-16 ASSESSMENT — PAIN SCALES - GENERAL: PAINLEVEL: MILD PAIN (2)

## 2024-04-16 NOTE — PATIENT INSTRUCTIONS
Ear was to clean out right ear.     Augmentin take once in the AM and once in the evening with food.     Prednisone once daily with food preferably with protein.

## 2024-04-16 NOTE — PROGRESS NOTES
Assessment & Plan     (J01.90) Acute sinusitis with symptoms > 10 days  (primary encounter diagnosis)  Comment: Patient presents to the clinic for a chief complaint of nasal pressure and drainage for the last few months. She was put on an antibiotic back in January with some relief but return of symptoms. Clinical picture suggest acute sinus infection. Will prescribe a short course of low dose steroid to help with the reduction of swelling as well as an oral antibiotic. Patient and  agree with the plan. Advised to take the medication with food and to take the prednisone in the AM with protein to avoid sugar spikes.   Plan: predniSONE (DELTASONE) 20 MG tablet,         amoxicillin-clavulanate (AUGMENTIN) 875-125 MG         tablet        Medication sent to pharmacy. Discussed side effects. RTC if symptoms do not improve or worsen.     (J84.112) IPF (idiopathic pulmonary fibrosis) (H)  Comment: Patient has a long standing history of IPF. She is currently on 6L of O2 and has oxygen saturations of 88%. She states this is baseline for her and her lung disease. She also states she was suppose to follow-up with her PCP yesterday but the appointment was canceled due to the provider being ill. Patient was told the next available appointment was not until June.   Plan: Have patient follow-up with myself to get labs done in early may when she is off the steroids and off of the antibiotics. Patient agrees with the plan. Advised her that it would not be a physical and that she should continue to schedule a physical with her PCP. She verbalized understanding.     (H61.21) Impacted cerumen of right ear  Comment: Patient was found to have impacted ear cerumen on the right. Patient is in agreement to have a ear irrigation. Ordered irrigation  Plan: NC REMOVAL IMPACTED CERUMEN IRRIGATION/LVG         UNILAT        Right ear irrigated and patient tolerated well. Discharged home.       40 minutes spent by me on the date of the  "encounter doing chart review, review of test results, interpretation of tests, patient visit, documentation, and discussion with family             Subjective   Peyton is a 69 year old, presenting for the following health issues: Patient presents to the clinic for a chief complaint of ear pain bilaterally. She states this has been on going from the start of January when she was hospitalized for RSV. She states she was on Azithromycin after her hospitalization and had minimal relief but it came right back. She was suppose to see her PCP yesterday but had to get rescheduled due to a family emergency.     Patient is also very short of breath, on 6L O2. She states this is her normal and that she normally sats in the high to mid 80's. She is also able to speak clearly even though it is labored. She does have a lung disease (IPF). She denies any fevers, chest pain, heart palpitations. No nausea or vomiting and no recent exposures to illnesses.     Ear Problem (Left ear pain)      4/16/2024     2:15 PM   Additional Questions   Roomed by Michelle Casillas   Accompanied by          4/16/2024     2:15 PM   Patient Reported Additional Medications   Patient reports taking the following new medications no     Ear Problem    History of Present Illness       Reason for visit:  Ear    She eats 2-3 servings of fruits and vegetables daily.She consumes 0 sweetened beverage(s) daily.She exercises with enough effort to increase her heart rate 9 or less minutes per day.  She exercises with enough effort to increase her heart rate 3 or less days per week.   She is taking medications regularly.                 Review of Systems  Constitutional, HEENT, cardiovascular, pulmonary, gi and gu systems are negative, except as otherwise noted.      Objective    /66 (BP Location: Right arm, Patient Position: Sitting, Cuff Size: Adult Large)   Pulse 86   Temp 98.4  F (36.9  C) (Oral)   Resp (!) 32   Ht 1.702 m (5' 7\")   Wt 131.1 kg " (289 lb)   SpO2 (!) 88%   BMI 45.26 kg/m    Body mass index is 45.26 kg/m .  Physical Exam   GENERAL: alert and no distress  EYES: Eyes grossly normal to inspection  HENT: normal cephalic/atraumatic, right ear: occluded with wax, left ear: erythematous and bulging membrane, nose and mouth without ulcers or lesions, oropharynx clear, oral mucous membranes moist, and sinuses: frontal, ethmoid tenderness on bilaterally  NECK: no adenopathy, no asymmetry, masses, or scars  RESP: lungs clear to auscultation - no rales, rhonchi or wheezes, expiratory wheezes bilateral and throughout, and inspiratory wheezes bilateral and throughout  CV: regular rate and rhythm, normal S1 S2, no S3 or S4, no murmur, click or rub, no peripheral edema  ABDOMEN: soft, nontender, no hepatosplenomegaly, no masses and bowel sounds normal  MS: no gross musculoskeletal defects noted, no edema            Signed Electronically by: ALAN Redman CNP

## 2024-04-26 ENCOUNTER — PATIENT OUTREACH (OUTPATIENT)
Dept: CARE COORDINATION | Facility: CLINIC | Age: 69
End: 2024-04-26
Payer: COMMERCIAL

## 2024-04-29 ENCOUNTER — TELEPHONE (OUTPATIENT)
Dept: INTERNAL MEDICINE | Facility: CLINIC | Age: 69
End: 2024-04-29
Payer: COMMERCIAL

## 2024-04-29 NOTE — TELEPHONE ENCOUNTER
"Patient calls about sinus infection that improved slightly but not completely. Over the phone patient sounded short of breath and labored, writer questioned patient if she was having difficulty breathing. Patient reports it is her \"new normal\", she is on 6L of O2 and as long as she is not speaking or moving of SpO2 is above 90%. Patient denies fever and cough at this time. Writer requested patient check her SpO2 during call, it was 88% which is baseline for her and went up to 91% during the course of the call. Patient additionally reports she has not been tolerating bipap as much of late. Huddled with PCP, patient has follow-up with [facility] later this week but writer thinks she needs to follow up with PCP for chronic condition management. PCP said she can be seen 1PM on Thursday. Scheduled.    Patient additionally reports she has been trying to see pulmonology, appointment was canceled in April and patient was unaware office moved to [place], and she missed her appointment today. Unable to get in till August now.     Writer encouraged patient to wear Bipap as much as possible. Offered appointment tomorrow to follow-up on sinus infection, patient declined and said she would wait till Thursday. Writer went over red flag symptoms to escalate care with patient, patient and  verbalized understanding.     Appointments in Next Year      May 02, 2024  1:00 PM  (Arrive by 12:40 PM)  Provider Visit with Dimitri Ludwig MD  [facility] ([facility] ) [phone]         Shanthi RN   "

## 2024-05-02 ENCOUNTER — OFFICE VISIT (OUTPATIENT)
Dept: INTERNAL MEDICINE | Facility: CLINIC | Age: 69
End: 2024-05-02
Payer: COMMERCIAL

## 2024-05-02 VITALS
OXYGEN SATURATION: 86 % | HEIGHT: 67 IN | SYSTOLIC BLOOD PRESSURE: 134 MMHG | TEMPERATURE: 98.3 F | RESPIRATION RATE: 26 BRPM | DIASTOLIC BLOOD PRESSURE: 78 MMHG | HEART RATE: 94 BPM | BODY MASS INDEX: 45.26 KG/M2

## 2024-05-02 DIAGNOSIS — E11.9 TYPE 2 DIABETES MELLITUS WITHOUT COMPLICATION, WITHOUT LONG-TERM CURRENT USE OF INSULIN (H): Primary | ICD-10-CM

## 2024-05-02 DIAGNOSIS — R60.0 LOCALIZED EDEMA: ICD-10-CM

## 2024-05-02 DIAGNOSIS — J01.90 ACUTE SINUSITIS WITH SYMPTOMS > 10 DAYS: ICD-10-CM

## 2024-05-02 DIAGNOSIS — I10 BENIGN ESSENTIAL HYPERTENSION: ICD-10-CM

## 2024-05-02 DIAGNOSIS — E66.01 MORBID OBESITY (H): ICD-10-CM

## 2024-05-02 PROBLEM — K76.0 STEATOSIS OF LIVER: Status: ACTIVE | Noted: 2024-05-02

## 2024-05-02 PROBLEM — J96.21 ACUTE ON CHRONIC RESPIRATORY FAILURE WITH HYPOXEMIA (H): Status: RESOLVED | Noted: 2024-01-03 | Resolved: 2024-05-02

## 2024-05-02 LAB — HBA1C MFR BLD: 6.2 % (ref 0–5.6)

## 2024-05-02 PROCEDURE — 83036 HEMOGLOBIN GLYCOSYLATED A1C: CPT | Performed by: INTERNAL MEDICINE

## 2024-05-02 PROCEDURE — 99214 OFFICE O/P EST MOD 30 MIN: CPT | Performed by: INTERNAL MEDICINE

## 2024-05-02 PROCEDURE — G2211 COMPLEX E/M VISIT ADD ON: HCPCS | Performed by: INTERNAL MEDICINE

## 2024-05-02 PROCEDURE — 99207 PR FOOT EXAM NO CHARGE: CPT | Performed by: INTERNAL MEDICINE

## 2024-05-02 PROCEDURE — 36415 COLL VENOUS BLD VENIPUNCTURE: CPT | Performed by: INTERNAL MEDICINE

## 2024-05-02 RX ORDER — RESPIRATORY SYNCYTIAL VIRUS VACCINE 120MCG/0.5
0.5 KIT INTRAMUSCULAR ONCE
Qty: 1 EACH | Refills: 0 | Status: CANCELLED | OUTPATIENT
Start: 2024-05-02 | End: 2024-05-02

## 2024-05-02 RX ORDER — DOXYCYCLINE HYCLATE 100 MG
100 TABLET ORAL 2 TIMES DAILY
Qty: 20 TABLET | Refills: 0 | Status: SHIPPED | OUTPATIENT
Start: 2024-05-02

## 2024-05-02 RX ORDER — FUROSEMIDE 40 MG
40 TABLET ORAL 2 TIMES DAILY
Qty: 180 TABLET | Refills: 3 | Status: SHIPPED | OUTPATIENT
Start: 2024-05-02

## 2024-05-02 NOTE — PROGRESS NOTES
Assessment & Plan     Type 2 diabetes mellitus without complication, without long-term current use of insulin (H)  At this time, patient does have a follow-up hemoglobin A1c that is currently pending.  We did briefly discuss dietary modifications that can keep her blood sugar under good control.  Foot examinations up-to-date.  Further recommendations will be made once her labs are available for review.  - HEMOGLOBIN A1C    Benign essential hypertension  Patient's blood pressure does appear to be at goal.  She will continue irbesartan 300 mg/day for ongoing management of her blood pressure.  Side effects of medication were reviewed.  Patient was encouraged to monitor blood pressure outside the clinic setting.    Localized edema  This has been a chronic problem for the patient, but it seems that her edema has been kept under good control with the use of furosemide 40 mg by mouth twice per day.  Side effects of furosemide use were reviewed.  Will continue to monitor his response to this diuretic.  - furosemide (LASIX) 40 MG tablet; Take 1 tablet (40 mg) by mouth 2 times daily Patient taking 1 tablet twice a day    Acute sinusitis with symptoms > 10 days  Patient is still having some issues with persistent sinus symptoms.  We did elect to proceed with an alternative antibiotic.  Patient requested doxycycline 100 mg p.o. twice per week for 10 days for further treatment.  Side effects of medication did discuss use of nasal decongestants and his for nasal secretions.  We will monitor how she responds.  - doxycycline hyclate (VIBRA-TABS) 100 MG tablet; Take 1 tablet (100 mg) by mouth 2 times daily    Morbid obesity (H)  History of obesity with hypertension and diabetes.  Weight loss encouraged.    Ordering of each unique test  Prescription drug management  30 minutes spent by me on the date of the encounter doing chart review, history and exam, documentation and further activities per the note        See Patient  Instructions    Alexandra Todd is a 69 year old, presenting for the following health issues:  Follow Up        5/2/2024    12:50 PM   Additional Questions   Roomed by ALAINA Pisano     Patient is a 69-year-old  female with a [place] past medical history presents to the clinic for follow-up visit.  She was previously seen on April 16 for a hospital follow-up after being admitted with suspected RSV.  Patient had also been treated with azithromycin while in the hospital for any acute maxillary sinus infection.  Patient was continued have significant sinus pressure and congestion at her follow-up visit on April 16, and she was placed on a 10-day course of Augmentin.  Unfortunately, patient is still having symptoms despite her 4 course of Augmentin.  She reports that she is having significant sinus pressure and congestion.  She is also noted pressure in her ears bilaterally.  Patient does have a history of diabetes, but she is not currently on any medications.  She had previously been on chronic prednisone, but that was discontinued a few months ago.  Her last A1c had improved to 6.8 in October 2023.  Patient is due for follow-up labs at this time.  He is also requesting that we provide her with a refill of her furosemide 40 mg tablets that she takes twice daily for management of her edema.  Patient does feel that this medication is quite helpful to keep her swelling down.           Diabetes Follow-up    How often are you checking your blood sugar? Not at all  What concerns do you have today about your diabetes? None   Do you have any of these symptoms? (Select all that apply)  No numbness or tingling in feet.  No redness, sores or blisters on feet.  No complaints of excessive thirst.  No reports of blurry vision.  No significant changes to weight.  Have you had a diabetic eye exam in the last 12 months? No        BP Readings from Last 2 Encounters:   05/02/24 134/78   04/16/24 101/66     Hemoglobin A1C (%)  "  Date Value   10/12/2023 6.8 (H)   07/05/2023 8.5 (H)     LDL Cholesterol Calculated (mg/dL)   Date Value   12/14/2021 117 (H)   09/18/2019 82   09/12/2018 92         Hypertension Follow-up    Do you check your blood pressure regularly outside of the clinic? Yes   Are you following a low salt diet? Yes  Are your blood pressures ever more than 140 on the top number (systolic) OR more   than 90 on the bottom number (diastolic), for example 140/90? No  How many servings of fruits and vegetables do you eat daily?  0-1  On average, how many sweetened beverages do you drink each day (Examples: soda, juice, sweet tea, etc.  Do NOT count diet or artificially sweetened beverages)?   0  How many days per week do you exercise enough to make your heart beat faster? 3 or less  How many minutes a day do you exercise enough to make your heart beat faster? 9 or less  How many days per week do you miss taking your medication? 0        Review of Systems  CONSTITUTIONAL: NEGATIVE for fever, chills, change in weight  INTEGUMENTARY/SKIN: NEGATIVE for worrisome rashes, moles or lesions  ENT/MOUTH: Positive for sinus pressure, congestion, and ear pressure.  RESP: Positive for shortness of breath.  CV: NEGATIVE for chest pain, palpitations or peripheral edema  GI: NEGATIVE for nausea, abdominal pain, heartburn, or change in bowel habits  : NEGATIVE for frequency, dysuria, or hematuria  MUSCULOSKELETAL: NEGATIVE for significant arthralgias or myalgia  NEURO: NEGATIVE for weakness, dizziness or paresthesias      Objective    /78   Pulse 94   Temp 98.3  F (36.8  C)   Resp 26   Ht 1.702 m (5' 7\")   SpO2 (!) 86%   Breastfeeding No   BMI 45.26 kg/m    Body mass index is 45.26 kg/m .  Physical Exam  Vitals reviewed.   Constitutional:       Appearance: Normal appearance.   HENT:      Head: Normocephalic and atraumatic.      Right Ear: Tympanic membrane, ear canal and external ear normal.      Left Ear: Tympanic membrane, ear canal " and external ear normal.      Nose: Congestion and rhinorrhea present.      Right Sinus: Maxillary sinus tenderness present.      Left Sinus: Maxillary sinus tenderness present.   Eyes:      Extraocular Movements: Extraocular movements intact.      Conjunctiva/sclera: Conjunctivae normal.      Pupils: Pupils are equal, round, and reactive to light.   Cardiovascular:      Rate and Rhythm: Normal rate and regular rhythm.      Pulses: Normal pulses.           Dorsalis pedis pulses are 2+ on the right side and 2+ on the left side.        Posterior tibial pulses are 2+ on the right side and 2+ on the left side.      Heart sounds: Normal heart sounds.   Pulmonary:      Breath sounds: Normal breath sounds.   Feet:      Right foot:      Protective Sensation: 4 sites tested.  4 sites sensed.      Skin integrity: Skin integrity normal.      Left foot:      Protective Sensation: 4 sites tested.  4 sites sensed.      Skin integrity: Skin integrity normal.   Skin:     General: Skin is warm and dry.   Neurological:      Mental Status: She is alert.        Diagnostic testing: Hemoglobin A1c is pending.        Signed Electronically by: Dimitri Ludwig MD

## 2024-05-02 NOTE — PATIENT INSTRUCTIONS
Proceed with doxycycline 100 mg oral twice per day for sinus infection.    Diabetic labs are pending.

## 2024-05-10 ENCOUNTER — PATIENT OUTREACH (OUTPATIENT)
Dept: CARE COORDINATION | Facility: CLINIC | Age: 69
End: 2024-05-10
Payer: COMMERCIAL

## 2024-05-22 DIAGNOSIS — I10 BENIGN ESSENTIAL HYPERTENSION: ICD-10-CM

## 2024-05-22 RX ORDER — DILTIAZEM HYDROCHLORIDE 300 MG/1
CAPSULE, COATED, EXTENDED RELEASE ORAL
Qty: 90 CAPSULE | Refills: 0 | Status: SHIPPED | OUTPATIENT
Start: 2024-05-22 | End: 2024-08-15

## 2024-06-18 ENCOUNTER — TELEPHONE (OUTPATIENT)
Dept: INTERNAL MEDICINE | Facility: CLINIC | Age: 69
End: 2024-06-18
Payer: COMMERCIAL

## 2024-06-18 NOTE — TELEPHONE ENCOUNTER
Patient was called.  It was agreed that patient will arrive for her 6- appointment having fasted. Patient will then have labs done after her appointment.

## 2024-06-18 NOTE — TELEPHONE ENCOUNTER
General Call      Reason for Call:  Questions about labwork    What are your questions or concerns:  Pt was instructed to call before 6/20 appt and remind PCP to get the orders for lab work placed.    Pt is unsure if PCP is wanting pt to get labwork before appt or if the labs will be done at the appt      Date of last appointment with provider: 5/2/2024    Could we send this information to you in [employer] or would you prefer to receive a phone call?:   Patient would prefer a phone call   Okay to leave a detailed message?: Yes at Cell number on file:    Telephone Information:   Mobile [phone]

## 2024-06-20 ENCOUNTER — OFFICE VISIT (OUTPATIENT)
Dept: INTERNAL MEDICINE | Facility: CLINIC | Age: 69
End: 2024-06-20
Payer: COMMERCIAL

## 2024-06-20 ENCOUNTER — ANCILLARY PROCEDURE (OUTPATIENT)
Dept: GENERAL RADIOLOGY | Facility: CLINIC | Age: 69
End: 2024-06-20
Attending: INTERNAL MEDICINE
Payer: COMMERCIAL

## 2024-06-20 VITALS
HEART RATE: 96 BPM | TEMPERATURE: 98.1 F | RESPIRATION RATE: 28 BRPM | OXYGEN SATURATION: 93 % | WEIGHT: 289 LBS | DIASTOLIC BLOOD PRESSURE: 88 MMHG | BODY MASS INDEX: 45.36 KG/M2 | SYSTOLIC BLOOD PRESSURE: 135 MMHG | HEIGHT: 67 IN

## 2024-06-20 DIAGNOSIS — E11.9 TYPE 2 DIABETES MELLITUS WITHOUT COMPLICATION, WITHOUT LONG-TERM CURRENT USE OF INSULIN (H): ICD-10-CM

## 2024-06-20 DIAGNOSIS — R05.9 COUGH, UNSPECIFIED TYPE: Primary | ICD-10-CM

## 2024-06-20 DIAGNOSIS — I27.20 PULMONARY HYPERTENSION (H): ICD-10-CM

## 2024-06-20 DIAGNOSIS — I10 BENIGN ESSENTIAL HYPERTENSION: ICD-10-CM

## 2024-06-20 DIAGNOSIS — R05.9 COUGH, UNSPECIFIED TYPE: ICD-10-CM

## 2024-06-20 DIAGNOSIS — R60.0 LOCALIZED EDEMA: ICD-10-CM

## 2024-06-20 DIAGNOSIS — J84.112 IPF (IDIOPATHIC PULMONARY FIBROSIS) (H): ICD-10-CM

## 2024-06-20 PROBLEM — N85.2 UTERINE HYPERPLASIA: Status: ACTIVE | Noted: 2017-07-26

## 2024-06-20 LAB
ALBUMIN SERPL BCG-MCNC: 4.2 G/DL (ref 3.5–5.2)
ALP SERPL-CCNC: 121 U/L (ref 40–150)
ALT SERPL W P-5'-P-CCNC: 35 U/L (ref 0–50)
ANION GAP SERPL CALCULATED.3IONS-SCNC: 13 MMOL/L (ref 7–15)
AST SERPL W P-5'-P-CCNC: 41 U/L (ref 0–45)
BASOPHILS # BLD AUTO: 0 10E3/UL (ref 0–0.2)
BASOPHILS NFR BLD AUTO: 0 %
BILIRUB SERPL-MCNC: 0.5 MG/DL
BUN SERPL-MCNC: 20.5 MG/DL (ref 8–23)
CALCIUM SERPL-MCNC: 9.6 MG/DL (ref 8.8–10.2)
CHLORIDE SERPL-SCNC: 99 MMOL/L (ref 98–107)
CHOLEST SERPL-MCNC: 173 MG/DL
CREAT SERPL-MCNC: 0.75 MG/DL (ref 0.51–0.95)
DEPRECATED HCO3 PLAS-SCNC: 28 MMOL/L (ref 22–29)
EGFRCR SERPLBLD CKD-EPI 2021: 86 ML/MIN/1.73M2
EOSINOPHIL # BLD AUTO: 0.7 10E3/UL (ref 0–0.7)
EOSINOPHIL NFR BLD AUTO: 8 %
ERYTHROCYTE [DISTWIDTH] IN BLOOD BY AUTOMATED COUNT: 12.8 % (ref 10–15)
FASTING STATUS PATIENT QL REPORTED: YES
FASTING STATUS PATIENT QL REPORTED: YES
GLUCOSE SERPL-MCNC: 102 MG/DL (ref 70–99)
HCT VFR BLD AUTO: 41.2 % (ref 35–47)
HDLC SERPL-MCNC: 40 MG/DL
HGB BLD-MCNC: 13.7 G/DL (ref 11.7–15.7)
IMM GRANULOCYTES # BLD: 0 10E3/UL
IMM GRANULOCYTES NFR BLD: 0 %
LDLC SERPL CALC-MCNC: 113 MG/DL
LYMPHOCYTES # BLD AUTO: 1.6 10E3/UL (ref 0.8–5.3)
LYMPHOCYTES NFR BLD AUTO: 16 %
MCH RBC QN AUTO: 31 PG (ref 26.5–33)
MCHC RBC AUTO-ENTMCNC: 33.3 G/DL (ref 31.5–36.5)
MCV RBC AUTO: 93 FL (ref 78–100)
MONOCYTES # BLD AUTO: 0.5 10E3/UL (ref 0–1.3)
MONOCYTES NFR BLD AUTO: 6 %
NEUTROPHILS # BLD AUTO: 6.7 10E3/UL (ref 1.6–8.3)
NEUTROPHILS NFR BLD AUTO: 70 %
NONHDLC SERPL-MCNC: 133 MG/DL
PLATELET # BLD AUTO: 227 10E3/UL (ref 150–450)
POTASSIUM SERPL-SCNC: 3.9 MMOL/L (ref 3.4–5.3)
PROT SERPL-MCNC: 7.7 G/DL (ref 6.4–8.3)
RBC # BLD AUTO: 4.42 10E6/UL (ref 3.8–5.2)
SODIUM SERPL-SCNC: 140 MMOL/L (ref 135–145)
TRIGL SERPL-MCNC: 101 MG/DL
WBC # BLD AUTO: 9.5 10E3/UL (ref 4–11)

## 2024-06-20 PROCEDURE — 82570 ASSAY OF URINE CREATININE: CPT | Performed by: INTERNAL MEDICINE

## 2024-06-20 PROCEDURE — 82043 UR ALBUMIN QUANTITATIVE: CPT | Performed by: INTERNAL MEDICINE

## 2024-06-20 PROCEDURE — 36415 COLL VENOUS BLD VENIPUNCTURE: CPT | Performed by: INTERNAL MEDICINE

## 2024-06-20 PROCEDURE — 71046 X-RAY EXAM CHEST 2 VIEWS: CPT | Mod: TC | Performed by: RADIOLOGY

## 2024-06-20 PROCEDURE — 99214 OFFICE O/P EST MOD 30 MIN: CPT | Performed by: INTERNAL MEDICINE

## 2024-06-20 PROCEDURE — 80061 LIPID PANEL: CPT | Performed by: INTERNAL MEDICINE

## 2024-06-20 PROCEDURE — 80053 COMPREHEN METABOLIC PANEL: CPT | Performed by: INTERNAL MEDICINE

## 2024-06-20 PROCEDURE — 85025 COMPLETE CBC W/AUTO DIFF WBC: CPT | Performed by: INTERNAL MEDICINE

## 2024-06-20 RX ORDER — PREDNISONE 20 MG/1
20 TABLET ORAL 2 TIMES DAILY
Qty: 10 TABLET | Refills: 0 | Status: SHIPPED | OUTPATIENT
Start: 2024-06-20

## 2024-06-20 RX ORDER — RESPIRATORY SYNCYTIAL VIRUS VACCINE 120MCG/0.5
0.5 KIT INTRAMUSCULAR ONCE
Qty: 1 EACH | Refills: 0 | Status: CANCELLED | OUTPATIENT
Start: 2024-06-20 | End: 2024-06-20

## 2024-06-20 RX ORDER — POTASSIUM CHLORIDE 1500 MG/1
20 TABLET, EXTENDED RELEASE ORAL 2 TIMES DAILY
Qty: 180 TABLET | Refills: 3 | Status: SHIPPED | OUTPATIENT
Start: 2024-06-20

## 2024-06-20 RX ORDER — AZITHROMYCIN 250 MG/1
TABLET, FILM COATED ORAL
Qty: 6 TABLET | Refills: 0 | Status: SHIPPED | OUTPATIENT
Start: 2024-06-20 | End: 2024-06-25

## 2024-06-20 NOTE — PROGRESS NOTES
Assessment & Plan     Cough, unspecified type  Given her complicated past medical history and new onset cough, we did elect to proceed with a chest x-ray for further evaluation.  Images are currently pending.  Patient will be contacted with results once they are available for review.  Further recommendations will be made at that time.  - XR Chest 2 Views; Future  - CBC with platelets and differential    Type 2 diabetes mellitus without complication, without long-term current use of insulin (H)  Patient's last hemoglobin A1c was 6.2 in May 2024.  She does have a follow-up FLP and urine microalbumin that are pending.  Patient has been controlling her blood sugars through dietary modifications.  She will be due for repeat A1c in 6 months.  - Lipid panel reflex to direct LDL Non-fasting  - Albumin Random Urine Quantitative with Creat Ratio    Benign essential hypertension  Patient's blood pressure is noted to be at acceptable level.  Assuming no unexpected abnormalities on her outstanding medical panel, we will continue her Diltiazem ER 1 capsule by mouth per day and irbesartan 300 mg daily for ongoing management of her blood pressure.  Side effects of each medication were reviewed.  Patient was encouraged to monitor blood pressure outside the clinic site.  - Comprehensive metabolic panel (BMP + Alb, Alk Phos, ALT, AST, Total. Bili, TP)    IPF (idiopathic pulmonary fibrosis) (H)  Chronic condition.  Followed by pulmonology.  On supplemental oxygen therapy.  Patient does have significant limitations in her mobility due to her shortness of breath and respiratory issues.  She is unable to ambulate for any distance, she does require the use of a wheelchair for transportation.  Patient is looking to have a chairlift installed in her home, but she is discussing this matter further with her insurance.    Localized edema  Patient will continue her furosemide at 40 mg twice per day for ongoing management of her edema.  She  does have a metabolic panel that is currently pending.  Patient keeps a supply of potassium chloride 20 mill equivalents with instruction take with her about twice per day as she has had issues with hypokalemia.  We will follow-up with results for metabolic panel before recommending that she continue with the potassium at this time.  - potassium chloride ER (K-TAB) 20 MEQ CR tablet; Take 1 tablet (20 mEq) by mouth 2 times daily    Pulmonary hypertension (H)  Chronic condition.  Followed by cardiology.            See Patient Instructions    Subjective   Peyton is a 69 year old, presenting for the following health issues:  Recheck Medication        6/20/2024     8:08 AM   Additional Questions   Roomed by ALAINA Pisano     Patient is a 69-year-old  female with complicated past medical history who presents to the clinic for a follow-up visit.  She was last seen on May 2, 2024.  At that time, she was having some issues with a case of acute sinusitis.  Patient was placed on doxycycline 100 mg per mouth twice per day for 10 days.  Patient does feel that the medication was helpful for her sinus symptoms, but unfortunate she is still having some difficulties.  Patient reports some continued pressure in her ears bilaterally.  She now reports that she is having issues with a persistent cough with occasional sputum production.  Patient has not noted any issues with fevers or chills.  She does have a complex medical history that includes pulmonary hypertension and idiopathic pulmonary fibrosis.  Patient is on chronic supplemental oxygen.  She is concerned that she may have an infection in her chest given this cough with sputum production.  Patient would like to have repeat lab work performed as she has had issues with low potassium levels.  She does take furosemide 40 mg twice per day to help manage chronic issues with lower extremity edema.  Patient has also been on potassium chloride 20 mill equivalents twice per day as  "part of her treatment regimen.  Patient is concerned that her potassium levels may not be at the appropriate levels.         BP Readings from Last 2 Encounters:   06/20/24 135/88   05/02/24 134/78     Hemoglobin A1C (%)   Date Value   05/02/2024 6.2 (H)   10/12/2023 6.8 (H)     LDL Cholesterol Calculated (mg/dL)   Date Value   12/14/2021 117 (H)   09/18/2019 82   09/12/2018 92       Hypertension Follow-up    Do you check your blood pressure regularly outside of the clinic? No   Are you following a low salt diet? Yes  Are your blood pressures ever more than 140 on the top number (systolic) OR more   than 90 on the bottom number (diastolic), for example 140/90? No  How many servings of fruits and vegetables do you eat daily?  0-1  On average, how many sweetened beverages do you drink each day (Examples: soda, juice, sweet tea, etc.  Do NOT count diet or artificially sweetened beverages)?   0  How many days per week do you exercise enough to make your heart beat faster? 3 or less  How many minutes a day do you exercise enough to make your heart beat faster? 9 or less  How many days per week do you miss taking your medication? 0        Review of Systems  Constitutional, HEENT, cardiovascular, pulmonary, gi and gu systems are negative, except as otherwise noted.      Objective    /88   Pulse 96   Temp 98.1  F (36.7  C)   Resp 28   Ht 1.702 m (5' 7\")   Wt 131.1 kg (289 lb)   SpO2 93%   Breastfeeding No   BMI 45.26 kg/m    Body mass index is 45.26 kg/m .  Physical Exam  Vitals reviewed.   HENT:      Head: Normocephalic and atraumatic.      Right Ear: Tympanic membrane, ear canal and external ear normal.      Left Ear: Tympanic membrane, ear canal and external ear normal.      Nose: Congestion and rhinorrhea present.      Mouth/Throat:      Mouth: Mucous membranes are moist.      Pharynx: Oropharynx is clear.   Eyes:      Conjunctiva/sclera: Conjunctivae normal.      Pupils: Pupils are equal, round, and " reactive to light.   Cardiovascular:      Rate and Rhythm: Normal rate and regular rhythm.      Pulses: Normal pulses.      Heart sounds: Normal heart sounds.   Pulmonary:      Breath sounds: Wheezing present.      Comments: Patient is tachypneic.  Skin:     General: Skin is warm.      Capillary Refill: Capillary refill takes less than 2 seconds.   Neurological:      Mental Status: She is alert and oriented to person, place, and time. Mental status is at baseline.          Diagnostic testing: Chest x-ray, 2 views, is pending.  CMP, CBC, urine microalbumin, and lipid panel are pending.        Signed Electronically by: Dimitri Ludwig MD

## 2024-06-21 LAB
CREAT UR-MCNC: 86 MG/DL
MICROALBUMIN UR-MCNC: 13.5 MG/L
MICROALBUMIN/CREAT UR: 15.7 MG/G CR (ref 0–25)

## 2024-08-03 ENCOUNTER — HEALTH MAINTENANCE LETTER (OUTPATIENT)
Age: 69
End: 2024-08-03

## 2024-08-15 DIAGNOSIS — I10 BENIGN ESSENTIAL HYPERTENSION: ICD-10-CM

## 2024-08-15 RX ORDER — DILTIAZEM HYDROCHLORIDE 300 MG/1
CAPSULE, COATED, EXTENDED RELEASE ORAL
Qty: 90 CAPSULE | Refills: 2 | Status: SHIPPED | OUTPATIENT
Start: 2024-08-15

## 2024-08-15 NOTE — TELEPHONE ENCOUNTER
GFR Estimate   Date Value Ref Range Status   06/20/2024 86 >60 mL/min/1.73m2 Final     Comment:     eGFR calculated using 2021 CKD-EPI equation.   02/15/2021 83 >60 mL/min/[1.73_m2] Final     Comment:     Non  GFR Calc  Starting 12/18/2018, serum creatinine based estimated GFR (eGFR) will be   calculated using the Chronic Kidney Disease Epidemiology Collaboration   (CKD-EPI) equation.

## 2024-09-22 DIAGNOSIS — I10 BENIGN ESSENTIAL HYPERTENSION: ICD-10-CM

## 2024-09-23 RX ORDER — IRBESARTAN 300 MG/1
300 TABLET ORAL DAILY
Qty: 90 TABLET | Refills: 0 | Status: SHIPPED | OUTPATIENT
Start: 2024-09-23

## 2024-12-21 ENCOUNTER — HEALTH MAINTENANCE LETTER (OUTPATIENT)
Age: 69
End: 2024-12-21

## 2025-01-07 ENCOUNTER — DOCUMENTATION ONLY (OUTPATIENT)
Dept: INTERNAL MEDICINE | Facility: CLINIC | Age: 70
End: 2025-01-07
Payer: COMMERCIAL

## 2025-01-07 DIAGNOSIS — J84.112 IPF (IDIOPATHIC PULMONARY FIBROSIS) (H): Primary | ICD-10-CM

## 2025-01-16 DIAGNOSIS — I10 BENIGN ESSENTIAL HYPERTENSION: ICD-10-CM

## 2025-01-16 RX ORDER — IRBESARTAN 300 MG/1
300 TABLET ORAL DAILY
Qty: 90 TABLET | Refills: 0 | OUTPATIENT
Start: 2025-01-16

## 2025-01-16 RX ORDER — IRBESARTAN 300 MG/1
300 TABLET ORAL DAILY
Qty: 90 TABLET | Refills: 0 | Status: SHIPPED | OUTPATIENT
Start: 2025-01-16

## 2025-02-07 ENCOUNTER — APPOINTMENT (OUTPATIENT)
Dept: GENERAL RADIOLOGY | Facility: CLINIC | Age: 70
End: 2025-02-07
Attending: EMERGENCY MEDICINE
Payer: COMMERCIAL

## 2025-02-07 ENCOUNTER — HOSPITAL ENCOUNTER (INPATIENT)
Facility: CLINIC | Age: 70
LOS: 5 days | Discharge: HOME | End: 2025-02-12
Attending: EMERGENCY MEDICINE | Admitting: INTERNAL MEDICINE
Payer: COMMERCIAL

## 2025-02-07 DIAGNOSIS — J96.21 ACUTE AND CHRONIC RESPIRATORY FAILURE WITH HYPOXIA (H): ICD-10-CM

## 2025-02-07 DIAGNOSIS — J84.112 IDIOPATHIC PULMONARY FIBROSIS (H): ICD-10-CM

## 2025-02-07 LAB
ANION GAP SERPL CALCULATED.3IONS-SCNC: 11 MMOL/L (ref 7–15)
ATRIAL RATE - MUSE: 115 BPM
BASE EXCESS BLDV CALC-SCNC: 4 MMOL/L (ref -3–3)
BASOPHILS # BLD AUTO: 0.1 10E3/UL (ref 0–0.2)
BASOPHILS NFR BLD AUTO: 0 %
BUN SERPL-MCNC: 16.1 MG/DL (ref 8–23)
C PNEUM DNA SPEC QL NAA+PROBE: NOT DETECTED
CALCIUM SERPL-MCNC: 9.2 MG/DL (ref 8.8–10.4)
CHLORIDE SERPL-SCNC: 100 MMOL/L (ref 98–107)
CREAT SERPL-MCNC: 0.7 MG/DL (ref 0.51–0.95)
CRP SERPL-MCNC: 81.96 MG/L
DIASTOLIC BLOOD PRESSURE - MUSE: NORMAL MMHG
EGFRCR SERPLBLD CKD-EPI 2021: >90 ML/MIN/1.73M2
EOSINOPHIL # BLD AUTO: 0.2 10E3/UL (ref 0–0.7)
EOSINOPHIL NFR BLD AUTO: 1 %
ERYTHROCYTE [DISTWIDTH] IN BLOOD BY AUTOMATED COUNT: 12.1 % (ref 10–15)
FLUAV H1 2009 PAND RNA SPEC QL NAA+PROBE: NOT DETECTED
FLUAV H1 RNA SPEC QL NAA+PROBE: NOT DETECTED
FLUAV H3 RNA SPEC QL NAA+PROBE: NOT DETECTED
FLUAV RNA SPEC QL NAA+PROBE: NEGATIVE
FLUAV RNA SPEC QL NAA+PROBE: NOT DETECTED
FLUBV RNA RESP QL NAA+PROBE: NEGATIVE
FLUBV RNA SPEC QL NAA+PROBE: NOT DETECTED
GLUCOSE SERPL-MCNC: 147 MG/DL (ref 70–99)
HADV DNA SPEC QL NAA+PROBE: NOT DETECTED
HCO3 BLDV-SCNC: 30 MMOL/L (ref 21–28)
HCO3 SERPL-SCNC: 26 MMOL/L (ref 22–29)
HCOV PNL SPEC NAA+PROBE: NOT DETECTED
HCT VFR BLD AUTO: 40.7 % (ref 35–47)
HGB BLD-MCNC: 13.8 G/DL (ref 11.7–15.7)
HMPV RNA SPEC QL NAA+PROBE: NOT DETECTED
HOLD SPECIMEN: NORMAL
HOLD SPECIMEN: NORMAL
HPIV1 RNA SPEC QL NAA+PROBE: NOT DETECTED
HPIV2 RNA SPEC QL NAA+PROBE: NOT DETECTED
HPIV3 RNA SPEC QL NAA+PROBE: NOT DETECTED
HPIV4 RNA SPEC QL NAA+PROBE: NOT DETECTED
IMM GRANULOCYTES # BLD: 0.1 10E3/UL
IMM GRANULOCYTES NFR BLD: 0 %
INR PPP: 1.07 (ref 0.85–1.15)
INTERPRETATION ECG - MUSE: NORMAL
LYMPHOCYTES # BLD AUTO: 2 10E3/UL (ref 0.8–5.3)
LYMPHOCYTES NFR BLD AUTO: 13 %
M PNEUMO DNA SPEC QL NAA+PROBE: NOT DETECTED
MAGNESIUM SERPL-MCNC: 1.9 MG/DL (ref 1.7–2.3)
MCH RBC QN AUTO: 31.2 PG (ref 26.5–33)
MCHC RBC AUTO-ENTMCNC: 33.9 G/DL (ref 31.5–36.5)
MCV RBC AUTO: 92 FL (ref 78–100)
MONOCYTES # BLD AUTO: 1 10E3/UL (ref 0–1.3)
MONOCYTES NFR BLD AUTO: 6 %
MRSA DNA SPEC QL NAA+PROBE: NEGATIVE
NEUTROPHILS # BLD AUTO: 12.3 10E3/UL (ref 1.6–8.3)
NEUTROPHILS NFR BLD AUTO: 79 %
NRBC # BLD AUTO: 0 10E3/UL
NRBC BLD AUTO-RTO: 0 /100
NT-PROBNP SERPL-MCNC: 3935 PG/ML (ref 0–900)
O2/TOTAL GAS SETTING VFR VENT: 50 %
OXYHGB MFR BLDV: 58 % (ref 70–75)
P AXIS - MUSE: 41 DEGREES
PCO2 BLDV: 50 MM HG (ref 40–50)
PH BLDV: 7.39 [PH] (ref 7.32–7.43)
PLATELET # BLD AUTO: 219 10E3/UL (ref 150–450)
PO2 BLDV: 31 MM HG (ref 25–47)
POTASSIUM SERPL-SCNC: 4.3 MMOL/L (ref 3.4–5.3)
PR INTERVAL - MUSE: 162 MS
QRS DURATION - MUSE: 80 MS
QT - MUSE: 316 MS
QTC - MUSE: 437 MS
R AXIS - MUSE: 116 DEGREES
RBC # BLD AUTO: 4.43 10E6/UL (ref 3.8–5.2)
RSV RNA SPEC NAA+PROBE: NEGATIVE
RSV RNA SPEC QL NAA+PROBE: NOT DETECTED
RSV RNA SPEC QL NAA+PROBE: NOT DETECTED
RV+EV RNA SPEC QL NAA+PROBE: NOT DETECTED
SA TARGET DNA: POSITIVE
SAO2 % BLDV: 59.7 % (ref 70–75)
SARS-COV-2 RNA RESP QL NAA+PROBE: NEGATIVE
SODIUM SERPL-SCNC: 137 MMOL/L (ref 135–145)
SYSTOLIC BLOOD PRESSURE - MUSE: NORMAL MMHG
T AXIS - MUSE: 29 DEGREES
TROPONIN T SERPL HS-MCNC: 27 NG/L
TROPONIN T SERPL HS-MCNC: 30 NG/L
VENTRICULAR RATE- MUSE: 115 BPM
WBC # BLD AUTO: 15.6 10E3/UL (ref 4–11)

## 2025-02-07 PROCEDURE — 99222 1ST HOSP IP/OBS MODERATE 55: CPT | Performed by: STUDENT IN AN ORGANIZED HEALTH CARE EDUCATION/TRAINING PROGRAM

## 2025-02-07 PROCEDURE — 87633 RESP VIRUS 12-25 TARGETS: CPT | Performed by: INTERNAL MEDICINE

## 2025-02-07 PROCEDURE — 85025 COMPLETE CBC W/AUTO DIFF WBC: CPT | Performed by: EMERGENCY MEDICINE

## 2025-02-07 PROCEDURE — 36415 COLL VENOUS BLD VENIPUNCTURE: CPT | Performed by: STUDENT IN AN ORGANIZED HEALTH CARE EDUCATION/TRAINING PROGRAM

## 2025-02-07 PROCEDURE — 250N000009 HC RX 250: Performed by: INTERNAL MEDICINE

## 2025-02-07 PROCEDURE — 85610 PROTHROMBIN TIME: CPT | Performed by: EMERGENCY MEDICINE

## 2025-02-07 PROCEDURE — 99223 1ST HOSP IP/OBS HIGH 75: CPT | Performed by: INTERNAL MEDICINE

## 2025-02-07 PROCEDURE — 5A09457 ASSISTANCE WITH RESPIRATORY VENTILATION, 24-96 CONSECUTIVE HOURS, CONTINUOUS POSITIVE AIRWAY PRESSURE: ICD-10-PCS | Performed by: EMERGENCY MEDICINE

## 2025-02-07 PROCEDURE — 84484 ASSAY OF TROPONIN QUANT: CPT | Performed by: EMERGENCY MEDICINE

## 2025-02-07 PROCEDURE — 86140 C-REACTIVE PROTEIN: CPT | Performed by: INTERNAL MEDICINE

## 2025-02-07 PROCEDURE — 87640 STAPH A DNA AMP PROBE: CPT | Performed by: INTERNAL MEDICINE

## 2025-02-07 PROCEDURE — 71045 X-RAY EXAM CHEST 1 VIEW: CPT

## 2025-02-07 PROCEDURE — 87040 BLOOD CULTURE FOR BACTERIA: CPT | Performed by: EMERGENCY MEDICINE

## 2025-02-07 PROCEDURE — 96374 THER/PROPH/DIAG INJ IV PUSH: CPT | Mod: 59

## 2025-02-07 PROCEDURE — 250N000011 HC RX IP 250 OP 636: Performed by: EMERGENCY MEDICINE

## 2025-02-07 PROCEDURE — 250N000013 HC RX MED GY IP 250 OP 250 PS 637: Performed by: INTERNAL MEDICINE

## 2025-02-07 PROCEDURE — 99291 CRITICAL CARE FIRST HOUR: CPT | Mod: 25

## 2025-02-07 PROCEDURE — 36415 COLL VENOUS BLD VENIPUNCTURE: CPT | Performed by: EMERGENCY MEDICINE

## 2025-02-07 PROCEDURE — 94640 AIRWAY INHALATION TREATMENT: CPT | Mod: 76

## 2025-02-07 PROCEDURE — 250N000011 HC RX IP 250 OP 636: Performed by: INTERNAL MEDICINE

## 2025-02-07 PROCEDURE — 94640 AIRWAY INHALATION TREATMENT: CPT

## 2025-02-07 PROCEDURE — 272N000272 HC CONTINUOUS NEBULIZER MICRO PUMP

## 2025-02-07 PROCEDURE — 96368 THER/DIAG CONCURRENT INF: CPT | Mod: 59

## 2025-02-07 PROCEDURE — 120N000013 HC R&B IMCU

## 2025-02-07 PROCEDURE — 272N000054 HC CANNULA HIGH FLOW, ADULT

## 2025-02-07 PROCEDURE — 258N000003 HC RX IP 258 OP 636: Performed by: EMERGENCY MEDICINE

## 2025-02-07 PROCEDURE — 93005 ELECTROCARDIOGRAM TRACING: CPT | Mod: 59

## 2025-02-07 PROCEDURE — 83880 ASSAY OF NATRIURETIC PEPTIDE: CPT | Performed by: EMERGENCY MEDICINE

## 2025-02-07 PROCEDURE — 87637 SARSCOV2&INF A&B&RSV AMP PRB: CPT | Performed by: EMERGENCY MEDICINE

## 2025-02-07 PROCEDURE — 94660 CPAP INITIATION&MGMT: CPT

## 2025-02-07 PROCEDURE — 82805 BLOOD GASES W/O2 SATURATION: CPT | Performed by: EMERGENCY MEDICINE

## 2025-02-07 PROCEDURE — 999N000157 HC STATISTIC RCP TIME EA 10 MIN

## 2025-02-07 PROCEDURE — 96365 THER/PROPH/DIAG IV INF INIT: CPT | Mod: 59

## 2025-02-07 PROCEDURE — 250N000011 HC RX IP 250 OP 636: Performed by: STUDENT IN AN ORGANIZED HEALTH CARE EDUCATION/TRAINING PROGRAM

## 2025-02-07 PROCEDURE — 80048 BASIC METABOLIC PNL TOTAL CA: CPT | Performed by: EMERGENCY MEDICINE

## 2025-02-07 PROCEDURE — 250N000009 HC RX 250: Performed by: EMERGENCY MEDICINE

## 2025-02-07 PROCEDURE — 83735 ASSAY OF MAGNESIUM: CPT | Performed by: EMERGENCY MEDICINE

## 2025-02-07 PROCEDURE — 87305 ASPERGILLUS AG IA: CPT | Performed by: STUDENT IN AN ORGANIZED HEALTH CARE EDUCATION/TRAINING PROGRAM

## 2025-02-07 RX ORDER — FUROSEMIDE 40 MG/1
40 TABLET ORAL 2 TIMES DAILY
Status: DISCONTINUED | OUTPATIENT
Start: 2025-02-07 | End: 2025-02-12 | Stop reason: HOSPADM

## 2025-02-07 RX ORDER — MAGNESIUM SULFATE HEPTAHYDRATE 40 MG/ML
2 INJECTION, SOLUTION INTRAVENOUS ONCE
Status: COMPLETED | OUTPATIENT
Start: 2025-02-07 | End: 2025-02-07

## 2025-02-07 RX ORDER — SODIUM CHLORIDE 9 MG/ML
1000 INJECTION, SOLUTION INTRAVENOUS CONTINUOUS
Status: DISCONTINUED | OUTPATIENT
Start: 2025-02-07 | End: 2025-02-07

## 2025-02-07 RX ORDER — ACETAMINOPHEN 650 MG/1
650 SUPPOSITORY RECTAL EVERY 4 HOURS PRN
Status: DISCONTINUED | OUTPATIENT
Start: 2025-02-07 | End: 2025-02-12 | Stop reason: HOSPADM

## 2025-02-07 RX ORDER — CARBOXYMETHYLCELLULOSE SODIUM 5 MG/ML
1 SOLUTION/ DROPS OPHTHALMIC
Status: DISCONTINUED | OUTPATIENT
Start: 2025-02-07 | End: 2025-02-12 | Stop reason: HOSPADM

## 2025-02-07 RX ORDER — LIDOCAINE 40 MG/G
CREAM TOPICAL
Status: DISCONTINUED | OUTPATIENT
Start: 2025-02-07 | End: 2025-02-07

## 2025-02-07 RX ORDER — IRBESARTAN 300 MG/1
300 TABLET ORAL DAILY
Status: DISCONTINUED | OUTPATIENT
Start: 2025-02-08 | End: 2025-02-12 | Stop reason: HOSPADM

## 2025-02-07 RX ORDER — CALCIUM CARBONATE 500 MG/1
1000 TABLET, CHEWABLE ORAL 4 TIMES DAILY PRN
Status: DISCONTINUED | OUTPATIENT
Start: 2025-02-07 | End: 2025-02-12 | Stop reason: HOSPADM

## 2025-02-07 RX ORDER — IPRATROPIUM BROMIDE AND ALBUTEROL SULFATE 2.5; .5 MG/3ML; MG/3ML
3 SOLUTION RESPIRATORY (INHALATION) EVERY 4 HOURS PRN
Status: DISCONTINUED | OUTPATIENT
Start: 2025-02-07 | End: 2025-02-12 | Stop reason: HOSPADM

## 2025-02-07 RX ORDER — AZITHROMYCIN 250 MG/1
250 TABLET, FILM COATED ORAL DAILY
Status: COMPLETED | OUTPATIENT
Start: 2025-02-08 | End: 2025-02-11

## 2025-02-07 RX ORDER — POTASSIUM CHLORIDE 1500 MG/1
20 TABLET, EXTENDED RELEASE ORAL 2 TIMES DAILY
Status: DISCONTINUED | OUTPATIENT
Start: 2025-02-07 | End: 2025-02-12 | Stop reason: HOSPADM

## 2025-02-07 RX ORDER — METHYLPREDNISOLONE SODIUM SUCCINATE 125 MG/2ML
60 INJECTION INTRAMUSCULAR; INTRAVENOUS
Status: DISCONTINUED | OUTPATIENT
Start: 2025-02-07 | End: 2025-02-12 | Stop reason: HOSPADM

## 2025-02-07 RX ORDER — ONDANSETRON 2 MG/ML
4 INJECTION INTRAMUSCULAR; INTRAVENOUS EVERY 6 HOURS PRN
Status: DISCONTINUED | OUTPATIENT
Start: 2025-02-07 | End: 2025-02-12 | Stop reason: HOSPADM

## 2025-02-07 RX ORDER — METHYLPREDNISOLONE SODIUM SUCCINATE 125 MG/2ML
125 INJECTION INTRAMUSCULAR; INTRAVENOUS ONCE
Status: COMPLETED | OUTPATIENT
Start: 2025-02-07 | End: 2025-02-07

## 2025-02-07 RX ORDER — AMOXICILLIN 250 MG
1 CAPSULE ORAL 2 TIMES DAILY PRN
Status: DISCONTINUED | OUTPATIENT
Start: 2025-02-07 | End: 2025-02-12 | Stop reason: HOSPADM

## 2025-02-07 RX ORDER — AZITHROMYCIN 500 MG/5ML
500 INJECTION, POWDER, LYOPHILIZED, FOR SOLUTION INTRAVENOUS ONCE
Status: COMPLETED | OUTPATIENT
Start: 2025-02-07 | End: 2025-02-07

## 2025-02-07 RX ORDER — FLUTICASONE FUROATE AND VILANTEROL 200; 25 UG/1; UG/1
1 POWDER RESPIRATORY (INHALATION) DAILY
Status: DISCONTINUED | OUTPATIENT
Start: 2025-02-07 | End: 2025-02-12 | Stop reason: HOSPADM

## 2025-02-07 RX ORDER — MAGNESIUM SULFATE HEPTAHYDRATE 40 MG/ML
2 INJECTION, SOLUTION INTRAVENOUS ONCE
Status: DISCONTINUED | OUTPATIENT
Start: 2025-02-07 | End: 2025-02-07

## 2025-02-07 RX ORDER — IPRATROPIUM BROMIDE AND ALBUTEROL SULFATE 2.5; .5 MG/3ML; MG/3ML
6 SOLUTION RESPIRATORY (INHALATION) ONCE
Status: COMPLETED | OUTPATIENT
Start: 2025-02-07 | End: 2025-02-07

## 2025-02-07 RX ORDER — ALBUTEROL SULFATE 0.83 MG/ML
2.5 SOLUTION RESPIRATORY (INHALATION)
Status: DISCONTINUED | OUTPATIENT
Start: 2025-02-07 | End: 2025-02-07

## 2025-02-07 RX ORDER — CEFTRIAXONE 2 G/1
2 INJECTION, POWDER, FOR SOLUTION INTRAMUSCULAR; INTRAVENOUS
Status: DISCONTINUED | OUTPATIENT
Start: 2025-02-07 | End: 2025-02-11

## 2025-02-07 RX ORDER — ONDANSETRON 4 MG/1
4 TABLET, ORALLY DISINTEGRATING ORAL EVERY 6 HOURS PRN
Status: DISCONTINUED | OUTPATIENT
Start: 2025-02-07 | End: 2025-02-12 | Stop reason: HOSPADM

## 2025-02-07 RX ORDER — POLYETHYLENE GLYCOL 3350 17 G/17G
17 POWDER, FOR SOLUTION ORAL 2 TIMES DAILY PRN
Status: DISCONTINUED | OUTPATIENT
Start: 2025-02-07 | End: 2025-02-12 | Stop reason: HOSPADM

## 2025-02-07 RX ORDER — LIDOCAINE 40 MG/G
CREAM TOPICAL
Status: DISCONTINUED | OUTPATIENT
Start: 2025-02-07 | End: 2025-02-12 | Stop reason: HOSPADM

## 2025-02-07 RX ORDER — AMOXICILLIN 250 MG
2 CAPSULE ORAL 2 TIMES DAILY PRN
Status: DISCONTINUED | OUTPATIENT
Start: 2025-02-07 | End: 2025-02-12 | Stop reason: HOSPADM

## 2025-02-07 RX ORDER — PANTOPRAZOLE SODIUM 40 MG/1
40 TABLET, DELAYED RELEASE ORAL 2 TIMES DAILY
Status: DISCONTINUED | OUTPATIENT
Start: 2025-02-08 | End: 2025-02-12 | Stop reason: HOSPADM

## 2025-02-07 RX ORDER — METHYLPREDNISOLONE SODIUM SUCCINATE 40 MG/ML
40 INJECTION INTRAMUSCULAR; INTRAVENOUS EVERY 8 HOURS
Status: DISCONTINUED | OUTPATIENT
Start: 2025-02-07 | End: 2025-02-07

## 2025-02-07 RX ORDER — IPRATROPIUM BROMIDE AND ALBUTEROL SULFATE 2.5; .5 MG/3ML; MG/3ML
3 SOLUTION RESPIRATORY (INHALATION)
Status: DISCONTINUED | OUTPATIENT
Start: 2025-02-07 | End: 2025-02-12 | Stop reason: HOSPADM

## 2025-02-07 RX ORDER — ACETAMINOPHEN 325 MG/1
650 TABLET ORAL EVERY 4 HOURS PRN
Status: DISCONTINUED | OUTPATIENT
Start: 2025-02-07 | End: 2025-02-12 | Stop reason: HOSPADM

## 2025-02-07 RX ADMIN — IPRATROPIUM BROMIDE AND ALBUTEROL SULFATE 3 ML: .5; 3 SOLUTION RESPIRATORY (INHALATION) at 21:12

## 2025-02-07 RX ADMIN — METHYLPREDNISOLONE SODIUM SUCCINATE 125 MG: 125 INJECTION, POWDER, FOR SOLUTION INTRAMUSCULAR; INTRAVENOUS at 10:32

## 2025-02-07 RX ADMIN — POTASSIUM CHLORIDE 20 MEQ: 1500 TABLET, EXTENDED RELEASE ORAL at 17:25

## 2025-02-07 RX ADMIN — IPRATROPIUM BROMIDE AND ALBUTEROL SULFATE 3 ML: .5; 3 SOLUTION RESPIRATORY (INHALATION) at 16:52

## 2025-02-07 RX ADMIN — FUROSEMIDE 40 MG: 40 TABLET ORAL at 17:25

## 2025-02-07 RX ADMIN — AZITHROMYCIN MONOHYDRATE 500 MG: 500 INJECTION, POWDER, LYOPHILIZED, FOR SOLUTION INTRAVENOUS at 10:52

## 2025-02-07 RX ADMIN — MAGNESIUM SULFATE HEPTAHYDRATE 2 G: 40 INJECTION, SOLUTION INTRAVENOUS at 10:39

## 2025-02-07 RX ADMIN — IPRATROPIUM BROMIDE AND ALBUTEROL SULFATE 6 ML: .5; 3 SOLUTION RESPIRATORY (INHALATION) at 10:33

## 2025-02-07 RX ADMIN — SODIUM CHLORIDE 500 ML: 9 INJECTION, SOLUTION INTRAVENOUS at 10:51

## 2025-02-07 RX ADMIN — METHYLPREDNISOLONE SODIUM SUCCINATE 62.5 MG: 125 INJECTION, POWDER, FOR SOLUTION INTRAMUSCULAR; INTRAVENOUS at 21:29

## 2025-02-07 RX ADMIN — CEFTRIAXONE 2 G: 2 INJECTION, POWDER, FOR SOLUTION INTRAMUSCULAR; INTRAVENOUS at 17:04

## 2025-02-07 ASSESSMENT — ACTIVITIES OF DAILY LIVING (ADL)
ADLS_ACUITY_SCORE: 58
ADLS_ACUITY_SCORE: 67
ADLS_ACUITY_SCORE: 58
ADLS_ACUITY_SCORE: 58

## 2025-02-07 ASSESSMENT — COLUMBIA-SUICIDE SEVERITY RATING SCALE - C-SSRS
6. HAVE YOU EVER DONE ANYTHING, STARTED TO DO ANYTHING, OR PREPARED TO DO ANYTHING TO END YOUR LIFE?: NO
1. IN THE PAST MONTH, HAVE YOU WISHED YOU WERE DEAD OR WISHED YOU COULD GO TO SLEEP AND NOT WAKE UP?: NO
2. HAVE YOU ACTUALLY HAD ANY THOUGHTS OF KILLING YOURSELF IN THE PAST MONTH?: NO

## 2025-02-07 NOTE — ED NOTES
Pt tolerating HFNC, changed into gown. Lasix and potassium given, Purewick placed. IV ABX infusing. Updated pt on transfer to room 322.

## 2025-02-07 NOTE — ED NOTES
[facility]  ED Nurse Handoff Report    ED Chief complaint: Respiratory Distress  . ED Diagnosis:   Final diagnoses:   Acute and chronic respiratory failure with hypoxia (H)   Idiopathic pulmonary fibrosis (H)       Allergies:   Allergies   Allergen Reactions    No Known Allergies        Code Status: DNR / DNI    Activity level - Baseline/Home:  Wheelchair, stands to pivot.   Activity Level - Current:   assist of 2.   Lift room needed: No.   Bariatric: No   Needed: No   Isolation: No.   Infection: Not Applicable.     Respiratory status: BiPap    Vital Signs (within 30 minutes):   Vitals:    02/07/25 1126 02/07/25 1147 02/07/25 1155 02/07/25 1217   BP: 94/43 108/81 112/66 104/77   Pulse: 112 113 112 112   Resp: 26 28 26 25   Temp:       TempSrc:       SpO2: 95% 95% 95% 96%   Weight:       Height:           Cardiac Rhythm:  ,      Pain level:    Patient confused: No.   Patient Falls Risk: bed/chair alarm on, nonskid shoes/slippers when out of bed, arm band in place, patient and family education, and assistive device/personal items within reach.   Elimination Status: Has voided     Patient Report - Initial Complaint: Respiratory Distress.   Focused Assessment: Mary E Weiland is a 69 year old female with a history of advanced pulmonary fibrosis with chronic low oxygen, who presents via EMS for respiratory distress. EMS report patient comes from home with about 3 days of difficulty breathing with minimal relief from her BiPAP at night. Her shortness of breath worsens with movement and has not been standing at all for the past few days. Patient was found to have worsened shortness of breath this morning with 65% SpO2 while on 15 L nasal cannula at home. EMS report 85% SpO2 on CPAP. No meds given. BG was 172. Patient denies any abdominal pain, chest pain or any other pain. She reports she would not like to be intubated.      Abnormal Results:   Labs Ordered and Resulted from Time of ED  Arrival to Time of ED Departure   BASIC METABOLIC PANEL - Abnormal       Result Value    Sodium 137      Potassium 4.3      Chloride 100      Carbon Dioxide (CO2) 26      Anion Gap 11      Urea Nitrogen 16.1      Creatinine 0.70      GFR Estimate >90      Calcium 9.2      Glucose 147 (*)    TROPONIN T, HIGH SENSITIVITY - Abnormal    Troponin T, High Sensitivity 30 (*)    NT PROBNP INPATIENT - Abnormal    N terminal Pro BNP Inpatient 3,935 (*)    CBC WITH PLATELETS AND DIFFERENTIAL - Abnormal    WBC Count 15.6 (*)     RBC Count 4.43      Hemoglobin 13.8      Hematocrit 40.7      MCV 92      MCH 31.2      MCHC 33.9      RDW 12.1      Platelet Count 219      % Neutrophils 79      % Lymphocytes 13      % Monocytes 6      % Eosinophils 1      % Basophils 0      % Immature Granulocytes 0      NRBCs per 100 WBC 0      Absolute Neutrophils 12.3 (*)     Absolute Lymphocytes 2.0      Absolute Monocytes 1.0      Absolute Eosinophils 0.2      Absolute Basophils 0.1      Absolute Immature Granulocytes 0.1      Absolute NRBCs 0.0     BLOOD GAS VENOUS - Abnormal    pH Venous 7.39      pCO2 Venous 50      pO2 Venous 31      Bicarbonate Venous 30 (*)     Base Excess/Deficit Venous 4.0 (*)     FIO2 50      Oxyhemoglobin Venous 58 (*)     O2 Sat, Venous 59.7 (*)    MAGNESIUM - Normal    Magnesium 1.9     INR - Normal    INR 1.07     INFLUENZA A/B, RSV AND SARS-COV2 PCR - Normal    Influenza A PCR Negative      Influenza B PCR Negative      RSV PCR Negative      SARS CoV2 PCR Negative     TROPONIN T, HIGH SENSITIVITY   BLOOD CULTURE   BLOOD CULTURE        XR Chest Port 1 View   Final Result   IMPRESSION: Left lower lung consolidative opacity with air bronchograms. Hazier opacities in the upper lungs with some reticulation. Findings most suspicious for multifocal pneumonitis. Follow-up chest x-ray in 6 weeks recommended. No significant pleural    effusion. No pneumothorax. Cardiac silhouette accentuated by technique but likely within  normal limits.          Treatments provided: Bi-Pap, See MAR  Family Comments:  at bedside  OBS brochure/video discussed/provided to patient:  N/A  ED Medications:   Medications   sodium chloride 0.9 % infusion (has no administration in time range)   methylPREDNISolone Na Suc (solu-MEDROL) injection 125 mg (125 mg Intravenous $Given 2/7/25 1032)   azithromycin (ZITHROMAX) 500 mg in  mL intermittent infusion (0 mg Intravenous Stopped 2/7/25 1219)   magnesium sulfate 2 g in 50 mL sterile water intermittent infusion (0 g Intravenous Stopped 2/7/25 1139)   sodium chloride 0.9% BOLUS 500 mL (0 mLs Intravenous Stopped 2/7/25 1139)   ipratropium - albuterol 0.5 mg/2.5 mg/3 mL (DUONEB) neb solution 6 mL (6 mLs Nebulization $Given 2/7/25 1033)       Drips infusing:  No  For the majority of the shift this patient was Green.   Interventions performed were N/A.    Sepsis treatment initiated: No    Cares/treatment/interventions/medications to be completed following ED care: See Orders    ED Nurse Name: Joceline Coello RN  12:23 PM

## 2025-02-07 NOTE — ED TRIAGE NOTES
Pt arrives from home by EMS. HX: End stage lung disease. Reports difficulty breathing the last 2-3 days, worse with movement, worse today. On O2 at home. When EMS arrived on scene pt was 65% on 15L NC. EMS reports O2 sat 85-88% on C-pap in route. Pt placed on bi-pap 50% FIO2 upon arrival. Sat's improved to 94%, labored breathing improving on Bi-pap.      Triage Assessment (Adult)       Row Name 02/07/25 1028          Triage Assessment    Airway WDL WDL        Respiratory WDL    Respiratory WDL X;rhythm/pattern     Rhythm/Pattern, Respiratory tachypneic;labored;shortness of breath        Skin Circulation/Temperature WDL    Skin Circulation/Temperature WDL WDL        Cardiac WDL    Cardiac WDL X  tachycardic        Peripheral/Neurovascular WDL    Peripheral Neurovascular WDL WDL        Cognitive/Neuro/Behavioral WDL    Cognitive/Neuro/Behavioral WDL WDL

## 2025-02-07 NOTE — H&P
[facility]  History and Physical  Hospitalist - Doroteo Moseley DO       Date of Admission:  2/7/2025    Chief Complaint   Shortness of breath    History is obtained from the patient, the patient's  and son at bedside, the emergency department physician, as well as electronic medical record.    History of Present Illness   Mary E Weiland is a 69 year old female with past medical history of end-stage idiopathic pulmonary fibrosis with severe pulmonary hypertension, chronic home oxygen dependence of 10 L high flow continuously at baseline, COPD, type 2 diabetes mellitus, hypertension who presented on 2/7/2025 with chief complaint of shortness of breath.  The patient states for the past 2 to 3 days she has had worsening shortness of breath.  She has been having increasing oxygen requirements at home as well.  She denies any upper respiratory symptoms but does admit to some chronic sinus issues.  She denies any chest pain or chest tightness.  She states she has plans to sign on with hospice but has not yet done that.  She states she was planning to do that last year and was hospitalized but then started feeling a bit better so put it off.  She is hoping to be hospitalized and get stable with plans to sign onto hospice.  Patient is confirmed DNR/DNI at bedside with  and son present.    In the emergency department, the patient was found to have a temperature of 98.8  F, heart rate 117, blood pressure 108/79, respiratory rate 37, SpO2 94% on continuous BiPAP.  Initial lab work showed proBNP 3935, high-sensitivity troponin 30, WBC 15.6.  COVID-19, influenza, RSV were negative.  Chest x-ray shows left lower lung consolidative opacity with air bronchograms with hazy opacities in the upper lungs with some reticulation with findings suspicious for multifocal pneumonitis.  The patient was started on ceftriaxone and azithromycin with concern for underlying community-acquired pneumonia.  The  patient was also started on IV Solu-Medrol in the setting of end-stage idiopathic pulmonary fibrosis.  Pulmonology was consulted to see the patient.    ASSESSMENT/PLAN    Acute on Chronic Hypoxic Respiratory Failure  Possible Community Acquired Pneumonia  End-Stage Pulmonary Fibrosis  Severe Pulmonary Artery Hypertension  NSTEMI - Likely Type 2  - At baseline pt uses 10L HF continuously  - Follows with Dr. Darling of Pulmonology  - IV Solumedrol 40 mg q8h, but consider high dose solumedrol pending pulmonary evaluation  - Appreciate Pulmonology recommendations regarding steroid dosing  - Lenin - pt reports some benefit in the past with these, so will schedule  - Ceftriaxone and Azithromycin  - Consider transition to HFNC later this evening if RR improves    Goals of Care  - Pt is confirmed DNR/DNI with pt and family at bedside.  Pt reports she previously had plans to sign of to hospice, but improved after a hospitalization so put it off.  She expressed interest in getting stabilized then signing on to hospice.  Will ask for social work assistance with arranging once pt has stabilized a bit more    Chronic Medical Problems:  Hypertension  Type 2 Diabetes Mellitus  COPD  Morbid Obesity - BMI 40.78    PLAN: Resume home medications as appropriate once confirmed by pharmacy.     I spent 53 minutes in reviewing this patient's labs, imaging, medications, medical history.  In addition time was spent interviewing the patient, communicating with family, and medical decision making.  Time was also spent reviewing notes of pulmonology, IM.    DVT Prophylaxis: Pneumatic Compression Devices  Code Status: DNR / DNI  Disposition: Medically Ready for Discharge: Anticipated in 2-4 Days    Goals to discharge include: respiratory status stabilized, hospice arranged    Doroteo Moseley DO  Securely message with [employer] (more info)  Text page via [employer] Paging/Directory     Primary Care Physician   [facility] -  [place]    -----------------------------------------------------------------------------------------------------------------------------------------------------------------------------------------------------    Past Medical History    I have reviewed this patient's medical history and updated it with pertinent information if needed.   Past Medical History:   Diagnosis Date    Acute on chronic respiratory failure with hypoxemia (H) 01/03/2024    Hypercholesteraemia     Hypertension     NOVA (obstructive sleep apnea) 06/14/2021       Past Surgical History   I have reviewed this patient's surgical history and updated it with pertinent information if needed.  Past Surgical History:   Procedure Laterality Date    D & C      DILATION AND CURETTAGE, HYSTEROSCOPY DIAGNOSTIC, COMBINED N/A 8/10/2015    Procedure: COMBINED DILATION AND CURETTAGE, HYSTEROSCOPY DIAGNOSTIC;  Surgeon: Scarlett Ridley MD;  Location:  OR       Prior to Admission Medications   Prior to Admission Medications   Prescriptions Last Dose Informant Patient Reported? Taking?   albuterol (PROAIR HFA/PROVENTIL HFA/VENTOLIN HFA) 108 (90 Base) MCG/ACT inhaler   No No   Sig: Inhale 2 puffs into the lungs every 4 hours as needed for shortness of breath, wheezing or cough Use the inhaler as needed while using the nebulized treatment 4x/day   diltiazem ER COATED BEADS (CARDIZEM CD/CARTIA XT) 300 MG 24 hr capsule   No No   Sig: TAKE 1 CAPSULE(300 MG) BY MOUTH DAILY   doxycycline hyclate (VIBRA-TABS) 100 MG tablet   No No   Sig: Take 1 tablet (100 mg) by mouth 2 times daily   fluticasone-vilanterol (BREO ELLIPTA) 200-25 MCG/INH inhaler   Yes No   Sig: Inhale 1 puff into the lungs daily   furosemide (LASIX) 40 MG tablet   No No   Sig: Take 1 tablet (40 mg) by mouth 2 times daily Patient taking 1 tablet twice a day   ipratropium - albuterol 0.5 mg/2.5 mg/3 mL (DUONEB) 0.5-2.5 (3) MG/3ML neb solution   No No   Sig: Take 1 vial (3 mLs) by nebulization 4 times  daily   irbesartan (AVAPRO) 300 MG tablet   No No   Sig: TAKE 1 TABLET(300 MG) BY MOUTH DAILY   omeprazole (PRILOSEC) 40 MG DR capsule   Yes No   Sig: Take 1 capsule (40 mg) by mouth daily   pirfenidone (ESBRIET) 267 MG capsule   Yes No   Sig: First week: one twice daily, one capsule 3 times per day for day 7-14, 2 capsules 3 times per day on day 15-30, then 3 capsules 3 times per day after day 30.   potassium chloride ER (K-TAB) 20 MEQ CR tablet   No No   Sig: Take 1 tablet (20 mEq) by mouth 2 times daily   predniSONE (DELTASONE) 20 MG tablet   No No   Sig: Take 1 tablet (20 mg) by mouth 2 times daily      Facility-Administered Medications: None     Allergies   Allergies   Allergen Reactions    No Known Allergies        Social History   I have reviewed this patient's social history and updated it with pertinent information if needed. Mary E Weiland  reports that she has never smoked. She has never used smokeless tobacco. She reports current alcohol use. She reports that she does not use drugs.    Family History   I have reviewed this patient's family history and updated it with pertinent information if needed.   Family History   Problem Relation Age of Onset    Esophageal Cancer Father     Diabetes Paternal Grandmother         Type II       -----------------------------------------------------------------------------------------------------------------------------------------------------------------------------------------------------    Review of Systems   The 10 point Review of Systems is negative other than noted in the HPI or here.     Physical Exam   Temp: 98.8  F (37.1  C) Temp src: Temporal BP: 101/70 Pulse: 102   Resp: (!) 33 SpO2: 94 % O2 Device: BiPAP/CPAP    Vital Signs with Ranges  Temp:  [98.8  F (37.1  C)] 98.8  F (37.1  C)  Pulse:  [102-128] 102  Resp:  [23-37] 33  BP: ()/(43-81) 101/70  FiO2 (%):  [40 %-50 %] 40 %  SpO2:  [93 %-97 %] 94 %  252 lbs 10.35 oz    Constitutional: Awake, alert,  cooperative, no apparent distress.  Eyes: Conjunctiva and pupils examined and normal.  HEENT: Moist mucous membranes, normal dentition.  Respiratory: Decreased BS bilaterally  Cardiovascular: Regular rate and rhythm, normal S1 and S2, and no murmur noted.  GI: Soft, non-distended, non-tender, normal bowel sounds.  Lymph/Hematologic: No anterior cervical or supraclavicular adenopathy.  Skin: No rashes, no cyanosis, no edema.  Musculoskeletal: No joint swelling, erythema or tenderness.  Neurologic: Cranial nerves 2-12 intact, normal strength and sensation.  Psychiatric: Alert, oriented to person, place and time, no obvious anxiety or depression.     Data     Recent Labs   Lab 02/07/25  1038   WBC 15.6*   HGB 13.8   MCV 92      INR 1.07      POTASSIUM 4.3   CHLORIDE 100   CO2 26   BUN 16.1   CR 0.70   ANIONGAP 11   GEOVANNY 9.2   *       Recent Results (from the past 24 hours)   XR Chest Port 1 View    Narrative    EXAM: XR CHEST PORT 1 VIEW  LOCATION: [facility]  DATE: 2/7/2025    INDICATION: sob  COMPARISON: Chest CT 1/3/2024      Impression    IMPRESSION: Left lower lung consolidative opacity with air bronchograms. Hazier opacities in the upper lungs with some reticulation. Findings most suspicious for multifocal pneumonitis. Follow-up chest x-ray in 6 weeks recommended. No significant pleural   effusion. No pneumothorax. Cardiac silhouette accentuated by technique but likely within normal limits.

## 2025-02-07 NOTE — PROGRESS NOTES
A BiPAP of  14/8 @ 50% was applied to the pt via the mask for an increase in WOB and SOB.  The skin in contact with the mask and straps looks good and intact. Pt is tolerating it well. RT will continue to monitor and assess the pt's respiratory status and needs. Duoneb x2 given inline with BiPAP  RT/RN huddle to discuss contraindications prior to placement? Y/N? Y

## 2025-02-07 NOTE — ED PROVIDER NOTES
Emergency Department Note      History of Present Illness     Chief Complaint   Respiratory Distress      HPI   Mary E Weiland is a 69 year old female with a history of advanced pulmonary fibrosis with chronic low oxygen, who presents via EMS for respiratory distress. EMS report patient comes from home with about 3 days of difficulty breathing with minimal relief from her BiPAP at night. Her shortness of breath worsens with movement and has not been standing at all for the past few days. Patient was found to have worsened shortness of breath this morning with 65% SpO2 while on 15 L nasal cannula at home. EMS report 85% SpO2 on CPAP. No meds given. BG was 172.   Patient denies any abdominal pain, chest pain or any other pain. She reports she would not like to be intubated.     Independent Historian   None    Review of External Notes   I reviewed an echo from 8/9/23. Normal function. I reviewed 1/6/25 note for medications. I reviewed 9/6/24 office visit with pulmonology. Patient had been on hospice but it was cancelled.     Past Medical History     Medical History and Problem List   Past Medical History:   Diagnosis Date    Acute on chronic respiratory failure with hypoxemia (H) 01/03/2024    Hypercholesteraemia     Hypertension     NOVA (obstructive sleep apnea) 06/14/2021       Medications   albuterol (PROAIR HFA/PROVENTIL HFA/VENTOLIN HFA) 108 (90 Base) MCG/ACT inhaler  diltiazem ER COATED BEADS (CARDIZEM CD/CARTIA XT) 300 MG 24 hr capsule  doxycycline hyclate (VIBRA-TABS) 100 MG tablet  fluticasone-vilanterol (BREO ELLIPTA) 200-25 MCG/INH inhaler  furosemide (LASIX) 40 MG tablet  ipratropium - albuterol 0.5 mg/2.5 mg/3 mL (DUONEB) 0.5-2.5 (3) MG/3ML neb solution  irbesartan (AVAPRO) 300 MG tablet  omeprazole (PRILOSEC) 40 MG DR capsule  pirfenidone (ESBRIET) 267 MG capsule  potassium chloride ER (K-TAB) 20 MEQ CR tablet  predniSONE (DELTASONE) 20 MG tablet        Surgical History   Past Surgical History:  "  Procedure Laterality Date    D & C      DILATION AND CURETTAGE, HYSTEROSCOPY DIAGNOSTIC, COMBINED N/A 8/10/2015    Procedure: COMBINED DILATION AND CURETTAGE, HYSTEROSCOPY DIAGNOSTIC;  Surgeon: Scarlett Ridley MD;  Location:  OR       Physical Exam     Patient Vitals for the past 24 hrs:   BP Temp Temp src Pulse Resp SpO2 Height Weight   02/07/25 1155 112/66 -- -- 112 26 95 % -- --   02/07/25 1126 94/43 -- -- 112 26 95 % -- --   02/07/25 1058 96/61 -- -- 112 (!) 31 95 % -- --   02/07/25 1034 108/79 -- -- (!) 128 (!) 35 97 % -- --   02/07/25 1031 108/79 98.8  F (37.1  C) Temporal 117 (!) 37 94 % 1.676 m (5' 6\") 114.6 kg (252 lb 10.4 oz)     Physical Exam  General: The patient is tachypneic on CPAP    HENT: No lip or tongue swelling    Cardiovascular: Tachycardic    Respiratory: Tachypnea with faint crackles    Gastrointestinal: Abdomen soft. No guarding,    Musculoskeletal: No gross deformity.     Skin: No rashes or petechiae.     Neurologic: The patient is alert able to speak and give one-word answers    Lymphatic:  lower extremity swelling.        Diagnostics     Lab Results   Labs Ordered and Resulted from Time of ED Arrival to Time of ED Departure   BASIC METABOLIC PANEL - Abnormal       Result Value    Sodium 137      Potassium 4.3      Chloride 100      Carbon Dioxide (CO2) 26      Anion Gap 11      Urea Nitrogen 16.1      Creatinine 0.70      GFR Estimate >90      Calcium 9.2      Glucose 147 (*)    TROPONIN T, HIGH SENSITIVITY - Abnormal    Troponin T, High Sensitivity 30 (*)    NT PROBNP INPATIENT - Abnormal    N terminal Pro BNP Inpatient 3,935 (*)    CBC WITH PLATELETS AND DIFFERENTIAL - Abnormal    WBC Count 15.6 (*)     RBC Count 4.43      Hemoglobin 13.8      Hematocrit 40.7      MCV 92      MCH 31.2      MCHC 33.9      RDW 12.1      Platelet Count 219      % Neutrophils 79      % Lymphocytes 13      % Monocytes 6      % Eosinophils 1      % Basophils 0      % Immature Granulocytes 0      " NRBCs per 100 WBC 0      Absolute Neutrophils 12.3 (*)     Absolute Lymphocytes 2.0      Absolute Monocytes 1.0      Absolute Eosinophils 0.2      Absolute Basophils 0.1      Absolute Immature Granulocytes 0.1      Absolute NRBCs 0.0     BLOOD GAS VENOUS - Abnormal    pH Venous 7.39      pCO2 Venous 50      pO2 Venous 31      Bicarbonate Venous 30 (*)     Base Excess/Deficit Venous 4.0 (*)     FIO2 50      Oxyhemoglobin Venous 58 (*)     O2 Sat, Venous 59.7 (*)    MAGNESIUM - Normal    Magnesium 1.9     INR - Normal    INR 1.07     INFLUENZA A/B, RSV AND SARS-COV2 PCR - Normal    Influenza A PCR Negative      Influenza B PCR Negative      RSV PCR Negative      SARS CoV2 PCR Negative     TROPONIN T, HIGH SENSITIVITY   BLOOD CULTURE   BLOOD CULTURE     Imaging   XR Chest Port 1 View   Final Result   IMPRESSION: Left lower lung consolidative opacity with air bronchograms. Hazier opacities in the upper lungs with some reticulation. Findings most suspicious for multifocal pneumonitis. Follow-up chest x-ray in 6 weeks recommended. No significant pleural    effusion. No pneumothorax. Cardiac silhouette accentuated by technique but likely within normal limits.        EKG   ECG taken at 1031, ECG read at 1045  Sinus tachycardia. Possible left atrial enlargement. Left possible fascicular block. Abnormal ECG.   Rate 115 bpm. ID interval 162 ms. QRS duration 80 ms. QT/QTc 316/437 ms. P-R-T axes 41 116 29.    Independent Interpretation   CXR: There is cardiomegaly and opacification of the left lower lobe.    ED Course      Medications Administered   Medications   sodium chloride 0.9 % infusion (has no administration in time range)   methylPREDNISolone Na Suc (solu-MEDROL) injection 125 mg (125 mg Intravenous $Given 2/7/25 1032)   azithromycin (ZITHROMAX) 500 mg in  mL intermittent infusion (500 mg Intravenous $New Bag 2/7/25 1052)   magnesium sulfate 2 g in 50 mL sterile water intermittent infusion (0 g Intravenous Stopped  2/7/25 1139)   sodium chloride 0.9% BOLUS 500 mL (0 mLs Intravenous Stopped 2/7/25 1139)   ipratropium - albuterol 0.5 mg/2.5 mg/3 mL (DUONEB) neb solution 6 mL (6 mLs Nebulization $Given 2/7/25 1033)     Procedures   Procedures     Discussion of Management   I discussed the case with Dr. Alan Jiménez the admitting hospitalist    ED Course   ED Course as of 02/07/25 1216   Fri Feb 07, 2025   1030 I obtained history and examined the patient as noted above.     1152 I rechecked and updated the patient.    1210 I spoke with Dr. Moseley, of the hospitalist team, regarding the patient. They will accept the patient for admission.             Medical Decision Making / Diagnosis       MDM   Mary E Weiland is a 69 year old female who has a known history of idiopathic pulmonary fibrosis but whose chart I reviewed quickly and does take nebs.  EMS reported that on CPAP her significant hypoxia improved.  The patient got even better on BiPAP.  I did consider could be CHF pneumonia PE anemia aspiration or other cause but felt that pulmonary fibrosis with bronchospasm is possible her BNP is elevated but I think this is more due to the primary lung issues.  I did hydrate her antibiotics were given on top of steroids nebs and magnesium which seemed to help for the most.  The patient and her family were all assessed and they discussed that she did not want to be intubated.  At this point she is stable on BiPAP for managing her blood pressure and she was admitted to hospital to [facility] in stable condition.    Disposition   The patient was admitted to the hospital.     Critical care time is 35 minutes and excluding procedures.  Diagnosis     ICD-10-CM    1. Acute and chronic respiratory failure with hypoxia (H)  J96.21       2. Idiopathic pulmonary fibrosis (H)  J84.112            Discharge Medications   New Prescriptions    No medications on file       Scribe Disclosure:  ILizett, am serving as a scribe at 10:43 AM on 2/7/2025 to  document services personally performed by Karel Dickson MD based on my observations and the provider's statements to me.        Karel Dickson MD  02/07/25 1213

## 2025-02-07 NOTE — PHARMACY-ADMISSION MEDICATION HISTORY
Pharmacist Admission Medication History    Admission medication history is complete. The information provided in this note is only as accurate as the sources available at the time of the update.    Information Source(s): Patient via in-person    Pertinent Information: Patient said she was switched from Esbriet to Ofev b/c insurance denied Esbriet despite 3 appeals from her physician.  She said she was started on a lower dose of Ofev d/t having increased liver enzymes when she was on it previously.    Changes made to PTA medication list:  Added: Ofev  Deleted: doxycycline, duoneb, Esbriet, prednisone  Changed: None    Allergies reviewed with patient and updates made in EHR: yes    Medication History Completed By: Eliazar Iqbal [facility] 2/7/2025 2:44 PM    PTA Med List   Medication Sig Note Last Dose/Taking    albuterol (PROAIR HFA/PROVENTIL HFA/VENTOLIN HFA) 108 (90 Base) MCG/ACT inhaler Inhale 2 puffs into the lungs every 4 hours as needed for shortness of breath, wheezing or cough Use the inhaler as needed while using the nebulized treatment 4x/day  Taking As Needed    diltiazem ER COATED BEADS (CARDIZEM CD/CARTIA XT) 300 MG 24 hr capsule TAKE 1 CAPSULE(300 MG) BY MOUTH DAILY  2/7/2025 Morning    fluticasone-vilanterol (BREO ELLIPTA) 200-25 MCG/INH inhaler Inhale 1 puff into the lungs daily  2/6/2025 Morning    furosemide (LASIX) 40 MG tablet Take 1 tablet (40 mg) by mouth 2 times daily Patient taking 1 tablet twice a day 2/7/2025: Takes at about 0800 and 1300 2/6/2025 Noon    irbesartan (AVAPRO) 300 MG tablet TAKE 1 TABLET(300 MG) BY MOUTH DAILY  2/7/2025 Morning    nintedanib (OFEV) 100 MG capsule Take 100 mg by mouth every other day.  2/6/2025 Morning    omeprazole (PRILOSEC) 40 MG DR capsule Take 1 capsule (40 mg) by mouth daily  2/7/2025 Morning    potassium chloride ER (K-TAB) 20 MEQ CR tablet Take 1 tablet (20 mEq) by mouth 2 times daily 2/7/2025: Takes with furosemide at about 0800 and 1300 2/6/2025 Noon

## 2025-02-08 LAB
ANION GAP SERPL CALCULATED.3IONS-SCNC: 12 MMOL/L (ref 7–15)
BUN SERPL-MCNC: 23.4 MG/DL (ref 8–23)
CALCIUM SERPL-MCNC: 9.4 MG/DL (ref 8.8–10.4)
CHLORIDE SERPL-SCNC: 102 MMOL/L (ref 98–107)
CREAT SERPL-MCNC: 0.63 MG/DL (ref 0.51–0.95)
CRP SERPL-MCNC: 73.18 MG/L
EGFRCR SERPLBLD CKD-EPI 2021: >90 ML/MIN/1.73M2
ERYTHROCYTE [DISTWIDTH] IN BLOOD BY AUTOMATED COUNT: 12.2 % (ref 10–15)
GLUCOSE SERPL-MCNC: 166 MG/DL (ref 70–99)
HCO3 SERPL-SCNC: 25 MMOL/L (ref 22–29)
HCT VFR BLD AUTO: 36.6 % (ref 35–47)
HGB BLD-MCNC: 12.5 G/DL (ref 11.7–15.7)
MCH RBC QN AUTO: 31 PG (ref 26.5–33)
MCHC RBC AUTO-ENTMCNC: 34.2 G/DL (ref 31.5–36.5)
MCV RBC AUTO: 91 FL (ref 78–100)
PLATELET # BLD AUTO: 204 10E3/UL (ref 150–450)
POTASSIUM SERPL-SCNC: 4.3 MMOL/L (ref 3.4–5.3)
RBC # BLD AUTO: 4.03 10E6/UL (ref 3.8–5.2)
SODIUM SERPL-SCNC: 139 MMOL/L (ref 135–145)
WBC # BLD AUTO: 13.9 10E3/UL (ref 4–11)

## 2025-02-08 PROCEDURE — 250N000009 HC RX 250: Performed by: INTERNAL MEDICINE

## 2025-02-08 PROCEDURE — 94660 CPAP INITIATION&MGMT: CPT

## 2025-02-08 PROCEDURE — 120N000001 HC R&B MED SURG/OB

## 2025-02-08 PROCEDURE — 86140 C-REACTIVE PROTEIN: CPT | Performed by: INTERNAL MEDICINE

## 2025-02-08 PROCEDURE — 999N000156 HC STATISTIC RCP CONSULT EA 30 MIN

## 2025-02-08 PROCEDURE — 250N000011 HC RX IP 250 OP 636: Mod: JW | Performed by: STUDENT IN AN ORGANIZED HEALTH CARE EDUCATION/TRAINING PROGRAM

## 2025-02-08 PROCEDURE — 250N000013 HC RX MED GY IP 250 OP 250 PS 637: Performed by: INTERNAL MEDICINE

## 2025-02-08 PROCEDURE — 85048 AUTOMATED LEUKOCYTE COUNT: CPT | Performed by: INTERNAL MEDICINE

## 2025-02-08 PROCEDURE — 94640 AIRWAY INHALATION TREATMENT: CPT | Mod: 76

## 2025-02-08 PROCEDURE — 36415 COLL VENOUS BLD VENIPUNCTURE: CPT | Performed by: INTERNAL MEDICINE

## 2025-02-08 PROCEDURE — 250N000011 HC RX IP 250 OP 636: Performed by: INTERNAL MEDICINE

## 2025-02-08 PROCEDURE — 999N000157 HC STATISTIC RCP TIME EA 10 MIN

## 2025-02-08 PROCEDURE — 82435 ASSAY OF BLOOD CHLORIDE: CPT | Performed by: INTERNAL MEDICINE

## 2025-02-08 PROCEDURE — 99232 SBSQ HOSP IP/OBS MODERATE 35: CPT | Performed by: INTERNAL MEDICINE

## 2025-02-08 RX ORDER — ENOXAPARIN SODIUM 100 MG/ML
40 INJECTION SUBCUTANEOUS EVERY 12 HOURS
Status: DISCONTINUED | OUTPATIENT
Start: 2025-02-08 | End: 2025-02-12 | Stop reason: HOSPADM

## 2025-02-08 RX ADMIN — IPRATROPIUM BROMIDE AND ALBUTEROL SULFATE 3 ML: .5; 3 SOLUTION RESPIRATORY (INHALATION) at 11:39

## 2025-02-08 RX ADMIN — DILTIAZEM HYDROCHLORIDE 300 MG: 180 CAPSULE, COATED, EXTENDED RELEASE ORAL at 09:07

## 2025-02-08 RX ADMIN — IPRATROPIUM BROMIDE AND ALBUTEROL SULFATE 3 ML: .5; 3 SOLUTION RESPIRATORY (INHALATION) at 08:20

## 2025-02-08 RX ADMIN — FUROSEMIDE 40 MG: 40 TABLET ORAL at 12:31

## 2025-02-08 RX ADMIN — CEFTRIAXONE 2 G: 2 INJECTION, POWDER, FOR SOLUTION INTRAMUSCULAR; INTRAVENOUS at 09:05

## 2025-02-08 RX ADMIN — ENOXAPARIN SODIUM 40 MG: 40 INJECTION SUBCUTANEOUS at 23:33

## 2025-02-08 RX ADMIN — AZITHROMYCIN DIHYDRATE 250 MG: 250 TABLET ORAL at 09:07

## 2025-02-08 RX ADMIN — FLUTICASONE FUROATE AND VILANTEROL TRIFENATATE 1 PUFF: 200; 25 POWDER RESPIRATORY (INHALATION) at 08:24

## 2025-02-08 RX ADMIN — IRBESARTAN 300 MG: 300 TABLET ORAL at 09:07

## 2025-02-08 RX ADMIN — POTASSIUM CHLORIDE 20 MEQ: 1500 TABLET, EXTENDED RELEASE ORAL at 12:31

## 2025-02-08 RX ADMIN — IPRATROPIUM BROMIDE AND ALBUTEROL SULFATE 3 ML: .5; 3 SOLUTION RESPIRATORY (INHALATION) at 16:31

## 2025-02-08 RX ADMIN — POTASSIUM CHLORIDE 20 MEQ: 1500 TABLET, EXTENDED RELEASE ORAL at 09:07

## 2025-02-08 RX ADMIN — METHYLPREDNISOLONE SODIUM SUCCINATE 62.5 MG: 125 INJECTION, POWDER, FOR SOLUTION INTRAMUSCULAR; INTRAVENOUS at 18:47

## 2025-02-08 RX ADMIN — FUROSEMIDE 40 MG: 40 TABLET ORAL at 09:07

## 2025-02-08 RX ADMIN — IPRATROPIUM BROMIDE AND ALBUTEROL SULFATE 3 ML: .5; 3 SOLUTION RESPIRATORY (INHALATION) at 21:52

## 2025-02-08 RX ADMIN — PANTOPRAZOLE SODIUM 40 MG: 40 TABLET, DELAYED RELEASE ORAL at 09:06

## 2025-02-08 RX ADMIN — ENOXAPARIN SODIUM 40 MG: 40 INJECTION SUBCUTANEOUS at 12:31

## 2025-02-08 RX ADMIN — METHYLPREDNISOLONE SODIUM SUCCINATE 62.5 MG: 125 INJECTION, POWDER, FOR SOLUTION INTRAMUSCULAR; INTRAVENOUS at 09:06

## 2025-02-08 ASSESSMENT — ACTIVITIES OF DAILY LIVING (ADL)
ADLS_ACUITY_SCORE: 60
ADLS_ACUITY_SCORE: 60
ADLS_ACUITY_SCORE: 62
ADLS_ACUITY_SCORE: 60
ADLS_ACUITY_SCORE: 60
ADLS_ACUITY_SCORE: 62
ADLS_ACUITY_SCORE: 60
ADLS_ACUITY_SCORE: 62
ADLS_ACUITY_SCORE: 60
ADLS_ACUITY_SCORE: 60
ADLS_ACUITY_SCORE: 62
ADLS_ACUITY_SCORE: 62
ADLS_ACUITY_SCORE: 60
ADLS_ACUITY_SCORE: 62
ADLS_ACUITY_SCORE: 60
ADLS_ACUITY_SCORE: 62
ADLS_ACUITY_SCORE: 60
ADLS_ACUITY_SCORE: 62

## 2025-02-08 NOTE — CONSULTS
[facility]    Pulmonary, Allergy, Critical Care and Sleep Medicine    Initial Consultation  02/07/2025    Mary E Weiland MRN# [id number]   Age: 69 year old YOB: 1955     Date of Admission: 2/7/2025  Reason for Consultation: End stage pulmonary fibrosis, steroid dosing     Assessment and Recommendations:    Mary E Weiland is a 69 year old female with a history of IPF and pulmonary hypertension who presented on 2/7/2025 with progressive dyspnea and increased oxygen needs.      Acute on Chronic Hypoxic Respiratory Failure  Reports increase in dyspnea 2-3 days ago. 65% on 15L prior to arrival in ED and placed on BiPAP. Now with stable sats in 90s on BiPAP at 40%. CXR does show LLL consolidation with air bronchograms. Limited viral panel negative, will send full panel, try to collect sputum for culture. 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 Did discuss patient's hope to return home, not die in the hospital. Discussed that will treat with antibiotics and steroids for now in an effort to improve condition but this could be difficult.   - Agree with trial of steroids to see if improves respiratory status in effort to discharge home for enrollment in hospice, currently at methylpred 40 mg Q8H, will adjust to 60 mg BID so that not giving overnight  - Discussed may not be able to wean off high levels of support and if lack of improvement may benefit from discussion with Palliative Care  - Confirmed with patient her DNR/DNI status  - Agree with empiric antibiotics, would broaden if decompensates  - Sending galactomannan, full RVP, MRSA swab  - Trend CRP   - BNP up, monitor volume status with adjustment of home lasix as needed  - If producing sputum would try to collect for culture  - Less likely  with infiltrate on CXR but given acute onset of symptoms and reported decrease in mobility would consider workup for PE if not improving    Basia Barahona MD PhD   of Medicine  Pulmonary Critical Care   [facility]    For [place] [facility] [place], Pulmonary is in house or available by page from 7 am to 6 pm Monday-Friday. For assistance at other times page the on-call pulmonologist through [facility] or the .    History of Present Illness:    History taken from patient and chart review.     Follows with Dr. Darling, last seen in clinic in early January. Has benefited from OFEV in the past but had rise in liver enzymes. Tried to start Esbriet but insurance did not approve. Appears was using lower dose OFEV of 100 mg every other day. At last visit was to increase O2 to 12-15 L with activity, 10-15L with rest. Patient reports having more trouble with breathing. Has not really been able to get around home to do things lately. Has been taking Lasix consistently, has had times in past where skipped is was going to an appointment. Denies any new swelling or redness in legs. No recent sick contacts. Does report responding to steroid tapers in the past.     Review of Systems:  Complete 12 point ROS negative unless mentioned in HPI    Histories, Prior to Admission Medications, Allergies:    Past Medical History:  Past Medical History:   Diagnosis Date    Acute on chronic respiratory failure with hypoxemia (H) 01/03/2024    Hypercholesteraemia     Hypertension     NOVA (obstructive sleep apnea) 06/14/2021     Past Surgical History:  Past Surgical History:   Procedure Laterality Date    D & C      DILATION AND CURETTAGE, HYSTEROSCOPY DIAGNOSTIC, COMBINED N/A 8/10/2015    Procedure: COMBINED DILATION AND CURETTAGE, HYSTEROSCOPY DIAGNOSTIC;  Surgeon: Scarlett Ridley MD;  Location:  OR     Past Social History:  Social History     Socioeconomic History    Marital status:      Spouse  name: Not on file    Number of children: Not on file    Years of education: Not on file    Highest education level: Not on file   Occupational History    Not on file   Tobacco Use    Smoking status: Never    Smokeless tobacco: Never   Vaping Use    Vaping status: Never Used   Substance and Sexual Activity    Alcohol use: Yes     Comment: Occasion    Drug use: No    Sexual activity: Yes     Partners: Male   Other Topics Concern    Not on file   Social History Narrative    Not on file     Social Drivers of Health     Financial Resource Strain: Low Risk  (1/22/2024)    Financial Resource Strain     Within the past 12 months, have you or your family members you live with been unable to get utilities (heat, electricity) when it was really needed?: No   Food Insecurity: Low Risk  (1/22/2024)    Food Insecurity     Within the past 12 months, did you worry that your food would run out before you got money to buy more?: No     Within the past 12 months, did the food you bought just not last and you didn t have money to get more?: No   Transportation Needs: Low Risk  (1/22/2024)    Transportation Needs     Within the past 12 months, has lack of transportation kept you from medical appointments, getting your medicines, non-medical meetings or appointments, work, or from getting things that you need?: No   Physical Activity: Inactive (6/7/2022)    Received from [facility], [facility]    Exercise Vital Sign     Days of Exercise per Week: 0 days     Minutes of Exercise per Session: 0 min     : Not on file     : Not on file     : Not on file     : Not on file   Stress: Stress Concern Present (6/7/2022)    Received from [facility], [facility]    [employer] [place] [employer] - Occupational Stress Questionnaire     Feeling of Stress : To some extent     : Not on file     : Not on file     : Not on file     : Not on file     : Not on file   Social Connections: Unknown (9/6/2024)    Received from [facility]    Social Connections     Frequency of Communication with Friends and Family: Not on file   Interpersonal Safety: Low Risk  (6/20/2024)    Interpersonal Safety     Do you feel physically and emotionally safe where you currently live?: Yes     Within the past 12 months, have you been hit, slapped, kicked or otherwise physically hurt by someone?: No     Within the past 12 months, have you been humiliated or emotionally abused in other ways by your partner or ex-partner?: No   Housing Stability: Low Risk  (1/22/2024)    Housing Stability     Do you have housing? : Yes     Are you worried about losing your housing?: No     Family History:  Family History   Problem Relation Age of Onset    Esophageal Cancer Father     Diabetes Paternal Grandmother         Type II     Medications:  Current Facility-Administered Medications   Medication Dose Route Frequency Provider Last Rate Last Admin    [START ON 2/8/2025] azithromycin (ZITHROMAX) tablet 250 mg  250 mg Oral Daily Doroteo Moseley DO        cefTRIAXone (ROCEPHIN) 2 g vial to attach to  ml bag for ADULTS or NS 50 ml bag for PEDS  2 g Intravenous Q24H CARY Doroteo Moseley DO   2 g at 02/07/25 1704    [START ON 2/8/2025] diltiazem ER COATED BEADS (CARDIZEM CD/CARTIA XT) 24 hr capsule 300 mg  300 mg Oral Daily Doroteo Moseley DO        fluticasone-vilanterol (BREO ELLIPTA) 200-25 MCG/ACT inhaler 1 puff  1 puff Inhalation Daily Doroteo Moseley DO        furosemide (LASIX) tablet 40 mg  40 mg Oral BID Doroteo Moseley DO   40 mg at 02/07/25 1725    ipratropium - albuterol 0.5 mg/2.5 mg/3 mL (DUONEB) neb solution 3 mL  3 mL Nebulization 4x daily Doroteo Moseley DO   3 mL at 02/07/25 1652    [START ON 2/8/2025] irbesartan (AVAPRO) tablet 300 mg  300 mg Oral Daily Doroteo Moseley DO        methylPREDNISolone sodium succinate (SOLU-MEDROL) injection 40 mg  40 mg Intravenous Q8H Doroteo Moseley DO        [START  ON 2/8/2025] nintedanib (OFEV) capsule 100 mg  100 mg Oral Every Other Day Doroteo Moseley DO        [START ON 2/8/2025] pantoprazole (PROTONIX) EC tablet 40 mg  40 mg Oral BID Doroteo Moseley DO        potassium chloride [place] ER (KLOR-CON M20) CR tablet 20 mEq  20 mEq Oral BID Doroteo Moseley DO   20 mEq at 02/07/25 1725    sodium chloride (PF) 0.9% PF flush 3 mL  3 mL Intracatheter Q8H Doroteo Moseley DO        sodium chloride (PF) 0.9% PF flush 3 mL  3 mL Intracatheter Q8H Doroteo Moseley DO         Current Facility-Administered Medications   Medication Dose Route Frequency Provider Last Rate Last Admin    acetaminophen (TYLENOL) tablet 650 mg  650 mg Oral Q4H PRN Doroteo Moseley DO        Or    acetaminophen (TYLENOL) Suppository 650 mg  650 mg Rectal Q4H PRN Doroteo Moseley DO        calcium carbonate (TUMS) chewable tablet 1,000 mg  1,000 mg Oral 4x Daily PRN Doroteo Moseley DO        carboxymethylcellulose PF (REFRESH PLUS) 0.5 % ophthalmic solution 1 drop  1 drop Both Eyes Q1H PRN Doroteo Moseley DO        ipratropium - albuterol 0.5 mg/2.5 mg/3 mL (DUONEB) neb solution 3 mL  3 mL Nebulization Q4H PRN Doroteo Moseley DO        lidocaine (LMX4) cream   Topical Q1H PRN Doroteo Moseley DO        lidocaine 1 % 0.1-1 mL  0.1-1 mL Other Q1H PRN Doroteo Moseley DO        No lozenges or gum should be given while patient on BIPAP/AVAPS/AVAPS AE   Does not apply Continuous PRN Doroteo Moseley DO        ondansetron (ZOFRAN ODT) ODT tab 4 mg  4 mg Oral Q6H PRN Doroteo Moseley DO        Or    ondansetron (ZOFRAN) injection 4 mg  4 mg Intravenous Q6H PRN Doroteo Moseley DO        Patient may continue current oral medications   Does not apply Continuous PRN Doroteo Moseley DO        polyethylene glycol (MIRALAX) Packet 17 g  17 g Oral BID PRN Doroteo Moseley DO        senna-docusate (SENOKOT-S/PERICOLACE) 8.6-50 MG per  tablet 1 tablet  1 tablet Oral BID PRN Doroteo Moseley,         Or    senna-docusate (SENOKOT-S/PERICOLACE) 8.6-50 MG per tablet 2 tablet  2 tablet Oral BID PRN Doroteo Moseley, DO        sodium chloride (PF) 0.9% PF flush 3 mL  3 mL Intracatheter q1 min prn Doroteo Moseley, DO        sodium chloride (PF) 0.9% PF flush 3 mL  3 mL Intracatheter q1 min prn Doroteo Moseley, DO         Allergies:     Allergies   Allergen Reactions    No Known Allergies      Physical Exam:    Temp:  [98.8  F (37.1  C)] 98.8  F (37.1  C)  Pulse:  [] 99  Resp:  [23-37] 27  BP: ()/(43-81) 110/69  FiO2 (%):  [40 %-50 %] 40 %  SpO2:  [92 %-100 %] 97 %  No intake or output data in the 24 hours ending 02/07/25 1911    General: laying in bed in ED on BiPAP, NAD  HEENT: anicteric, moist mucosa, BiPAP mask in place  Chest: Somewhat diminished breath sounds, unable to listen fully on posterior, did appear to have some crackles, no wheezing, talking in full sentences on BiPAP  Cardiac: RRR no murmurs  Abdomen: Soft, flat, non tender, active BS  Extremities: No significant LE Edema  Neuro: A&Ox3, no focal deficits   Skin: no rash noted    Laboratory, imaging, and microbiologic data:    All laboratory and imaging data reviewed, pertinent results discussed above.

## 2025-02-08 NOTE — PROVIDER NOTIFICATION
02/07/25 5877   Tech Time   $Tech Time (10 minute increments) 3   Mode: CPAP/ BiPAP/ AVAPS/ AVAPS AE   CPAP/BiPAP/ AVAPS/ AVAPS AE Mode BiPAP S/T   Equipment   Device v60   Device Serial Number [id number]   CPAP/BiPAP/Settings   BIPAP/CPAP On Standby On   IPAP/EPAP (cmH2O) 12/7   Rate (breaths/min) 5   Oxygen (%) 40   Timed Inspiration (sec) 1   IPAP RISE  Settings (V60) 3   CPAP/BiPAP Patient Parameters   IPAP (cm H2O) 12 cmH2O   EPAP (cm H2O) 7 cmH2O   Pressure Support (cm H2O) 5 cmH2O   RR Total (breaths/min) 21 breaths/min   Vt (mL) 725 mL   Minute Ventilation (L/min) 15.1 L/min   Peak Inspiratory Pressure (cm H2O) 13 cmH2O   Pt.  Leak (L/min) 13 L/min   CPAP/BiPAP/AVAPS/AVAPS AE Alarms   High Pressure (cm H2O) 25 cmH2O   Low Pressure (cm H2O) 5   Low Pressure Delay (sec) 20 sec   Lo Min Vent 2   High Rate (breaths/min) 50 breaths/min   Low Rate (breaths/min) 8   Audible Alarm set at (Volume of Alarm) 7   Humidifier Checked N/A   RT Device Skin Assessment   Oxygen Delivery Device CPAP/BiPAP Mask   Interface Under-nose Mask - Medium   Ventilator Arm In Place No   Site Appearance neck circumference Clean and dry   Site Appearance bridge of nose Clean and dry   Site Appearance occiput Clean and dry   Strap Tightness Finger Allowance between head and device strap   Device Skin Interventions Taken Device type changed   Respiratory WDL   Respiratory WDL X   Rhythm/Pattern, Respiratory assisted mechanically   Expansion/Accessory Muscles/Retractions expansion symmetric;no retractions;no use of accessory muscles     Ermias Solano, RT on 2/7/2025 at 11:19 PM

## 2025-02-08 NOTE — PLAN OF CARE
"Care from 19:00-07:30  Inpatient Progress Note - MS3  For vital signs and complete assessments, please see documentation flowsheets.      IMC on BiPap.    ORIENTATION: AO.  VSS.  PAIN: Denies pain, CP, some SOB, denies n/v.  O2: BiPap 40% FiO2 overnight.  TELE: SR w/ ST depression.  GI/: Purewick in place. Pt had some trouble initiating to void, bladder scanned for 508 at 4am, pt was able to spontaneously void at a few intervals with the purewick after this.  SKIN: Mepilex to heels.  ACTIVITY: Not OOB, A2.   DIET: NPO.  LINES/DRAINS: R and L PIV SL.  PLAN: Pulmonology following. On IV Solumedrol.    Goal Outcome Evaluation:      Plan of Care Reviewed With: patient    Overall Patient Progress: improvingOverall Patient Progress: improving    Outcome Evaluation: Transitioned off BiPap to 15L Oxymizer in the evening, satting 95%, pt reported breathing was comfortable and felt more baseline. Switched back to BiPAP at HS, sleeping between cares.    Problem: Adult Inpatient Plan of Care  Goal: Plan of Care Review  Description: The Plan of Care Review/Shift note should be completed every shift.  The Outcome Evaluation is a brief statement about your assessment that the patient is improving, declining, or no change.  This information will be displayed automatically on your shift  note.  Outcome: Progressing  Flowsheets (Taken 2/8/2025 0113)  Outcome Evaluation: Transitioned off BiPap to 15L Oxymizer in the evening, satting 95%, pt reported breathing was comfortable and felt more baseline. Switched back to BiPAP at HS, sleeping between cares.  Plan of Care Reviewed With: patient  Overall Patient Progress: improving  Goal: Patient-Specific Goal (Individualized)  Description: You can add care plan individualizations to a care plan. Examples of Individualization might be:  \"Parent requests to be called daily at 9am for status\", \"I have a hard time hearing out of my right ear\", or \"Do not touch me to wake me up as it " "startles  me\".  Outcome: Progressing  Goal: Absence of Hospital-Acquired Illness or Injury  Outcome: Progressing  Intervention: Identify and Manage Fall Risk  Recent Flowsheet Documentation  Taken 2/8/2025 0023 by Danette Barron RN  Safety Promotion/Fall Prevention:   safety round/check completed   supervised activity   room near nurse's station   room organization consistent   room door open   patient and family education   nonskid shoes/slippers when out of bed   mobility aid in reach   lighting adjusted   increase visualization of patient   increased rounding and observation   clutter free environment maintained   assistive device/personal items within reach   activity supervised  Taken 2/7/2025 2236 by Danette Barron RN  Safety Promotion/Fall Prevention:   safety round/check completed   supervised activity   room near nurse's station   room organization consistent   room door open   patient and family education   nonskid shoes/slippers when out of bed   mobility aid in reach   lighting adjusted   increase visualization of patient   increased rounding and observation   clutter free environment maintained   assistive device/personal items within reach   activity supervised  Intervention: Prevent Skin Injury  Recent Flowsheet Documentation  Taken 2/8/2025 0023 by Danette Barron RN  Body Position: position changed independently  Skin Protection:   adhesive use limited   incontinence pads utilized   pulse oximeter probe site changed  Taken 2/7/2025 2236 by Danette Barron RN  Body Position: position changed independently  Skin Protection:   adhesive use limited   incontinence pads utilized   pulse oximeter probe site changed  Intervention: Prevent and Manage VTE (Venous Thromboembolism) Risk  Recent Flowsheet Documentation  Taken 2/8/2025 0023 by Danette Barron RN  VTE Prevention/Management: SCDs off (sequential compression devices)  Taken 2/7/2025 2236 by Danette Barron RN  VTE " Prevention/Management: SCDs off (sequential compression devices)  Intervention: Prevent Infection  Recent Flowsheet Documentation  Taken 2/8/2025 0023 by Danette Barron, RN  Infection Prevention:   single patient room provided   rest/sleep promoted   personal protective equipment utilized  Taken 2/7/2025 2236 by Danette Barron, RN  Infection Prevention:   single patient room provided   rest/sleep promoted   personal protective equipment utilized  Goal: Optimal Comfort and Wellbeing  Outcome: Progressing  Goal: Readiness for Transition of Care  Outcome: Progressing  Intervention: Mutually Develop Transition Plan  Recent Flowsheet Documentation  Taken 2/7/2025 2236 by Danette Barron, RN  Equipment Currently Used at Home:   grab bar, toilet   grab bar, tub/shower   wheelchair, manual   transfer board   shower chair

## 2025-02-08 NOTE — PLAN OF CARE
"Alert and oriented x4. VSS. 2 PIV SL. Tele: SR; discontinued today. IMC discontinued this shift. Was able to tolerate Oxymizer at 12 LPM; this is baseline. After nap this afternoon, RT took off bipap and she tolerated oxymizer for approx 20 min. before stating she was SOB and O2 in the upper 80's. Bipap reapplied with relief.  at bedside this afternoon. Regular diet; NPO while on bipap. Assist of two pivot transfer to commode. W/C bound at baseline. Refused repositioning, unable to tolerate HOB down. Purewick in place. Minimal output. Bladder scanned         Goal Outcome Evaluation:      Plan of Care Reviewed With: patient    Overall Patient Progress: improvingOverall Patient Progress: improving    Outcome Evaluation: Tolerating Oxymizer on baseline O2.      Problem: Adult Inpatient Plan of Care  Goal: Plan of Care Review  Description: The Plan of Care Review/Shift note should be completed every shift.  The Outcome Evaluation is a brief statement about your assessment that the patient is improving, declining, or no change.  This information will be displayed automatically on your shift  note.  Outcome: Progressing  Flowsheets (Taken 2/8/2025 1620)  Outcome Evaluation: Tolerating Oxymizer on baseline O2.  Plan of Care Reviewed With: patient  Overall Patient Progress: improving  Goal: Patient-Specific Goal (Individualized)  Description: You can add care plan individualizations to a care plan. Examples of Individualization might be:  \"Parent requests to be called daily at 9am for status\", \"I have a hard time hearing out of my right ear\", or \"Do not touch me to wake me up as it startles  me\".  Outcome: Progressing  Goal: Absence of Hospital-Acquired Illness or Injury  Outcome: Progressing  Intervention: Identify and Manage Fall Risk  Flowsheets  Taken 2/8/2025 1620  Safety Promotion/Fall Prevention: safety round/check completed  Taken 2/8/2025 3258  Safety Promotion/Fall Prevention: safety round/check " Pt in CT scan.   completed  Intervention: Prevent Skin Injury  Flowsheets  Taken 2/8/2025 1620  Body Position:   supine, head elevated   weight shifting  Skin Protection:   adhesive use limited   tubing/devices free from skin contact  Taken 2/8/2025 0900  Body Position:   supine, head elevated   weight shifting  Taken 2/8/2025 0851  Body Position:   supine, head elevated   supine, legs elevated   upper extremity elevated   weight shifting  Skin Protection:   adhesive use limited   tubing/devices free from skin contact  Intervention: Prevent and Manage VTE (Venous Thromboembolism) Risk  Flowsheets  Taken 2/8/2025 1620  VTE Prevention/Management: SCDs off (sequential compression devices)  Taken 2/8/2025 0851  VTE Prevention/Management: SCDs off (sequential compression devices)  Intervention: Prevent Infection  Flowsheets  Taken 2/8/2025 1620  Infection Prevention:   rest/sleep promoted   single patient room provided  Taken 2/8/2025 0851  Infection Prevention:   rest/sleep promoted   single patient room provided  Goal: Optimal Comfort and Wellbeing  Outcome: Progressing  Intervention: Monitor Pain and Promote Comfort  Flowsheets (Taken 2/8/2025 1620)  Pain Management Interventions:   care clustered   emotional support  Intervention: Provide Person-Centered Care  Flowsheets (Taken 2/8/2025 1620)  Trust Relationship/Rapport:   care explained   emotional support provided   thoughts/feelings acknowledged   reassurance provided   questions encouraged   questions answered  Goal: Readiness for Transition of Care  Outcome: Progressing     Problem: Comorbidity Management  Goal: Maintenance of COPD Symptom Control  Outcome: Progressing  Intervention: Maintain COPD Symptom Control  Flowsheets  Taken 2/8/2025 1620  Breathing Techniques/Airway Clearance: pursed-lip breathing encouraged  Medication Review/Management:   medications reviewed   high-risk medications identified  Taken 2/8/2025 0851  Breathing Techniques/Airway Clearance: pursed-lip  breathing encouraged  Medication Review/Management:   medications reviewed   high-risk medications identified  Goal: Blood Pressure in Desired Range  Outcome: Progressing  Intervention: Maintain Blood Pressure Management  Flowsheets  Taken 2/8/2025 1620  Medication Review/Management:   medications reviewed   high-risk medications identified  Taken 2/8/2025 0851  Medication Review/Management:   medications reviewed   high-risk medications identified     Problem: Skin Injury Risk Increased  Goal: Skin Health and Integrity  Outcome: Progressing  Intervention: Plan: Nurse Driven Intervention: Positioning  Flowsheets (Taken 2/8/2025 1620)  Plan: Positioning Interventions: REPOSITION Left/Right (No supine) q2h  Intervention: Plan: Nurse Driven Intervention: Moisture Management  Flowsheets  Taken 2/8/2025 1620  Moisture Interventions: Urinary collection device  Bathing/Skin Care:   foot care   moisturizer applied  Incontinence Protocol in Use: Active  Plan: Moisture Management:   incontinence pad   urinary collection device  Taken 2/8/2025 0851  Moisture Interventions: Urinary collection device  Taken 2/8/2025 0800  Moisture Interventions: Urinary collection device  Intervention: Plan: Nurse Driven Intervention: Friction and Shear  Flowsheets  Taken 2/8/2025 1620  Friction/Shear Interventions: HOB 30 degrees or less  Taken 2/8/2025 0851  Friction/Shear Interventions: HOB 30 degrees or less  Intervention: Optimize Skin Protection  Flowsheets  Taken 2/8/2025 1620  Pressure Reduction Techniques:   frequent weight shift encouraged   heels elevated off bed  Pressure Reduction Devices: heel offloading device utilized  Skin Protection:   adhesive use limited   tubing/devices free from skin contact  Activity Management: activity adjusted per tolerance  Head of Bed (HOB) Positioning:   HOB at 45 degrees   HOB at 30-45 degrees  Taken 2/8/2025 0900  Activity Management: activity adjusted per tolerance  Head of Bed (HOB)  Positioning:   HOB at 45 degrees   HOB at 30-45 degrees  Taken 2/8/2025 1572  Pressure Reduction Techniques:   frequent weight shift encouraged   heels elevated off bed  Pressure Reduction Devices: heel offloading device utilized  Skin Protection:   adhesive use limited   tubing/devices free from skin contact  Activity Management: activity adjusted per tolerance  Head of Bed (HOB) Positioning:   HOB at 45 degrees   HOB at 60 degrees  Intervention: Promote and Optimize Oral Intake  Flowsheets (Taken 2/8/2025 1620)  Oral Nutrition Promotion: rest periods promoted

## 2025-02-08 NOTE — PROVIDER NOTIFICATION
MD notified:  RT took her off bipap when waking up from nap. Tolerated for approx. 15-20 min before O2 dropped to 87% and she felt SOB. She is back on bipap now.

## 2025-02-08 NOTE — PROGRESS NOTES
[facility]    Hospitalist Progress Note  Name: Mary E Weiland    MRN: [id number]  Provider:  Doroteo Moseley DO  Date of Service: 02/08/2025    Summary of Stay: Mary E Weiland is a 69 year old female with past medical history of end-stage idiopathic pulmonary fibrosis with severe pulmonary hypertension, chronic home oxygen dependence of 10 L high flow continuously at baseline, COPD, type 2 diabetes mellitus, hypertension who presented on 2/7/2025 with chief complaint of shortness of breath.  In the emergency department, the patient was found to have a temperature of 98.8  F, heart rate 117, blood pressure 108/79, respiratory rate 37, SpO2 94% on continuous BiPAP. Initial lab work showed proBNP 3935, high-sensitivity troponin 30, WBC 15.6. COVID-19, influenza, RSV were negative. Chest x-ray shows left lower lung consolidative opacity with air bronchograms with hazy opacities in the upper lungs with some reticulation with findings suspicious for multifocal pneumonitis. The patient was started on ceftriaxone and azithromycin with concern for underlying community-acquired pneumonia. The patient was also started on IV Solu-Medrol in the setting of end-stage idiopathic pulmonary fibrosis. Pulmonology was consulted to see the patient.     TODAY'S PLAN: Appreciate pulmonology recommendations.  Patient doing better today.  She is feeling better off BiPAP and is back on high flow oxygen.  She denies any cough that is worse than normal.  Continue ceftriaxone and azithromycin, IV Solu-Medrol, DuoNebs.  Plan is to give the patient the weekend to see if she improves with the above treatments.  If no improvement then would recommend palliative care approach with plan for discharge home with hospice.  Patient states she has hospice arranged at home she just has not called them to started.  She would prefer to discharge home and she will call on her own to set that up.  Tentative plan is for discharge home on  Monday if continued improvement over the weekend.   at bedside.  All questions answered.    Problem List:   Acute on Chronic Hypoxic Respiratory Failure  Possible Community Acquired Pneumonia  End-Stage Pulmonary Fibrosis  Severe Pulmonary Artery Hypertension  NSTEMI - Likely Type 2  - At baseline pt uses 10L HF continuously  - Follows with Dr. Darling of Pulmonology  - IV Solumedrol 40 mg q8h, but consider high dose solumedrol pending pulmonary evaluation  - Appreciate Pulmonology recommendations regarding steroid dosing  - Duonebs - pt reports some benefit in the past with these, so will schedule  - Ceftriaxone and Azithromycin     Goals of Care  - Pt is confirmed DNR/DNI with pt and family at bedside.  Pt reports she previously had plans to sign of to hospice, but improved after a hospitalization so put it off.  She expressed interest in getting stabilized then signing on to hospice.  Pt reports hospice is arranged at home, she just needs to contact them to set it up.  She plans to do this when she gets home from the hospital.     Chronic Medical Problems:  Hypertension  Type 2 Diabetes Mellitus  COPD  Morbid Obesity - BMI 40.78    I spent 46 minutes in reviewing this patient's labs, imaging, medications, medical history.  In addition time was spent interviewing the patient, communicating with family, and medical decision making.      DVT Prophylaxis: Enoxaparin (Lovenox) SQ  Code Status: No CPR- Do NOT Intubate  Diet: NPO for Medical/Clinical Reasons Except for: Meds, Ice Chips    Hernandez Catheter: Not present    Disposition: Medically Ready for Discharge: Anticipated in 2-4 Days    Goals to discharge include: oxygen requirements improved  Family updated today: Yes      Interval History   Pt seen and examined.  Pt reports feeling a bit better today.    -Data reviewed today: I personally reviewed all new labs and imaging results over the last 24 hours.     Physical Exam   Temp: 97.9  F (36.6  C) Temp src:  Axillary BP: 125/85 Pulse: 92   Resp: 20 SpO2: 92 % O2 Device: Oxymizer cannula Oxygen Delivery: 12 LPM  Vitals:    02/07/25 1031   Weight: 114.6 kg (252 lb 10.4 oz)     Vital Signs with Ranges  Temp:  [96.3  F (35.7  C)-98.4  F (36.9  C)] 97.9  F (36.6  C)  Pulse:  [] 92  Resp:  [16-33] 20  BP: ()/(58-95) 125/85  FiO2 (%):  [40 %] 40 %  SpO2:  [92 %-100 %] 92 %  I/O last 3 completed shifts:  In: 0   Out: 550 [Urine:550]    GENERAL: No apparent distress. Awake, alert, and fully oriented.  HEENT: Normocephalic, atraumatic. Extraocular movements intact.  CARDIOVASCULAR: Regular rate and rhythm without murmurs or rubs. No S3.  PULMONARY: Fine crackles on R  GASTROINTESTINAL: Soft, non-tender, non-distended. Bowel sounds normoactive.   EXTREMITIES: No cyanosis or clubbing. No edema.  NEUROLOGICAL: CN 2-12 grossly intact, no focal neurological deficits.  DERMATOLOGICAL: No rash, ulcer, bruising, nor jaundice.    Medications   Current Facility-Administered Medications   Medication Dose Route Frequency Provider Last Rate Last Admin    No lozenges or gum should be given while patient on BIPAP/AVAPS/AVAPS AE   Does not apply Continuous PRN Doroteo Moseley DO        Patient may continue current oral medications   Does not apply Continuous PRN Doroteo Moseley DO         Current Facility-Administered Medications   Medication Dose Route Frequency Provider Last Rate Last Admin    azithromycin (ZITHROMAX) tablet 250 mg  250 mg Oral Daily Doroteo Moseley DO   250 mg at 02/08/25 0907    cefTRIAXone (ROCEPHIN) 2 g vial to attach to  ml bag for ADULTS or NS 50 ml bag for PEDS  2 g Intravenous Q24H CARY Doroteo Moseley DO   2 g at 02/08/25 0905    diltiazem ER COATED BEADS (CARDIZEM CD/CARTIA XT) 24 hr capsule 300 mg  300 mg Oral Daily Doroteo Moseley DO   300 mg at 02/08/25 0907    fluticasone-vilanterol (BREO ELLIPTA) 200-25 MCG/ACT inhaler 1 puff  1 puff Inhalation Daily Doroteo Moseley  "Yunior,    1 puff at 02/08/25 0824    furosemide (LASIX) tablet 40 mg  40 mg Oral BID Doroteo Moseley DO   40 mg at 02/08/25 0907    ipratropium - albuterol 0.5 mg/2.5 mg/3 mL (DUONEB) neb solution 3 mL  3 mL Nebulization 4x daily Doroteo Moseley DO   3 mL at 02/08/25 0820    irbesartan (AVAPRO) tablet 300 mg  300 mg Oral Daily Doroteo Moseley DO   300 mg at 02/08/25 0907    methylPREDNISolone Na Suc (solu-MEDROL) injection 62.5 mg  62.5 mg Intravenous BID IS Basia Barahona MD   62.5 mg at 02/08/25 0906    nintedanib (OFEV) capsule 100 mg  100 mg Oral Every Other Day Doroteo Moseley DO        pantoprazole (PROTONIX) EC tablet 40 mg  40 mg Oral BID Doroteo Moseley DO   40 mg at 02/08/25 0906    potassium chloride everton ER (KLOR-CON M20) CR tablet 20 mEq  20 mEq Oral BID Doroteo Moseley DO   20 mEq at 02/08/25 0907    sodium chloride (PF) 0.9% PF flush 3 mL  3 mL Intracatheter Q8H Doroteo Moseley DO        sodium chloride (PF) 0.9% PF flush 3 mL  3 mL Intracatheter Q8H Doroteo Moseley,    3 mL at 02/08/25 0922     Data     Laboratory:  Recent Labs   Lab 02/08/25  1001 02/07/25  1038   WBC 13.9* 15.6*   HGB 12.5 13.8   HCT 36.6 40.7   MCV 91 92    219     Recent Labs   Lab 02/08/25  1001 02/07/25  1038    137   POTASSIUM 4.3 4.3   CHLORIDE 102 100   CO2 25 26   ANIONGAP 12 11   * 147*   BUN 23.4* 16.1   CR 0.63 0.70   GFRESTIMATED >90 >90   GEOVANNY 9.4 9.2     No results for input(s): \"CULT\" in the last 168 hours.  No results found for: \"TROPI\"    Imaging:  No results found for this or any previous visit (from the past 24 hours).      Doroteo Moseley DO  [facility] Hospitalist  [address] [place] [address]  Securely message with [employer] (more info)  Text page via [employer] Paging/Directory   02/08/2025   "

## 2025-02-08 NOTE — PLAN OF CARE
Goal Outcome Evaluation:       Arrived on unit at 1815. IMC status. Alert and oriented x4. On bipap upon arrival. Very SOB with any movement. NPO. Assist of two pivot transfer. Wheelchair bound with pivot transfer at baseline. PIV SL.

## 2025-02-08 NOTE — PROVIDER NOTIFICATION
"   02/08/25 0033   RCAT Assessment   Reason for Assessment COPD   Pulmonary Status 3   Surgical Status 0   Chest X-ray 2   Respiratory Pattern 1   Mental Status 0   Breath Sounds 2   Cough Effectiveness 0   Level of Activity 1   O2 Required for SpO2>=92% 3   Acuity Level (points) 12   Acuity Level  3   Re-eval Interval Guideline Every 3 days   Re-evaluation Date 02/11/25   Clinical Indications/Symptoms   Aerosol Therapy RCAT protocol   Broncho-pulmonary Hygiene History of mucous producing disease   Volume Expansion Prevent atelectasis   Aerosol Therapy Plan   RT Treatment Nebulizer   Anticholinergic/Beta-Andrenergic Agonist Duoneb soln (0.5mg/3mg per 3mL) neb Max 6 doses/24h   Aerosol Treatment Frequency Acuity Level 3: QID/PRN @noc-Mod wheezing/Hx asthma/secretion removal   Broncho-Pulmonary Hygiene Plan   Broncho-Pulmonary Hygiene Treatment Coughing techniques   Broncho-Pulm Hygiene Frequency Acuity Level 3: TID-Small amounts secretions/poor cough, hx of secretions   Volume Expansion Plan   Volume Expansion Treatment Incentive Spirometer   Volume Expansion Frequency Acuity Level 3: TID-At risk for developing atelectasis     Sats on 15L Oxymizer NC 95%, BS clear/decreased bilat.  /66 (BP Location: Left arm)   Pulse 85   Temp 97.6  F (36.4  C) (Axillary)   Resp 20   Ht 1.676 m (5' 6\")   Wt 114.6 kg (252 lb 10.4 oz)   SpO2 95%   BMI 40.78 kg/m     Will continue to follow.  Ermias Solano, RT on 2/8/2025 at 12:36 AM   "

## 2025-02-09 LAB
ALBUMIN SERPL BCG-MCNC: 3.4 G/DL (ref 3.5–5.2)
ALP SERPL-CCNC: 72 U/L (ref 40–150)
ALT SERPL W P-5'-P-CCNC: 14 U/L (ref 0–50)
AST SERPL W P-5'-P-CCNC: 19 U/L (ref 0–45)
BILIRUB DIRECT SERPL-MCNC: <0.2 MG/DL (ref 0–0.3)
BILIRUB SERPL-MCNC: 0.2 MG/DL
CRP SERPL-MCNC: 36.7 MG/L
GALACTOMANNAN AG SERPL QL IA: NEGATIVE
GALACTOMANNAN AG SPEC IA-ACNC: 0.05
PROT SERPL-MCNC: 6.5 G/DL (ref 6.4–8.3)

## 2025-02-09 PROCEDURE — 94640 AIRWAY INHALATION TREATMENT: CPT | Mod: 76

## 2025-02-09 PROCEDURE — 80076 HEPATIC FUNCTION PANEL: CPT | Performed by: INTERNAL MEDICINE

## 2025-02-09 PROCEDURE — 999N000157 HC STATISTIC RCP TIME EA 10 MIN

## 2025-02-09 PROCEDURE — 94640 AIRWAY INHALATION TREATMENT: CPT

## 2025-02-09 PROCEDURE — 250N000013 HC RX MED GY IP 250 OP 250 PS 637: Performed by: INTERNAL MEDICINE

## 2025-02-09 PROCEDURE — 86140 C-REACTIVE PROTEIN: CPT | Performed by: INTERNAL MEDICINE

## 2025-02-09 PROCEDURE — 120N000001 HC R&B MED SURG/OB

## 2025-02-09 PROCEDURE — 250N000009 HC RX 250: Performed by: INTERNAL MEDICINE

## 2025-02-09 PROCEDURE — 94660 CPAP INITIATION&MGMT: CPT

## 2025-02-09 PROCEDURE — 250N000011 HC RX IP 250 OP 636: Performed by: INTERNAL MEDICINE

## 2025-02-09 PROCEDURE — 99232 SBSQ HOSP IP/OBS MODERATE 35: CPT | Performed by: INTERNAL MEDICINE

## 2025-02-09 PROCEDURE — 36415 COLL VENOUS BLD VENIPUNCTURE: CPT | Performed by: INTERNAL MEDICINE

## 2025-02-09 PROCEDURE — 250N000011 HC RX IP 250 OP 636: Performed by: STUDENT IN AN ORGANIZED HEALTH CARE EDUCATION/TRAINING PROGRAM

## 2025-02-09 RX ADMIN — POTASSIUM CHLORIDE 20 MEQ: 1500 TABLET, EXTENDED RELEASE ORAL at 15:23

## 2025-02-09 RX ADMIN — CEFTRIAXONE 2 G: 2 INJECTION, POWDER, FOR SOLUTION INTRAMUSCULAR; INTRAVENOUS at 10:03

## 2025-02-09 RX ADMIN — POTASSIUM CHLORIDE 20 MEQ: 1500 TABLET, EXTENDED RELEASE ORAL at 10:02

## 2025-02-09 RX ADMIN — IPRATROPIUM BROMIDE AND ALBUTEROL SULFATE 3 ML: .5; 3 SOLUTION RESPIRATORY (INHALATION) at 16:08

## 2025-02-09 RX ADMIN — IPRATROPIUM BROMIDE AND ALBUTEROL SULFATE 3 ML: .5; 3 SOLUTION RESPIRATORY (INHALATION) at 09:35

## 2025-02-09 RX ADMIN — PANTOPRAZOLE SODIUM 40 MG: 40 TABLET, DELAYED RELEASE ORAL at 10:02

## 2025-02-09 RX ADMIN — PANTOPRAZOLE SODIUM 40 MG: 40 TABLET, DELAYED RELEASE ORAL at 20:16

## 2025-02-09 RX ADMIN — DILTIAZEM HYDROCHLORIDE 300 MG: 180 CAPSULE, COATED, EXTENDED RELEASE ORAL at 10:03

## 2025-02-09 RX ADMIN — ENOXAPARIN SODIUM 40 MG: 40 INJECTION SUBCUTANEOUS at 23:19

## 2025-02-09 RX ADMIN — FUROSEMIDE 40 MG: 40 TABLET ORAL at 10:03

## 2025-02-09 RX ADMIN — IRBESARTAN 300 MG: 300 TABLET ORAL at 10:03

## 2025-02-09 RX ADMIN — IPRATROPIUM BROMIDE AND ALBUTEROL SULFATE 3 ML: .5; 3 SOLUTION RESPIRATORY (INHALATION) at 13:10

## 2025-02-09 RX ADMIN — ENOXAPARIN SODIUM 40 MG: 40 INJECTION SUBCUTANEOUS at 15:23

## 2025-02-09 RX ADMIN — FUROSEMIDE 40 MG: 40 TABLET ORAL at 15:23

## 2025-02-09 RX ADMIN — METHYLPREDNISOLONE SODIUM SUCCINATE 62.5 MG: 125 INJECTION, POWDER, FOR SOLUTION INTRAMUSCULAR; INTRAVENOUS at 10:02

## 2025-02-09 RX ADMIN — AZITHROMYCIN DIHYDRATE 250 MG: 250 TABLET ORAL at 10:02

## 2025-02-09 RX ADMIN — FLUTICASONE FUROATE AND VILANTEROL TRIFENATATE 1 PUFF: 200; 25 POWDER RESPIRATORY (INHALATION) at 10:04

## 2025-02-09 RX ADMIN — METHYLPREDNISOLONE SODIUM SUCCINATE 62.5 MG: 125 INJECTION, POWDER, FOR SOLUTION INTRAMUSCULAR; INTRAVENOUS at 18:21

## 2025-02-09 ASSESSMENT — ACTIVITIES OF DAILY LIVING (ADL)
ADLS_ACUITY_SCORE: 66
ADLS_ACUITY_SCORE: 66
ADLS_ACUITY_SCORE: 60
ADLS_ACUITY_SCORE: 60
ADLS_ACUITY_SCORE: 62
ADLS_ACUITY_SCORE: 60
ADLS_ACUITY_SCORE: 64
ADLS_ACUITY_SCORE: 66
ADLS_ACUITY_SCORE: 64
ADLS_ACUITY_SCORE: 66
ADLS_ACUITY_SCORE: 60
ADLS_ACUITY_SCORE: 66
ADLS_ACUITY_SCORE: 60
ADLS_ACUITY_SCORE: 60
ADLS_ACUITY_SCORE: 66
ADLS_ACUITY_SCORE: 60

## 2025-02-09 NOTE — PLAN OF CARE
"Care from 19:00-07:30  Inpatient Progress Note - [facility]  For vital signs and complete assessments, please see documentation flowsheets.     ORIENTATION: AO.  VSS.  PAIN: Denies pain, CP, some SOB, denies n/v.  O2: On Bipap, FiO2 40%.  GI/: Purewick. Requires reminders to urinate.  SKIN: Mepilex to heels.  ACTIVITY: Not OOB.  DIET: Regular  LINES/DRAINS: L PIVs SL  PLAN: Pending.    Goal Outcome Evaluation:      Plan of Care Reviewed With: patient    Overall Patient Progress: no changeOverall Patient Progress: no change    Outcome Evaluation: On BiPap throughout the shift; requires prompting to remember totry to void.    Problem: Adult Inpatient Plan of Care  Goal: Plan of Care Review  Description: The Plan of Care Review/Shift note should be completed every shift.  The Outcome Evaluation is a brief statement about your assessment that the patient is improving, declining, or no change.  This information will be displayed automatically on your shift  note.  Outcome: Progressing  Flowsheets (Taken 2/9/2025 0030)  Outcome Evaluation:   On BiPap throughout the shift   requires prompting to remember totry to void.  Plan of Care Reviewed With: patient  Overall Patient Progress: no change  Goal: Patient-Specific Goal (Individualized)  Description: You can add care plan individualizations to a care plan. Examples of Individualization might be:  \"Parent requests to be called daily at 9am for status\", \"I have a hard time hearing out of my right ear\", or \"Do not touch me to wake me up as it startles  me\".  Outcome: Progressing  Goal: Absence of Hospital-Acquired Illness or Injury  Outcome: Progressing  Intervention: Identify and Manage Fall Risk  Recent Flowsheet Documentation  Taken 2/8/2025 1310 by Danette Barron RN  Safety Promotion/Fall Prevention:   safety round/check completed   supervised activity   room organization consistent   room near nurse's station   room door open   patient and family education   nonskid " shoes/slippers when out of bed   mobility aid in reach   lighting adjusted   increase visualization of patient   increased rounding and observation   clutter free environment maintained   assistive device/personal items within reach   activity supervised  Taken 2/8/2025 2016 by Danette Barron RN  Safety Promotion/Fall Prevention:   safety round/check completed   supervised activity   room organization consistent   room near nurse's station   room door open   patient and family education   nonskid shoes/slippers when out of bed   mobility aid in reach   lighting adjusted   increase visualization of patient   increased rounding and observation   clutter free environment maintained   assistive device/personal items within reach   activity supervised  Intervention: Prevent Skin Injury  Recent Flowsheet Documentation  Taken 2/8/2025 2345 by Danette Barron RN  Body Position: position changed independently  Taken 2/8/2025 2016 by Danette Barron RN  Body Position: position changed independently  Intervention: Prevent and Manage VTE (Venous Thromboembolism) Risk  Recent Flowsheet Documentation  Taken 2/8/2025 2345 by Danette Barron RN  VTE Prevention/Management: SCDs off (sequential compression devices)  Taken 2/8/2025 2016 by Danette Barron RN  VTE Prevention/Management: SCDs off (sequential compression devices)  Intervention: Prevent Infection  Recent Flowsheet Documentation  Taken 2/8/2025 2345 by Danette Barron RN  Infection Prevention:   rest/sleep promoted   single patient room provided  Taken 2/8/2025 2016 by Danette Barron RN  Infection Prevention:   rest/sleep promoted   single patient room provided  Goal: Optimal Comfort and Wellbeing  Outcome: Progressing  Intervention: Provide Person-Centered Care  Recent Flowsheet Documentation  Taken 2/8/2025 2345 by Danette Barron RN  Trust Relationship/Rapport:   care explained   emotional support provided   thoughts/feelings acknowledged   reassurance  provided   questions encouraged   questions answered  Taken 2/8/2025 2016 by Danette Barron, RN  Trust Relationship/Rapport:   care explained   emotional support provided   thoughts/feelings acknowledged   reassurance provided   questions encouraged   questions answered  Goal: Readiness for Transition of Care  Outcome: Progressing

## 2025-02-09 NOTE — PROVIDER NOTIFICATION
02/08/25 0239   Tech Time   $Tech Time (10 minute increments) 3   Mode: CPAP/ BiPAP/ AVAPS/ AVAPS AE   CPAP/BiPAP/ AVAPS/ AVAPS AE Mode BiPAP S/T   Equipment   Device v60   Device Serial Number [id number]   CPAP/BiPAP/Settings   BIPAP/CPAP On Standby On   IPAP/EPAP (cmH2O) 12/7   Rate (breaths/min) 10   Oxygen (%) 40   Timed Inspiration (sec) 1   IPAP RISE  Settings (V60) 3   CPAP/BiPAP Patient Parameters   IPAP (cm H2O) 12 cmH2O   EPAP (cm H2O) 7 cmH2O   Pressure Support (cm H2O) 5 cmH2O   RR Total (breaths/min) 23 breaths/min   Vt (mL) 507 mL   Minute Ventilation (L/min) 10.3 L/min   Peak Inspiratory Pressure (cm H2O) 12 cmH2O   Pt.  Leak (L/min) 0 L/min   CPAP/BiPAP/AVAPS/AVAPS AE Alarms   High Pressure (cm H2O) 25 cmH2O   Low Pressure (cm H2O) 5   Low Pressure Delay (sec) 20 sec   Lo Min Vent 2   High Rate (breaths/min) 50 breaths/min   Low Rate (breaths/min) 8   Audible Alarm set at (Volume of Alarm) 7   Humidifier Checked N/A   RT Device Skin Assessment   Oxygen Delivery Device CPAP/BiPAP Mask   Interface Under-nose Mask - Medium   Ventilator Arm In Place No   Site Appearance neck circumference Clean and dry   Site Appearance bridge of nose Clean and dry   Site Appearance occiput Clean and dry   Strap Tightness Finger Allowance between head and device strap   Device Skin Interventions Taken No adjustments needed   Respiratory WDL   Respiratory WDL X   Rhythm/Pattern, Respiratory assisted mechanically   Expansion/Accessory Muscles/Retractions expansion symmetric;no retractions;no use of accessory muscles   Nebulizer Assessment & Treatment   Nebulizer Device In line   $RT Use ONLY Delivery Method Nebulizer - Additional   Pretreatment Heart Rate (beats/min) 92   Pretreatment Resp Rate (breaths/min) 23   Pretreat Breath Sounds - Bilat - All Lobes diminished   Patient Position HOB elevated   Respiratory Treatment Status (SVN) given     Ermias Solano, RT on 2/8/2025 at 9:57 PM

## 2025-02-09 NOTE — PLAN OF CARE
"Alert and oriented x4. VSS ex: increased respirations. Wheelchair bound with pivot transfer at baseline; not OOB this shift. 2 PIV's SL. Regular diet. Purewick in place; some leaking from not being in place correctly. 2 incontinent episodes. Tolerated oxymizer NC for all of shift today. Denies pain or CP. Intermittent SOB and STREET with any movement. Frequently refuses repositioning due to not tolerating HOB lowered. Pre existing pressure sore on left upper butt cheek; mepiplex applied. Bilateral heels reddened but blanchable; mepiplex applied. Pulmonology to see sushant.         Goal Outcome Evaluation:      Plan of Care Reviewed With: patient    Overall Patient Progress: improvingOverall Patient Progress: improving    Outcome Evaluation: Tolerating Oxymizer NC well; was able to be weaned down to 10L.      Problem: Adult Inpatient Plan of Care  Goal: Plan of Care Review  Description: The Plan of Care Review/Shift note should be completed every shift.  The Outcome Evaluation is a brief statement about your assessment that the patient is improving, declining, or no change.  This information will be displayed automatically on your shift  note.  Outcome: Progressing  Flowsheets (Taken 2/9/2025 1753)  Outcome Evaluation:   Tolerating Oxymizer NC well   was able to be weaned down to 10L.  Plan of Care Reviewed With: patient  Overall Patient Progress: improving  Goal: Patient-Specific Goal (Individualized)  Description: You can add care plan individualizations to a care plan. Examples of Individualization might be:  \"Parent requests to be called daily at 9am for status\", \"I have a hard time hearing out of my right ear\", or \"Do not touch me to wake me up as it startles  me\".  Outcome: Progressing  Goal: Absence of Hospital-Acquired Illness or Injury  Outcome: Progressing  Intervention: Identify and Manage Fall Risk  Flowsheets  Taken 2/9/2025 1756  Safety Promotion/Fall Prevention:   room organization consistent   clutter free " environment maintained   increased rounding and observation   increase visualization of patient  Taken 2/9/2025 0954  Safety Promotion/Fall Prevention:   safety round/check completed   activity supervised  Intervention: Prevent Skin Injury  Flowsheets  Taken 2/9/2025 1753  Body Position: position changed independently  Skin Protection:   adhesive use limited   tubing/devices free from skin contact  Taken 2/9/2025 0954  Body Position: position changed independently  Skin Protection:   adhesive use limited   tubing/devices free from skin contact  Intervention: Prevent and Manage VTE (Venous Thromboembolism) Risk  Flowsheets  Taken 2/9/2025 1753  VTE Prevention/Management: SCDs off (sequential compression devices)  Taken 2/9/2025 0954  VTE Prevention/Management: (Lovenox) SCDs off (sequential compression devices)  Intervention: Prevent Infection  Flowsheets  Taken 2/9/2025 1753  Infection Prevention:   single patient room provided   rest/sleep promoted   hand hygiene promoted  Taken 2/9/2025 0954  Infection Prevention:   single patient room provided   rest/sleep promoted   hand hygiene promoted  Goal: Optimal Comfort and Wellbeing  Outcome: Progressing  Intervention: Monitor Pain and Promote Comfort  Flowsheets (Taken 2/9/2025 1753)  Pain Management Interventions:   care clustered   emotional support  Intervention: Provide Person-Centered Care  Flowsheets  Taken 2/9/2025 1753  Trust Relationship/Rapport:   care explained   emotional support provided   thoughts/feelings acknowledged   reassurance provided   questions encouraged   questions answered  Taken 2/9/2025 0954  Trust Relationship/Rapport:   care explained   emotional support provided   thoughts/feelings acknowledged   reassurance provided   questions encouraged   questions answered  Goal: Readiness for Transition of Care  Outcome: Progressing     Problem: Comorbidity Management  Goal: Maintenance of COPD Symptom Control  Outcome: Progressing  Intervention:  Maintain COPD Symptom Control  Flowsheets  Taken 2/9/2025 1753  Breathing Techniques/Airway Clearance: pursed-lip breathing encouraged  Medication Review/Management: medications reviewed  Taken 2/9/2025 0954  Breathing Techniques/Airway Clearance: pursed-lip breathing encouraged  Medication Review/Management: medications reviewed  Goal: Blood Pressure in Desired Range  Outcome: Progressing  Intervention: Maintain Blood Pressure Management  Flowsheets  Taken 2/9/2025 1753  Medication Review/Management: medications reviewed  Taken 2/9/2025 0954  Medication Review/Management: medications reviewed     Problem: Skin Injury Risk Increased  Goal: Skin Health and Integrity  Outcome: Progressing  Intervention: Plan: Nurse Driven Intervention: Positioning  Flowsheets  Taken 2/9/2025 1753  Plan: Positioning Interventions: OFF-LOAD HEELS with pillows  Taken 2/9/2025 0954  Plan: Positioning Interventions: OFF-LOAD HEELS with pillows  Intervention: Plan: Nurse Driven Intervention: Moisture Management  Flowsheets  Taken 2/9/2025 1753  Moisture Interventions:   Incontinence pad   Urinary collection device  Bathing/Skin Care:   incontinence care   back care  Incontinence Protocol in Use: Active  Skin Appearance: (pre existing pressure sore on left upper butt check) Other (see comments)  Plan: Moisture Management:   incontinence pad   urinary collection device  Taken 2/9/2025 0954  Moisture Interventions:   Incontinence pad   Urinary collection device  Bathing/Skin Care: incontinence care  Incontinence Protocol in Use: Active  Plan: Moisture Management:   incontinence pad   urinary collection device  Taken 2/9/2025 0800  Moisture Interventions:   Incontinence pad   Urinary collection device  Bathing/Skin Care: incontinence care  Intervention: Plan: Nurse Driven Intervention: Friction and Shear  Flowsheets  Taken 2/9/2025 1753  Friction/Shear Interventions:   HOB 30 degrees or less   Silicone foam sacral dressing   Preventative  dressing heels  Taken 2/9/2025 0954  Friction/Shear Interventions:   HOB 30 degrees or less   Silicone foam sacral dressing  Intervention: Optimize Skin Protection  Flowsheets  Taken 2/9/2025 1753  Pressure Reduction Techniques:   frequent weight shift encouraged   heels elevated off bed   positioned off wounds   weight shift assistance provided  Pressure Reduction Devices: heel offloading device utilized  Skin Protection:   adhesive use limited   tubing/devices free from skin contact  Activity Management: activity adjusted per tolerance  Head of Bed (HOB) Positioning: HOB at 30-45 degrees  Taken 2/9/2025 0954  Pressure Reduction Techniques:   frequent weight shift encouraged   heels elevated off bed   positioned off wounds   weight shift assistance provided  Skin Protection:   adhesive use limited   tubing/devices free from skin contact  Activity Management: activity adjusted per tolerance  Head of Bed (HOB) Positioning: HOB at 30-45 degrees  Intervention: Promote and Optimize Oral Intake  Flowsheets  Taken 2/9/2025 1753  Oral Nutrition Promotion: rest periods promoted  Taken 2/9/2025 0954  Oral Nutrition Promotion: rest periods promoted

## 2025-02-09 NOTE — PROGRESS NOTES
[facility]    Hospitalist Progress Note  Name: Mary E Weiland    MRN: [id number]  Provider:  Doroteo Moseley DO  Date of Service: 02/09/2025    Summary of Stay: Mary E Weiland is a 69 year old female with past medical history of end-stage idiopathic pulmonary fibrosis with severe pulmonary hypertension, chronic home oxygen dependence of 10 L high flow continuously at baseline, COPD, type 2 diabetes mellitus, hypertension who presented on 2/7/2025 with chief complaint of shortness of breath.  In the emergency department, the patient was found to have a temperature of 98.8  F, heart rate 117, blood pressure 108/79, respiratory rate 37, SpO2 94% on continuous BiPAP. Initial lab work showed proBNP 3935, high-sensitivity troponin 30, WBC 15.6. COVID-19, influenza, RSV were negative. Chest x-ray shows left lower lung consolidative opacity with air bronchograms with hazy opacities in the upper lungs with some reticulation with findings suspicious for multifocal pneumonitis. The patient was started on ceftriaxone and azithromycin with concern for underlying community-acquired pneumonia. The patient was also started on IV Solu-Medrol in the setting of end-stage idiopathic pulmonary fibrosis. Pulmonology was consulted to see the patient.     TODAY'S PLAN: Appreciate pulmonology recommendations.  Continue IV Solu-Medrol, continue ceftriaxone and azithromycin, DuoNebs.  Patient overall doing better today and is on her home oxygen requirements, but continues to require intermittent BiPAP.  Family is at bedside.  We discussed tentative goal of Tuesday discharge home.  She was encouraged to contact hospice and we discussed recurrent hospitalization and make it difficult for her to spend her last days at home as she may not be able to discharge safely from the hospital.  Patient expressed understanding.  All questions answered.    Problem List:   Acute on Chronic Hypoxic Respiratory Failure  Possible Community  Acquired Pneumonia  End-Stage Pulmonary Fibrosis  Severe Pulmonary Artery Hypertension  NSTEMI - Likely Type 2  - At baseline pt uses 10L HF continuously  - Follows with Dr. Darling of Pulmonology  - IV Solumedrol 40 mg q8h, but consider high dose solumedrol pending pulmonary evaluation  - Appreciate Pulmonology recommendations regarding steroid dosing  - Duonebs - pt reports some benefit in the past with these, so will schedule  - Ceftriaxone and Azithromycin     Goals of Care  - Pt is confirmed DNR/DNI with pt and family at bedside.  Pt reports she previously had plans to sign of to hospice, but improved after a hospitalization so put it off.  She expressed interest in getting stabilized then signing on to hospice.  Pt reports hospice is arranged at home, she just needs to contact them to set it up.  She plans to do this when she gets home from the hospital.     Chronic Medical Problems:  Hypertension  Type 2 Diabetes Mellitus  COPD  Morbid Obesity - BMI 40.78    I spent 44 minutes in reviewing this patient's labs, imaging, medications, medical history.  In addition time was spent interviewing the patient, communicating with family, and medical decision making.      DVT Prophylaxis: Enoxaparin (Lovenox) SQ  Code Status: No CPR- Do NOT Intubate  Diet: Regular Diet Adult    Hernandez Catheter: Not present    Disposition: Medically Ready for Discharge: Anticipated in 2-4 Days    Goals to discharge include: oxygen requirements improved and stable on home oxygen  Family updated today: Yes      Interval History   Pt seen and examined.  Pt reports feeling better today.    -Data reviewed today: I personally reviewed all new labs and imaging results over the last 24 hours.     Physical Exam   Temp: 97.8  F (36.6  C) Temp src: Axillary BP: 124/80 Pulse: 82   Resp: 20 SpO2: 97 % O2 Device: Oxymizer cannula Oxygen Delivery: (S) 10 LPM  Vitals:    02/07/25 1031   Weight: 114.6 kg (252 lb 10.4 oz)     Vital Signs with Ranges  Temp:   [97.1  F (36.2  C)-98  F (36.7  C)] 97.8  F (36.6  C)  Pulse:  [] 82  Resp:  [18-23] 20  BP: (118-128)/(78-84) 124/80  FiO2 (%):  [40 %] 40 %  SpO2:  [87 %-97 %] 97 %  I/O last 3 completed shifts:  In: 240 [P.O.:240]  Out: 1400 [Urine:1400]    GENERAL: No apparent distress. Awake, alert, and fully oriented.  HEENT: Normocephalic, atraumatic. Extraocular movements intact.  CARDIOVASCULAR: Regular rate and rhythm without murmurs or rubs. No S3.  PULMONARY: Clear bilaterally.  GASTROINTESTINAL: Soft, non-tender, non-distended. Bowel sounds normoactive.   EXTREMITIES: No cyanosis or clubbing. No edema.  NEUROLOGICAL: CN 2-12 grossly intact, no focal neurological deficits.  DERMATOLOGICAL: No rash, ulcer, bruising, nor jaundice.    Medications   Current Facility-Administered Medications   Medication Dose Route Frequency Provider Last Rate Last Admin    No lozenges or gum should be given while patient on BIPAP/AVAPS/AVAPS AE   Does not apply Continuous PRN Doroteo Moseley DO        Patient may continue current oral medications   Does not apply Continuous PRN Doroteo Moseley DO         Current Facility-Administered Medications   Medication Dose Route Frequency Provider Last Rate Last Admin    azithromycin (ZITHROMAX) tablet 250 mg  250 mg Oral Daily Doroteo Moseley DO   250 mg at 02/09/25 1002    cefTRIAXone (ROCEPHIN) 2 g vial to attach to  ml bag for ADULTS or NS 50 ml bag for PEDS  2 g Intravenous Q24H [facility] Doroteo Moseley DO   2 g at 02/09/25 1003    diltiazem ER COATED BEADS (CARDIZEM CD/CARTIA XT) 24 hr capsule 300 mg  300 mg Oral Daily Doroteo Moseley DO   300 mg at 02/09/25 1003    enoxaparin ANTICOAGULANT (LOVENOX) injection 40 mg  40 mg Subcutaneous Q12H Doroteo Moseley DO   40 mg at 02/08/25 2333    fluticasone-vilanterol (BREO ELLIPTA) 200-25 MCG/ACT inhaler 1 puff  1 puff Inhalation Daily Doroteo Moseley DO   1 puff at 02/09/25 1004    furosemide (LASIX)  "tablet 40 mg  40 mg Oral BID Doroteo Moseley DO   40 mg at 02/09/25 1003    ipratropium - albuterol 0.5 mg/2.5 mg/3 mL (DUONEB) neb solution 3 mL  3 mL Nebulization 4x daily Doroteo Moseley DO   3 mL at 02/09/25 0935    irbesartan (AVAPRO) tablet 300 mg  300 mg Oral Daily Doroteo Moseley DO   300 mg at 02/09/25 1003    methylPREDNISolone Na Suc (solu-MEDROL) injection 62.5 mg  62.5 mg Intravenous BID IS Basia Barahona MD   62.5 mg at 02/09/25 1002    nintedanib (OFEV) capsule 100 mg  100 mg Oral Every Other Day Doroteo Moseley DO   100 mg at 02/08/25 1250    pantoprazole (PROTONIX) EC tablet 40 mg  40 mg Oral BID Doroteo Moseley DO   40 mg at 02/09/25 1002    potassium chloride everton ER (KLOR-CON M20) CR tablet 20 mEq  20 mEq Oral BID Doroteo Moseley DO   20 mEq at 02/09/25 1002    sodium chloride (PF) 0.9% PF flush 3 mL  3 mL Intracatheter Q8H Doroteo Moseley DO   3 mL at 02/09/25 1004     Data     Laboratory:  Recent Labs   Lab 02/08/25  1001 02/07/25  1038   WBC 13.9* 15.6*   HGB 12.5 13.8   HCT 36.6 40.7   MCV 91 92    219     Recent Labs   Lab 02/08/25  1001 02/07/25  1038    137   POTASSIUM 4.3 4.3   CHLORIDE 102 100   CO2 25 26   ANIONGAP 12 11   * 147*   BUN 23.4* 16.1   CR 0.63 0.70   GFRESTIMATED >90 >90   GEOVANNY 9.4 9.2     No results for input(s): \"CULT\" in the last 168 hours.  No results found for: \"TROPI\"    Imaging:  No results found for this or any previous visit (from the past 24 hours).      Doroteo Moseley DO  Formerly [facility] Hospitalist  [address] [place] [address]  Securely message with Kera (more info)  Text page via [facility] Paging/Directory   02/09/2025   "

## 2025-02-10 LAB — CRP SERPL-MCNC: 19.85 MG/L

## 2025-02-10 PROCEDURE — 94640 AIRWAY INHALATION TREATMENT: CPT

## 2025-02-10 PROCEDURE — 86140 C-REACTIVE PROTEIN: CPT | Performed by: INTERNAL MEDICINE

## 2025-02-10 PROCEDURE — 250N000011 HC RX IP 250 OP 636: Performed by: STUDENT IN AN ORGANIZED HEALTH CARE EDUCATION/TRAINING PROGRAM

## 2025-02-10 PROCEDURE — 36415 COLL VENOUS BLD VENIPUNCTURE: CPT | Performed by: INTERNAL MEDICINE

## 2025-02-10 PROCEDURE — 120N000001 HC R&B MED SURG/OB

## 2025-02-10 PROCEDURE — 250N000009 HC RX 250: Performed by: INTERNAL MEDICINE

## 2025-02-10 PROCEDURE — 999N000157 HC STATISTIC RCP TIME EA 10 MIN

## 2025-02-10 PROCEDURE — 250N000013 HC RX MED GY IP 250 OP 250 PS 637: Performed by: INTERNAL MEDICINE

## 2025-02-10 PROCEDURE — 94640 AIRWAY INHALATION TREATMENT: CPT | Mod: 76

## 2025-02-10 PROCEDURE — 250N000011 HC RX IP 250 OP 636: Performed by: INTERNAL MEDICINE

## 2025-02-10 PROCEDURE — 99232 SBSQ HOSP IP/OBS MODERATE 35: CPT | Performed by: INTERNAL MEDICINE

## 2025-02-10 PROCEDURE — 94660 CPAP INITIATION&MGMT: CPT

## 2025-02-10 RX ADMIN — IPRATROPIUM BROMIDE AND ALBUTEROL SULFATE 3 ML: .5; 3 SOLUTION RESPIRATORY (INHALATION) at 08:05

## 2025-02-10 RX ADMIN — POTASSIUM CHLORIDE 20 MEQ: 1500 TABLET, EXTENDED RELEASE ORAL at 08:40

## 2025-02-10 RX ADMIN — FUROSEMIDE 40 MG: 40 TABLET ORAL at 08:39

## 2025-02-10 RX ADMIN — IPRATROPIUM BROMIDE AND ALBUTEROL SULFATE 3 ML: .5; 3 SOLUTION RESPIRATORY (INHALATION) at 17:38

## 2025-02-10 RX ADMIN — POTASSIUM CHLORIDE 20 MEQ: 1500 TABLET, EXTENDED RELEASE ORAL at 12:38

## 2025-02-10 RX ADMIN — METHYLPREDNISOLONE SODIUM SUCCINATE 62.5 MG: 125 INJECTION, POWDER, FOR SOLUTION INTRAMUSCULAR; INTRAVENOUS at 08:40

## 2025-02-10 RX ADMIN — FLUTICASONE FUROATE AND VILANTEROL TRIFENATATE 1 PUFF: 200; 25 POWDER RESPIRATORY (INHALATION) at 08:05

## 2025-02-10 RX ADMIN — FUROSEMIDE 40 MG: 40 TABLET ORAL at 12:38

## 2025-02-10 RX ADMIN — IPRATROPIUM BROMIDE AND ALBUTEROL SULFATE 3 ML: .5; 3 SOLUTION RESPIRATORY (INHALATION) at 22:15

## 2025-02-10 RX ADMIN — AZITHROMYCIN DIHYDRATE 250 MG: 250 TABLET ORAL at 08:39

## 2025-02-10 RX ADMIN — PANTOPRAZOLE SODIUM 40 MG: 40 TABLET, DELAYED RELEASE ORAL at 08:40

## 2025-02-10 RX ADMIN — METHYLPREDNISOLONE SODIUM SUCCINATE 62.5 MG: 125 INJECTION, POWDER, FOR SOLUTION INTRAMUSCULAR; INTRAVENOUS at 17:20

## 2025-02-10 RX ADMIN — PANTOPRAZOLE SODIUM 40 MG: 40 TABLET, DELAYED RELEASE ORAL at 20:26

## 2025-02-10 RX ADMIN — IRBESARTAN 300 MG: 300 TABLET ORAL at 08:40

## 2025-02-10 RX ADMIN — CEFTRIAXONE 2 G: 2 INJECTION, POWDER, FOR SOLUTION INTRAMUSCULAR; INTRAVENOUS at 08:42

## 2025-02-10 RX ADMIN — ENOXAPARIN SODIUM 40 MG: 40 INJECTION SUBCUTANEOUS at 12:40

## 2025-02-10 RX ADMIN — IPRATROPIUM BROMIDE AND ALBUTEROL SULFATE 3 ML: .5; 3 SOLUTION RESPIRATORY (INHALATION) at 13:04

## 2025-02-10 RX ADMIN — DILTIAZEM HYDROCHLORIDE 300 MG: 180 CAPSULE, COATED, EXTENDED RELEASE ORAL at 08:40

## 2025-02-10 ASSESSMENT — ACTIVITIES OF DAILY LIVING (ADL)
ADLS_ACUITY_SCORE: 66
ADLS_ACUITY_SCORE: 64
ADLS_ACUITY_SCORE: 66
ADLS_ACUITY_SCORE: 64
ADLS_ACUITY_SCORE: 66
ADLS_ACUITY_SCORE: 64
ADLS_ACUITY_SCORE: 66
ADLS_ACUITY_SCORE: 64
ADLS_ACUITY_SCORE: 64
ADLS_ACUITY_SCORE: 66
ADLS_ACUITY_SCORE: 64
ADLS_ACUITY_SCORE: 64
ADLS_ACUITY_SCORE: 66
DEPENDENT_IADLS:: CLEANING;LAUNDRY;SHOPPING;MEAL PREPARATION;TRANSPORTATION
ADLS_ACUITY_SCORE: 64
ADLS_ACUITY_SCORE: 64
ADLS_ACUITY_SCORE: 66
ADLS_ACUITY_SCORE: 66
ADLS_ACUITY_SCORE: 64
ADLS_ACUITY_SCORE: 64
ADLS_ACUITY_SCORE: 66

## 2025-02-10 NOTE — PLAN OF CARE
Care from 19:00-07:30  Inpatient Progress Note - MS3  For vital signs and complete assessments, please see documentation flowsheets.     ORIENTATION: AO.  VSS.  PAIN: Denies pain, CP, some SOB, denies n/v.  O2: On Bipap, FiO2 40%.; Switched back to oxymizer cannula this morning after cares and repositioning: pt recovers well starting at 15L then working down to 12L and 10L (baseline).  GI/: Purewick. Requires reminders to urinate. PVR: 45; good UO through this shift.  ACTIVITY: Not OOB. Frequently refuses positioning due to difficulty having HOB lowered r/t SOB/STREET with movements, pt instead prefers to weight shift and make smaller adjustments in position. Pt was able to self-boost this morning using side rails and pushing with her heels.  SKIN: Pre-existing pressure sore to R butt cheek; Mepilex replaced.  DIET: Regular  LINES/DRAINS: L PIVs SL  PLAN: Pending. Pt interested in potentially exploring the idea of coordinating WC transport home working with SW if possible eventually. Pt eager to return home.    Goal Outcome Evaluation:      Plan of Care Reviewed With: patient    Overall Patient Progress: improvingOverall Patient Progress: improving    Outcome Evaluation: Tolerated Oxymizer in the evening, BiPap applied at HS, satting well. Pt reports she's feeling better. Still requires reminders to void with Purewick in place, especially overnight.    Problem: Adult Inpatient Plan of Care  Goal: Plan of Care Review  Description: The Plan of Care Review/Shift note should be completed every shift.  The Outcome Evaluation is a brief statement about your assessment that the patient is improving, declining, or no change.  This information will be displayed automatically on your shift  note.  Outcome: Progressing  Flowsheets (Taken 2/10/2025 0206)  Outcome Evaluation: Tolerated Oxymizer in the evening, BiPap applied at HS, satting well. Pt reports she's feeling better. Still requires reminders to void with Purewick in  "place, especially overnight.  Plan of Care Reviewed With: patient  Overall Patient Progress: improving  Goal: Patient-Specific Goal (Individualized)  Description: You can add care plan individualizations to a care plan. Examples of Individualization might be:  \"Parent requests to be called daily at 9am for status\", \"I have a hard time hearing out of my right ear\", or \"Do not touch me to wake me up as it startles  me\".  Outcome: Progressing  Goal: Absence of Hospital-Acquired Illness or Injury  Outcome: Progressing  Intervention: Identify and Manage Fall Risk  Recent Flowsheet Documentation  Taken 2/9/2025 2322 by Danette Barron RN  Safety Promotion/Fall Prevention:   safety round/check completed   supervised activity   room organization consistent   room door open   room near nurse's station   patient and family education   nonskid shoes/slippers when out of bed   mobility aid in reach   lighting adjusted   increase visualization of patient   increased rounding and observation   clutter free environment maintained   assistive device/personal items within reach   activity supervised  Taken 2/9/2025 2024 by Danette Barron RN  Safety Promotion/Fall Prevention:   safety round/check completed   supervised activity   room organization consistent   room door open   room near nurse's station   patient and family education   nonskid shoes/slippers when out of bed   mobility aid in reach   lighting adjusted   increase visualization of patient   increased rounding and observation   clutter free environment maintained   assistive device/personal items within reach   activity supervised  Intervention: Prevent Skin Injury  Recent Flowsheet Documentation  Taken 2/9/2025 2322 by Danette Barron RN  Body Position: weight shifting  Skin Protection:   adhesive use limited   tubing/devices free from skin contact  Taken 2/9/2025 2024 by Danette Barron RN  Body Position:   weight shifting   neutral head position   position " changed independently  Skin Protection:   adhesive use limited   tubing/devices free from skin contact  Intervention: Prevent and Manage VTE (Venous Thromboembolism) Risk  Recent Flowsheet Documentation  Taken 2/9/2025 2322 by Danette Barron RN  VTE Prevention/Management: SCDs off (sequential compression devices)  Taken 2/9/2025 2024 by Danette Barron RN  VTE Prevention/Management: SCDs off (sequential compression devices)  Intervention: Prevent Infection  Recent Flowsheet Documentation  Taken 2/9/2025 2322 by Danette Barron RN  Infection Prevention:   single patient room provided   rest/sleep promoted  Taken 2/9/2025 2024 by Danette Barron RN  Infection Prevention:   single patient room provided   rest/sleep promoted  Goal: Optimal Comfort and Wellbeing  Outcome: Progressing  Intervention: Provide Person-Centered Care  Recent Flowsheet Documentation  Taken 2/9/2025 2322 by Danette Barron RN  Trust Relationship/Rapport:   care explained   emotional support provided   thoughts/feelings acknowledged   reassurance provided   questions encouraged   questions answered  Taken 2/9/2025 2024 by Danette Barron RN  Trust Relationship/Rapport:   care explained   emotional support provided   thoughts/feelings acknowledged   reassurance provided   questions encouraged   questions answered  Goal: Readiness for Transition of Care  Outcome: Progressing

## 2025-02-10 NOTE — PROVIDER NOTIFICATION
02/10/25 0356   Tech Time   $Tech Time (10 minute increments) 3   Mode: CPAP/ BiPAP/ AVAPS/ AVAPS AE   CPAP/BiPAP/ AVAPS/ AVAPS AE Mode BiPAP S/T   Equipment   Device v60   Device Serial Number [id number]   CPAP/BiPAP/Settings   $CPAP/BiPAP Subsequent completed   BIPAP/CPAP On Standby On   IPAP/EPAP (cmH2O) 12/7   Rate (breaths/min) 10   Oxygen (%) 40   Timed Inspiration (sec) 1   IPAP RISE  Settings (V60) 3   CPAP/BiPAP Patient Parameters   IPAP (cm H2O) 12 cmH2O   EPAP (cm H2O) 7 cmH2O   Pressure Support (cm H2O) 5 cmH2O   RR Total (breaths/min) 22 breaths/min   Vt (mL) 489 mL   Minute Ventilation (L/min) 8.4 L/min   Peak Inspiratory Pressure (cm H2O) 12 cmH2O   Pt.  Leak (L/min) 10 L/min   CPAP/BiPAP/AVAPS/AVAPS AE Alarms   High Pressure (cm H2O) 25 cmH2O   Low Pressure (cm H2O) 5   Low Pressure Delay (sec) 20 sec   Lo Min Vent 2   High Rate (breaths/min) 50 breaths/min   Low Rate (breaths/min) 8   Audible Alarm set at (Volume of Alarm) 7   Humidifier Checked N/A   RT Device Skin Assessment   Oxygen Delivery Device CPAP/BiPAP Mask   Interface Under-nose Mask - Medium   Ventilator Arm In Place No   Site Appearance neck circumference Clean and dry   Site Appearance bridge of nose Clean and dry   Site Appearance occiput Clean and dry   Strap Tightness Finger Allowance between head and device strap   Device Skin Interventions Taken No adjustments needed   Respiratory WDL   Respiratory WDL X   Rhythm/Pattern, Respiratory assisted mechanically   Expansion/Accessory Muscles/Retractions expansion symmetric;no retractions;no use of accessory muscles     Ermias Solano, RT on 2/10/2025 at 3:59 AM

## 2025-02-10 NOTE — PROGRESS NOTES
[facility]    Hospitalist Progress Note  Name: Mary E Weiland    MRN: [id number]  Provider:  Doroteo Moseley DO  Date of Service: 02/10/2025    Summary of Stay: Mary E Weiland is a 69 year old female with past medical history of end-stage idiopathic pulmonary fibrosis with severe pulmonary hypertension, chronic home oxygen dependence of 10 L high flow continuously at baseline, COPD, type 2 diabetes mellitus, hypertension who presented on 2/7/2025 with chief complaint of shortness of breath.  In the emergency department, the patient was found to have a temperature of 98.8  F, heart rate 117, blood pressure 108/79, respiratory rate 37, SpO2 94% on continuous BiPAP. Initial lab work showed proBNP 3935, high-sensitivity troponin 30, WBC 15.6. COVID-19, influenza, RSV were negative. Chest x-ray shows left lower lung consolidative opacity with air bronchograms with hazy opacities in the upper lungs with some reticulation with findings suspicious for multifocal pneumonitis. The patient was started on ceftriaxone and azithromycin with concern for underlying community-acquired pneumonia. The patient was also started on IV Solu-Medrol in the setting of end-stage idiopathic pulmonary fibrosis. Pulmonology was consulted to see the patient.     TODAY'S PLAN: Appreciate pulmonology recommendations.  Patient has been off her home oxygen requirements but oxygen saturations do drop to the 80s with even just conversations.  Normally she bumped her oxygen up to 15 L at home when she has to do any kind of exertion, even minimally like adjusting herself in the bed.  Sometimes it takes her 10 to 15 minutes to recuperate her oxygen levels.  Continue IV Solu-Medrol and ceftriaxone/azithromycin.  Continue DuoNebs.  Appreciate pulmonology guidance regarding steroids.  Will try for transfer to commode today to see if discharged home with transfer to wheelchair as possible for tomorrow.  Anticipate another 1 to 2 days in  the hospital, though this depends on the patient's ability to transfer.   at bedside.  All questions answered.  Pt with productive cough today.  Will add sputum culture, if able.    Problem List:   Acute on Chronic Hypoxic Respiratory Failure  Possible Community Acquired Pneumonia  End-Stage Pulmonary Fibrosis  Severe Pulmonary Artery Hypertension  NSTEMI - Likely Type 2  - At baseline pt uses 10L HF continuously.  Pt increases it to 15L with minimal activity.    - Follows with Dr. Darling of Pulmonology  - IV Solumedrol 40 mg q8h, but consider high dose solumedrol pending pulmonary evaluation  - Appreciate Pulmonology recommendations regarding steroid dosing  - Duonebs - pt reports some benefit in the past with these, so will schedule  - Ceftriaxone and Azithromycin     Goals of Care  - Pt is confirmed DNR/DNI with pt and family at bedside.  Pt reports she previously had plans to sign of to hospice, but improved after a hospitalization so put it off.  She expressed interest in getting stabilized then signing on to hospice.  Pt reports hospice is arranged at home, she just needs to contact them to set it up.  She plans to do this when she gets home from the hospital.     Chronic Medical Problems:  Hypertension  Type 2 Diabetes Mellitus  COPD  Morbid Obesity - BMI 40.78    I spent 46 minutes in reviewing this patient's labs, imaging, medications, medical history.  In addition time was spent interviewing the patient, communicating with family, and medical decision making.      DVT Prophylaxis: Enoxaparin (Lovenox) SQ  Code Status: No CPR- Do NOT Intubate  Diet: Regular Diet Adult    Hernandez Catheter: Not present    Disposition: Medically Ready for Discharge: Anticipated Tomorrow (1-2 days)    Goals to discharge include: oxygen requirements stable, able to transfer to wheelchair  Family updated today: Yes      Interval History   Pt seen and examined.  Pt reports productive cough with yellow sputum today.    -Data  reviewed today: I personally reviewed all new labs and imaging results over the last 24 hours.     Physical Exam   Temp: 98.3  F (36.8  C) Temp src: Oral BP: 134/87 Pulse: 97   Resp: 22 SpO2: 94 % O2 Device: Oxymizer cannula Oxygen Delivery: 10 LPM  Vitals:    02/07/25 1031   Weight: 114.6 kg (252 lb 10.4 oz)     Vital Signs with Ranges  Temp:  [97.7  F (36.5  C)-98.3  F (36.8  C)] 98.3  F (36.8  C)  Pulse:  [86-97] 97  Resp:  [20-26] 22  BP: (134-145)/(87-95) 134/87  FiO2 (%):  [40 %] 40 %  SpO2:  [90 %-96 %] 94 %  I/O last 3 completed shifts:  In: 1140 [P.O.:1140]  Out: 1950 [Urine:1950]    GENERAL: No apparent distress. Awake, alert, and fully oriented.  HEENT: Normocephalic, atraumatic. Extraocular movements intact.  CARDIOVASCULAR: Regular rate and rhythm without murmurs or rubs. No S3.  PULMONARY: Clear bilaterally.  GASTROINTESTINAL: Soft, non-tender, non-distended. Bowel sounds normoactive.   EXTREMITIES: No cyanosis or clubbing. No edema.  NEUROLOGICAL: CN 2-12 grossly intact, no focal neurological deficits.  DERMATOLOGICAL: No rash, ulcer, bruising, nor jaundice.    Medications   Current Facility-Administered Medications   Medication Dose Route Frequency Provider Last Rate Last Admin    No lozenges or gum should be given while patient on BIPAP/AVAPS/AVAPS AE   Does not apply Continuous PRN Doroteo Moseley DO        Patient may continue current oral medications   Does not apply Continuous PRN Doroteo Moseley DO         Current Facility-Administered Medications   Medication Dose Route Frequency Provider Last Rate Last Admin    azithromycin (ZITHROMAX) tablet 250 mg  250 mg Oral Daily Doroteo Moseley DO   250 mg at 02/10/25 0839    cefTRIAXone (ROCEPHIN) 2 g vial to attach to  ml bag for ADULTS or NS 50 ml bag for PEDS  2 g Intravenous Q24H CARY Doroteo Moseley DO   2 g at 02/10/25 0842    diltiazem ER COATED BEADS (CARDIZEM CD/CARTIA XT) 24 hr capsule 300 mg  300 mg Oral Daily  "Doroteo Moseley DO   300 mg at 02/10/25 0840    enoxaparin ANTICOAGULANT (LOVENOX) injection 40 mg  40 mg Subcutaneous Q12H Doroteo Moseley, DO   40 mg at 02/09/25 2319    fluticasone-vilanterol (BREO ELLIPTA) 200-25 MCG/ACT inhaler 1 puff  1 puff Inhalation Daily Doroteo Moseley DO   1 puff at 02/10/25 0805    furosemide (LASIX) tablet 40 mg  40 mg Oral BID Doroteo Moseley DO   40 mg at 02/10/25 0839    ipratropium - albuterol 0.5 mg/2.5 mg/3 mL (DUONEB) neb solution 3 mL  3 mL Nebulization 4x daily Doroteo Moseley DO   3 mL at 02/10/25 0805    irbesartan (AVAPRO) tablet 300 mg  300 mg Oral Daily Doroteo Moseley DO   300 mg at 02/10/25 0840    methylPREDNISolone Na Suc (solu-MEDROL) injection 62.5 mg  62.5 mg Intravenous BID IS Basia Barahona MD   62.5 mg at 02/10/25 0840    nintedanib (OFEV) capsule 100 mg  100 mg Oral Every Other Day Doroteo Moseley DO   100 mg at 02/10/25 0844    pantoprazole (PROTONIX) EC tablet 40 mg  40 mg Oral BID Doroteo Moseley DO   40 mg at 02/10/25 0840    potassium chloride everton ER (KLOR-CON M20) CR tablet 20 mEq  20 mEq Oral BID Doroteo Moseley DO   20 mEq at 02/10/25 0840    sodium chloride (PF) 0.9% PF flush 3 mL  3 mL Intracatheter Q8H Doroteo Moseley DO   3 mL at 02/10/25 0840     Data     Laboratory:  Recent Labs   Lab 02/08/25  1001 02/07/25  1038   WBC 13.9* 15.6*   HGB 12.5 13.8   HCT 36.6 40.7   MCV 91 92    219     Recent Labs   Lab 02/08/25  1001 02/07/25  1038    137   POTASSIUM 4.3 4.3   CHLORIDE 102 100   CO2 25 26   ANIONGAP 12 11   * 147*   BUN 23.4* 16.1   CR 0.63 0.70   GFRESTIMATED >90 >90   GEOVANNY 9.4 9.2     No results for input(s): \"CULT\" in the last 168 hours.  No results found for: \"TROPI\"    Imaging:  No results found for this or any previous visit (from the past 24 hours).      Doroteo Moseley DO  Formerly [facility] Hospitalist  [address] [place] [address]  Securely message " with Vocera (more info)  Text page via [facility] Paging/Directory   02/10/2025

## 2025-02-10 NOTE — CONSULTS
Care Management Initial Consult    General Information  Assessment completed with: Patient,    Type of CM/SW Visit: Initial Assessment    Primary Care Provider verified and updated as needed: Yes ([facility])   Readmission within the last 30 days:        Reason for Consult: discharge planning  Advance Care Planning:            Communication Assessment  Patient's communication style: spoken language (English or Bilingual)    Hearing Difficulty or Deaf: yes   Wear Glasses or Blind: no    Cognitive  Cognitive/Neuro/Behavioral: WDL                      Living Environment:   People in home: spouse     Current living Arrangements: house      Able to return to prior arrangements: yes       Family/Social Support:  Care provided by: self, spouse/significant other  Provides care for: no one  Marital Status:   Support system: , Children, Friend          Description of Support System: Supportive, Involved         Current Resources:   Patient receiving home care services: No        Community Resources:    Equipment currently used at home: commode chair, grab bar, tub/shower, shower chair, wheelchair, manual  Supplies currently used at home: Oxygen Tubing/Supplies    Employment/Financial:  Employment Status:          Financial Concerns:             Does the patient's insurance plan have a 3 day qualifying hospital stay waiver?  Yes     Which insurance plan 3 day waiver is available? Alternative insurance waiver    Will the waiver be used for post-acute placement? No    Lifestyle & Psychosocial Needs:  Social Drivers of Health     Food Insecurity: Low Risk  (1/22/2024)    Food Insecurity     Within the past 12 months, did you worry that your food would run out before you got money to buy more?: No     Within the past 12 months, did the food you bought just not last and you didn t have money to get more?: No   Depression: At risk (1/22/2024)    PHQ-2     PHQ-2 Score: 4   Housing Stability: Low Risk   (1/22/2024)    Housing Stability     Do you have housing? : Yes     Are you worried about losing your housing?: No   Tobacco Use: Low Risk  (1/6/2025)    Received from [facility]    Patient History     Smoking Tobacco Use: Never     Smokeless Tobacco Use: Never     Passive Exposure: Not on file   Financial Resource Strain: Low Risk  (1/22/2024)    Financial Resource Strain     Within the past 12 months, have you or your family members you live with been unable to get utilities (heat, electricity) when it was really needed?: No   Alcohol Use: Not At Risk (6/7/2022)    [facility]    AUDIT-C     Frequency of Alcohol Consumption: 2-3 times a week     Average Number of Drinks: 1 or 2     Frequency of Binge Drinking: Never     : Not on file     : Not on file     : Not on file   Transportation Needs: Low Risk  (1/22/2024)    Transportation Needs     Within the past 12 months, has lack of transportation kept you from medical appointments, getting your medicines, non-medical meetings or appointments, work, or from getting things that you need?: No   Physical Activity: Inactive (6/7/2022)    [facility]    Exercise Vital Sign     Days of Exercise per Week: 0 days     Minutes of Exercise per Session: 0 min     : Not on file     : Not on file     : Not on file     : Not on file   Interpersonal Safety: Low Risk  (2/7/2025)    Interpersonal Safety     Do you feel physically and emotionally safe where you currently live?: Yes     Within the past 12 months, have you been hit, slapped, kicked or otherwise physically hurt by someone?: No     Within the past 12 months, have you been humiliated or emotionally abused in other ways by your partner or ex-partner?: No   Stress: Stress Concern Present (6/7/2022)    [facility]    [employer] - Occupational Stress Questionnaire     Feeling of Stress  : To some extent     : Not on file     : Not on file     : Not on file     : Not on file     : Not on file   Social Connections: Unknown (9/6/2024)    Received from [facility]    Social Connections     Frequency of Communication with Friends and Family: Not on file   Health Literacy: Not on file       Functional Status:  Prior to admission patient needed assistance:   Dependent ADLs:: Wheelchair-independent  Dependent IADLs:: Cleaning, Laundry, Shopping, Meal Preparation, Transportation       Mental Health Status:          Chemical Dependency Status:                Values/Beliefs:  Spiritual, Cultural Beliefs, Sikhism Practices, Values that affect care:                 Discussed  Partnership in Safe Discharge Planning  document with patient/family: No    Additional Information:  SW met with patient due to consult placed for discharge planning. She resides in a rambler with her spouse. Patient reports no steps to get in. She uses 10L of 02 at baseline & has a BiPAP at home. Patient shared she is wheelchair bound at baseline. She voiced her spouse assists with transportation, housework, & laundry. Her friends have had a meal train for them for years & they receive meals 5 days a week. They have a chair lift to get to the basement but patient does not typically go down there. She shared she has met with [facility] & is aware hospice has been recommended. Patient is not open to hospice at this time and voiced she has [facility]'s phone number to call after discharge when she is ready to enroll. Patient voiced she may be interested in wheelchair transport to home. SW explained that she would need to be on 6L of 02 or less in order to qualify for wheelchair transport otherwise she would have to go via stretcher. SW discussed estimated cost for stretcher transportation. Patient stated she does not feel stretcher transport is needed & she would instead have her spouse &  son provide transportation to home. Patient denied any discharge needs.     Next Steps: SW will continue to follow to assist prn with discharge planning.     HARLAN Forrest

## 2025-02-10 NOTE — PLAN OF CARE
Goal Outcome Evaluation:      Plan of Care Reviewed With: patient    Overall Patient Progress: improvingOverall Patient Progress: improving    Outcome Evaluation: Patient anticipates returning home with her spouse when medically cleared for discharge.

## 2025-02-10 NOTE — PLAN OF CARE
"Temp: 97.9  F (36.6  C) Temp src: Oral BP: (!) 142/97 Pulse: 98   Resp: 20 SpO2: 93 % O2 Device: Oxymizer cannula Oxygen Delivery: 12 LPM    A&Ox4, denies pain. 10L O2, 15 during activity, desatts into high 70's regardless but eventually rebouLPIV in hand infiltrated, ice applied, and IV removed. [place]wick. A2 gb pivot to commode on shift. IV solumedrol and PO lasix. Scheduled Nebs via RT. IV abx continued. Discharge home when ready, possible hospice care post discharge.      Goal Outcome Evaluation:      Plan of Care Reviewed With: patient    Overall Patient Progress: no changeOverall Patient Progress: no change    Outcome Evaluation: pt still becoming SOB with movement requiring 15L during and after until rebounds back into the 90's. VSS otherwise.      Problem: Adult Inpatient Plan of Care  Goal: Plan of Care Review  Description: The Plan of Care Review/Shift note should be completed every shift.  The Outcome Evaluation is a brief statement about your assessment that the patient is improving, declining, or no change.  This information will be displayed automatically on your shift  note.  Outcome: Not Progressing  Flowsheets (Taken 2/10/2025 1738)  Outcome Evaluation: pt still becoming SOB with movement requiring 15L during and after until rebounds back into the 90's. VSS otherwise.  Plan of Care Reviewed With: patient  Overall Patient Progress: no change  Goal: Patient-Specific Goal (Individualized)  Description: You can add care plan individualizations to a care plan. Examples of Individualization might be:  \"Parent requests to be called daily at 9am for status\", \"I have a hard time hearing out of my right ear\", or \"Do not touch me to wake me up as it startles  me\".  Outcome: Not Progressing  Goal: Absence of Hospital-Acquired Illness or Injury  Outcome: Not Progressing  Intervention: Identify and Manage Fall Risk  Recent Flowsheet Documentation  Taken 2/10/2025 1730 by Shaun Bentley RN  Safety Promotion/Fall " Prevention: safety round/check completed  Taken 2/10/2025 0840 by Shaun Bentley RN  Safety Promotion/Fall Prevention: safety round/check completed  Intervention: Prevent Skin Injury  Recent Flowsheet Documentation  Taken 2/10/2025 1730 by Shaun Bnetley RN  Body Position: position maintained  Taken 2/10/2025 0840 by Shaun Bentley RN  Body Position: position maintained  Intervention: Prevent and Manage VTE (Venous Thromboembolism) Risk  Recent Flowsheet Documentation  Taken 2/10/2025 1730 by Shaun Bentley RN  VTE Prevention/Management: patient refused intervention  Taken 2/10/2025 0840 by Shaun Bentley RN  VTE Prevention/Management: patient refused intervention  Goal: Optimal Comfort and Wellbeing  Outcome: Not Progressing  Goal: Readiness for Transition of Care  Outcome: Not Progressing     Problem: Comorbidity Management  Goal: Maintenance of COPD Symptom Control  Outcome: Not Progressing  Intervention: Maintain COPD Symptom Control  Recent Flowsheet Documentation  Taken 2/10/2025 1730 by Shaun Bentley RN  Medication Review/Management: medications reviewed  Taken 2/10/2025 0840 by Shaun Bentley RN  Medication Review/Management: medications reviewed  Goal: Blood Pressure in Desired Range  Outcome: Not Progressing  Intervention: Maintain Blood Pressure Management  Recent Flowsheet Documentation  Taken 2/10/2025 1730 by Shaun Bentley RN  Medication Review/Management: medications reviewed  Taken 2/10/2025 0840 by Shaun Bentley RN  Medication Review/Management: medications reviewed     Problem: Skin Injury Risk Increased  Goal: Skin Health and Integrity  Outcome: Not Progressing  Intervention: Plan: Nurse Driven Intervention: Moisture Management  Recent Flowsheet Documentation  Taken 2/10/2025 0840 by Shaun Bentley RN  Moisture Interventions:   Encourage regular toileting   Urinary collection device  Intervention: Plan: Nurse Driven Intervention: Friction and Shear  Recent  Flowsheet Documentation  Taken 2/10/2025 0840 by Shaun Bentley, RN  Friction/Shear Interventions: Pad bony prominence (elbow pads, heel pads, ear protectors)  Intervention: Optimize Skin Protection  Recent Flowsheet Documentation  Taken 2/10/2025 1730 by Shaun Bentley, RN  Activity Management: activity adjusted per tolerance  Head of Bed (HOB) Positioning: HOB at 30-45 degrees  Taken 2/10/2025 0840 by Shaun Bentley, RN  Activity Management: activity adjusted per tolerance  Head of Bed (HOB) Positioning: HOB at 30-45 degrees

## 2025-02-11 LAB
ANION GAP SERPL CALCULATED.3IONS-SCNC: 11 MMOL/L (ref 7–15)
BACTERIA SPT CULT: NORMAL
BUN SERPL-MCNC: 33.2 MG/DL (ref 8–23)
CALCIUM SERPL-MCNC: 9.2 MG/DL (ref 8.8–10.4)
CHLORIDE SERPL-SCNC: 102 MMOL/L (ref 98–107)
CREAT SERPL-MCNC: 0.69 MG/DL (ref 0.51–0.95)
CRP SERPL-MCNC: 11.05 MG/L
EGFRCR SERPLBLD CKD-EPI 2021: >90 ML/MIN/1.73M2
ERYTHROCYTE [DISTWIDTH] IN BLOOD BY AUTOMATED COUNT: 12.3 % (ref 10–15)
GLUCOSE SERPL-MCNC: 197 MG/DL (ref 70–99)
GRAM STAIN RESULT: NORMAL
HCO3 SERPL-SCNC: 29 MMOL/L (ref 22–29)
HCT VFR BLD AUTO: 36.3 % (ref 35–47)
HGB BLD-MCNC: 12.2 G/DL (ref 11.7–15.7)
MCH RBC QN AUTO: 30.7 PG (ref 26.5–33)
MCHC RBC AUTO-ENTMCNC: 33.6 G/DL (ref 31.5–36.5)
MCV RBC AUTO: 91 FL (ref 78–100)
PLATELET # BLD AUTO: 205 10E3/UL (ref 150–450)
POTASSIUM SERPL-SCNC: 3.8 MMOL/L (ref 3.4–5.3)
RBC # BLD AUTO: 3.97 10E6/UL (ref 3.8–5.2)
SODIUM SERPL-SCNC: 142 MMOL/L (ref 135–145)
WBC # BLD AUTO: 10.2 10E3/UL (ref 4–11)

## 2025-02-11 PROCEDURE — 87070 CULTURE OTHR SPECIMN AEROBIC: CPT | Performed by: INTERNAL MEDICINE

## 2025-02-11 PROCEDURE — 999N000156 HC STATISTIC RCP CONSULT EA 30 MIN

## 2025-02-11 PROCEDURE — 120N000001 HC R&B MED SURG/OB

## 2025-02-11 PROCEDURE — 94660 CPAP INITIATION&MGMT: CPT

## 2025-02-11 PROCEDURE — 36415 COLL VENOUS BLD VENIPUNCTURE: CPT | Performed by: INTERNAL MEDICINE

## 2025-02-11 PROCEDURE — 99232 SBSQ HOSP IP/OBS MODERATE 35: CPT | Performed by: INTERNAL MEDICINE

## 2025-02-11 PROCEDURE — 999N000157 HC STATISTIC RCP TIME EA 10 MIN

## 2025-02-11 PROCEDURE — 250N000011 HC RX IP 250 OP 636: Performed by: INTERNAL MEDICINE

## 2025-02-11 PROCEDURE — 250N000009 HC RX 250: Performed by: INTERNAL MEDICINE

## 2025-02-11 PROCEDURE — 94640 AIRWAY INHALATION TREATMENT: CPT

## 2025-02-11 PROCEDURE — 250N000011 HC RX IP 250 OP 636: Mod: JW | Performed by: STUDENT IN AN ORGANIZED HEALTH CARE EDUCATION/TRAINING PROGRAM

## 2025-02-11 PROCEDURE — 85041 AUTOMATED RBC COUNT: CPT | Performed by: INTERNAL MEDICINE

## 2025-02-11 PROCEDURE — 86140 C-REACTIVE PROTEIN: CPT | Performed by: INTERNAL MEDICINE

## 2025-02-11 PROCEDURE — 250N000013 HC RX MED GY IP 250 OP 250 PS 637: Performed by: INTERNAL MEDICINE

## 2025-02-11 PROCEDURE — 80048 BASIC METABOLIC PNL TOTAL CA: CPT | Performed by: INTERNAL MEDICINE

## 2025-02-11 PROCEDURE — 94640 AIRWAY INHALATION TREATMENT: CPT | Mod: 76

## 2025-02-11 RX ORDER — SULFAMETHOXAZOLE AND TRIMETHOPRIM 800; 160 MG/1; MG/1
1 TABLET ORAL
Qty: 70 TABLET | Refills: 0 | Status: SHIPPED | OUTPATIENT
Start: 2025-02-12

## 2025-02-11 RX ORDER — PREDNISONE 10 MG/1
TABLET ORAL
Qty: 504 TABLET | Refills: 0 | Status: SHIPPED | OUTPATIENT
Start: 2025-02-11 | End: 2025-06-17

## 2025-02-11 RX ORDER — CEFTRIAXONE 2 G/1
2 INJECTION, POWDER, FOR SOLUTION INTRAMUSCULAR; INTRAVENOUS
Status: COMPLETED | OUTPATIENT
Start: 2025-02-11 | End: 2025-02-11

## 2025-02-11 RX ADMIN — IPRATROPIUM BROMIDE AND ALBUTEROL SULFATE 3 ML: .5; 3 SOLUTION RESPIRATORY (INHALATION) at 16:47

## 2025-02-11 RX ADMIN — ENOXAPARIN SODIUM 40 MG: 40 INJECTION SUBCUTANEOUS at 00:37

## 2025-02-11 RX ADMIN — ENOXAPARIN SODIUM 40 MG: 40 INJECTION SUBCUTANEOUS at 13:14

## 2025-02-11 RX ADMIN — PANTOPRAZOLE SODIUM 40 MG: 40 TABLET, DELAYED RELEASE ORAL at 09:18

## 2025-02-11 RX ADMIN — POTASSIUM CHLORIDE 20 MEQ: 1500 TABLET, EXTENDED RELEASE ORAL at 13:14

## 2025-02-11 RX ADMIN — IPRATROPIUM BROMIDE AND ALBUTEROL SULFATE 3 ML: .5; 3 SOLUTION RESPIRATORY (INHALATION) at 08:36

## 2025-02-11 RX ADMIN — POTASSIUM CHLORIDE 20 MEQ: 1500 TABLET, EXTENDED RELEASE ORAL at 09:18

## 2025-02-11 RX ADMIN — CEFTRIAXONE 2 G: 2 INJECTION, POWDER, FOR SOLUTION INTRAMUSCULAR; INTRAVENOUS at 09:22

## 2025-02-11 RX ADMIN — PANTOPRAZOLE SODIUM 40 MG: 40 TABLET, DELAYED RELEASE ORAL at 21:08

## 2025-02-11 RX ADMIN — IPRATROPIUM BROMIDE AND ALBUTEROL SULFATE 3 ML: .5; 3 SOLUTION RESPIRATORY (INHALATION) at 12:23

## 2025-02-11 RX ADMIN — AZITHROMYCIN DIHYDRATE 250 MG: 250 TABLET ORAL at 09:19

## 2025-02-11 RX ADMIN — FUROSEMIDE 40 MG: 40 TABLET ORAL at 09:19

## 2025-02-11 RX ADMIN — METHYLPREDNISOLONE SODIUM SUCCINATE 62.5 MG: 125 INJECTION, POWDER, FOR SOLUTION INTRAMUSCULAR; INTRAVENOUS at 17:37

## 2025-02-11 RX ADMIN — IRBESARTAN 300 MG: 300 TABLET ORAL at 09:19

## 2025-02-11 RX ADMIN — FLUTICASONE FUROATE AND VILANTEROL TRIFENATATE 1 PUFF: 200; 25 POWDER RESPIRATORY (INHALATION) at 08:36

## 2025-02-11 RX ADMIN — DILTIAZEM HYDROCHLORIDE 300 MG: 180 CAPSULE, COATED, EXTENDED RELEASE ORAL at 09:18

## 2025-02-11 RX ADMIN — METHYLPREDNISOLONE SODIUM SUCCINATE 62.5 MG: 125 INJECTION, POWDER, FOR SOLUTION INTRAMUSCULAR; INTRAVENOUS at 09:18

## 2025-02-11 RX ADMIN — IPRATROPIUM BROMIDE AND ALBUTEROL SULFATE 3 ML: .5; 3 SOLUTION RESPIRATORY (INHALATION) at 19:48

## 2025-02-11 RX ADMIN — FUROSEMIDE 40 MG: 40 TABLET ORAL at 13:14

## 2025-02-11 ASSESSMENT — ACTIVITIES OF DAILY LIVING (ADL)
ADLS_ACUITY_SCORE: 64
ADLS_ACUITY_SCORE: 63
ADLS_ACUITY_SCORE: 64
ADLS_ACUITY_SCORE: 63
ADLS_ACUITY_SCORE: 64

## 2025-02-11 NOTE — PLAN OF CARE
"  Goal Outcome Evaluation:      Plan of Care Reviewed With: patient    Overall Patient Progress: no change    Outcome Evaluation:     Alert and oriented x 4 , On 12 L  NC , BiPAP /CPAP at bed time .  Afebrile . Severe activity intolerance , gets desaturate in the 80's with slightest activity . Intermittent dry cough. Diminished breath sounds diminished and crackles heard all over the lung field. Denied chest pain , palpitation , headache or lightheadedness .     Problem: Adult Inpatient Plan of Care  Goal: Plan of Care Review  Description: The Plan of Care Review/Shift note should be completed every shift.  The Outcome Evaluation is a brief statement about your assessment that the patient is improving, declining, or no change.  This information will be displayed automatically on your shift  note.  Outcome: Not Progressing  Flowsheets (Taken 2/11/2025 0359)  Outcome Evaluation: vs  Plan of Care Reviewed With: patient  Overall Patient Progress: no change  Goal: Patient-Specific Goal (Individualized)  Description: You can add care plan individualizations to a care plan. Examples of Individualization might be:  \"Parent requests to be called daily at 9am for status\", \"I have a hard time hearing out of my right ear\", or \"Do not touch me to wake me up as it startles  me\".  Outcome: Progressing  Goal: Absence of Hospital-Acquired Illness or Injury  Outcome: Adequate for Care Transition  Intervention: Identify and Manage Fall Risk  Recent Flowsheet Documentation  Taken 2/11/2025 0036 by Demetrius Cruz, RN  Safety Promotion/Fall Prevention: safety round/check completed  Taken 2/10/2025 2005 by Demetrius Cruz, RN  Safety Promotion/Fall Prevention:   safety round/check completed   clutter free environment maintained   increase visualization of patient   lighting adjusted   nonskid shoes/slippers when out of bed   patient and family education   room near nurse's station  Intervention: Prevent Skin Injury  Recent Flowsheet " Documentation  Taken 2/10/2025 2005 by Demetrius Cruz, RN  Body Position: position maintained  Intervention: Prevent and Manage VTE (Venous Thromboembolism) Risk  Recent Flowsheet Documentation  Taken 2/10/2025 2005 by Demetrius Cruz, RN  VTE Prevention/Management: patient refused intervention  Goal: Optimal Comfort and Wellbeing  Outcome: Adequate for Care Transition  Goal: Readiness for Transition of Care  Outcome: Progressing

## 2025-02-11 NOTE — PLAN OF CARE
"Pt a&ox4, able to make needs known. BP slightly elevated, oxygen on 10L oxymask and up to 15L with exertion. Up to BSC x1 this shift, desated to 84% with ambulation. Required 3-4min to recover up to >90%- Provider aware.     Ambulating stand pivot to the commode.     Problem: Adult Inpatient Plan of Care  Goal: Plan of Care Review  Description: The Plan of Care Review/Shift note should be completed every shift.  The Outcome Evaluation is a brief statement about your assessment that the patient is improving, declining, or no change.  This information will be displayed automatically on your shift  note.  Outcome: Adequate for Care Transition  Goal: Patient-Specific Goal (Individualized)  Description: You can add care plan individualizations to a care plan. Examples of Individualization might be:  \"Parent requests to be called daily at 9am for status\", \"I have a hard time hearing out of my right ear\", or \"Do not touch me to wake me up as it startles  me\".  Outcome: Adequate for Care Transition  Goal: Absence of Hospital-Acquired Illness or Injury  Intervention: Identify and Manage Fall Risk  Recent Flowsheet Documentation  Taken 2/11/2025 0930 by Lizbeth Hamilton RN  Safety Promotion/Fall Prevention:   safety round/check completed   room organization consistent   room near nurse's station   room door open   nonskid shoes/slippers when out of bed   clutter free environment maintained  Intervention: Prevent Skin Injury  Recent Flowsheet Documentation  Taken 2/11/2025 1130 by Lizbeth Hamilton RN  Body Position: weight shifting  Taken 2/11/2025 0930 by Lizbeth Hamilton RN  Body Position: weight shifting  Intervention: Prevent and Manage VTE (Venous Thromboembolism) Risk  Recent Flowsheet Documentation  Taken 2/11/2025 0930 by Lizbeth Hamilton RN  VTE Prevention/Management: SCDs off (sequential compression devices)  Intervention: Prevent Infection  Recent Flowsheet Documentation  Taken 2/11/2025 0930 by Lizbeth Hamilton RN  Infection " Prevention:   single patient room provided   rest/sleep promoted  Goal: Readiness for Transition of Care  Outcome: Adequate for Care Transition

## 2025-02-11 NOTE — PROGRESS NOTES
[facility]    Hospitalist Progress Note  Name: Mary E Weiland    MRN: [id number]  Provider:  Doroteo Moseley DO  Date of Service: 02/11/2025    Summary of Stay: Mary E Weiland is a 69 year old female with past medical history of end-stage idiopathic pulmonary fibrosis with severe pulmonary hypertension, chronic home oxygen dependence of 10 L high flow continuously at baseline, COPD, type 2 diabetes mellitus, hypertension who presented on 2/7/2025 with chief complaint of shortness of breath.  In the emergency department, the patient was found to have a temperature of 98.8  F, heart rate 117, blood pressure 108/79, respiratory rate 37, SpO2 94% on continuous BiPAP. Initial lab work showed proBNP 3935, high-sensitivity troponin 30, WBC 15.6. COVID-19, influenza, RSV were negative. Chest x-ray shows left lower lung consolidative opacity with air bronchograms with hazy opacities in the upper lungs with some reticulation with findings suspicious for multifocal pneumonitis. The patient was started on ceftriaxone and azithromycin with concern for underlying community-acquired pneumonia. The patient was also started on IV Solu-Medrol in the setting of end-stage idiopathic pulmonary fibrosis. Pulmonology was consulted to see the patient.  Pulmonology recommended 5 days of IV steroids followed by prolonged steroid taper of 60 mg daily and decreasing dose by 5 mg every 2 weeks and adding Bactrim prophylaxis.  The patient was weaned to her baseline oxygen requirements of 10 L high flow at rest and 15 L with minimal activity.  She has plans to sign onto hospice when she gets back home, which she has stated that is all set and she just needs to contact them to start it.    TODAY'S PLAN: Appreciate pulmonology recommendations.  Today is the last day of antibiotics.  Pulmonology recommending 5 days of IV steroids with last dose being tomorrow morning.  Tentative plan is for discharge home tomorrow.  Patient  states she will have her family bring her own wheelchair and her own oxygen.  She has a van that she can roll the wheelchair directly into and she will be able to roll her wheelchair directly into her house to minimize transfers.  Patient is on her baseline oxygen requirements and looks quite good.  Discussed with patient plan for prolonged steroid taper and Bactrim prophylaxis.  She was agreeable to this.  Anticipate discharge home tomorrow.  All questions answered.    Oxygen Documentation  I certify that this patient, Mary E Weiland has been under my care (or a nurse practitioner or physican's assistant working with me). This is the face-to-face encounter for oxygen medical necessity.      At the time of this encounter, I have reviewed the qualifying testing and have determined that supplemental oxygen is reasonable and necessary and is expected to improve the patient's condition in a home setting.         Patient has continued oxygen desaturation due to Pulmonary Fibrosis J84.10.    If portability is ordered, is the patient mobile within the home? yes    Was this visit performed as a telehealth visit: No    Problem List:   Acute on Chronic Hypoxic Respiratory Failure  Possible Community Acquired Pneumonia  End-Stage Pulmonary Fibrosis  Severe Pulmonary Artery Hypertension  NSTEMI - Likely Type 2  - At baseline pt uses 10L HF continuously.  Pt increases it to 15L with minimal activity.    - Follows with Dr. Darling of Pulmonology  - IV Solumedrol 40 mg q8h.  Pulmonology recommending 5-days of IV steroids followed by prolonged steroid taper of prednisone 60 mg daily with 5 mg decrease every two weeks, until reach 20 mg daily then follow up with outpatient pulmonologist.  Will start bactrim prophylaxis tomorrow.  - Appreciate Pulmonology recommendations regarding steroid dosing  - Lenin - pt reports some benefit in the past with these, so will schedule  - Ceftriaxone and Azithromycin - completed 5-day course on  2/11  - At baseline the patient desaturates to the 70s when she changes position/transfers.  It takes her 5-10 min to improve.  She has high flow oxygen at home and a home bipap with bleed in oxygen.       Goals of Care  - Pt is confirmed DNR/DNI with pt and family at bedside.  Pt reports she previously had plans to sign of to hospice, but improved after a hospitalization so put it off.  She expressed interest in getting stabilized then signing on to hospice.  Pt reports hospice is arranged at home, she just needs to contact them to set it up.  She plans to do this when she gets home from the hospital.     Chronic Medical Problems:  Hypertension  Type 2 Diabetes Mellitus  COPD  Morbid Obesity - BMI 40.78    I spent 43 minutes in reviewing this patient's labs, imaging, medications, medical history.  In addition time was spent interviewing the patient, communicating with family, and medical decision making.      DVT Prophylaxis: Enoxaparin (Lovenox) SQ  Code Status: No CPR- Do NOT Intubate  Diet: Regular Diet Adult    Hernandez Catheter: Not present    Disposition: Medically Ready for Discharge: Anticipated Tomorrow    Goals to discharge include: completed 5-days of IV steroids  Family updated today: No     Interval History   Pt seen and examined.  Pt reports feeling well.  Tried transfer to commode yesterday and desaturated as she normally does but improved with waiting.    -Data reviewed today: I personally reviewed all new labs and imaging results over the last 24 hours.     Physical Exam   Temp: 97.9  F (36.6  C) Temp src: Oral BP: 124/73 Pulse: 70   Resp: 20 SpO2: 97 % O2 Device: Oxymizer cannula Oxygen Delivery: 12 LPM  Vitals:    02/07/25 1031 02/11/25 0628   Weight: 114.6 kg (252 lb 10.4 oz) 117 kg (257 lb 15 oz)     Vital Signs with Ranges  Temp:  [97.9  F (36.6  C)-98.1  F (36.7  C)] 97.9  F (36.6  C)  Pulse:  [70-98] 70  Resp:  [18-20] 20  BP: (118-142)/(73-97) 124/73  FiO2 (%):  [40 %] 40 %  SpO2:  [92 %-97 %]  97 %  I/O last 3 completed shifts:  In: 360 [P.O.:360]  Out: 2000 [Urine:2000]    GENERAL: No apparent distress. Awake, alert, and fully oriented.  HEENT: Normocephalic, atraumatic. Extraocular movements intact.  CARDIOVASCULAR: Regular rate and rhythm without murmurs or rubs. No S3.  PULMONARY: Clear bilaterally.  GASTROINTESTINAL: Soft, non-tender, non-distended. Bowel sounds normoactive.   EXTREMITIES: No cyanosis or clubbing. No edema.  NEUROLOGICAL: CN 2-12 grossly intact, no focal neurological deficits.  DERMATOLOGICAL: No rash, ulcer, bruising, nor jaundice.    Medications   Current Facility-Administered Medications   Medication Dose Route Frequency Provider Last Rate Last Admin    No lozenges or gum should be given while patient on BIPAP/AVAPS/AVAPS AE   Does not apply Continuous PRN Doroteo Moseley DO        Patient may continue current oral medications   Does not apply Continuous PRN Doroteo Moseley DO         Current Facility-Administered Medications   Medication Dose Route Frequency Provider Last Rate Last Admin    azithromycin (ZITHROMAX) tablet 250 mg  250 mg Oral Daily Doroteo Moseley DO   250 mg at 02/10/25 0839    cefTRIAXone (ROCEPHIN) 2 g vial to attach to  ml bag for ADULTS or NS 50 ml bag for PEDS  2 g Intravenous Q24H [facility] Doroteo Moseley DO        diltiazem ER COATED BEADS (CARDIZEM CD/CARTIA XT) 24 hr capsule 300 mg  300 mg Oral Daily Doroteo Moseley DO   300 mg at 02/10/25 0840    enoxaparin ANTICOAGULANT (LOVENOX) injection 40 mg  40 mg Subcutaneous Q12H Doroteo Moseley DO   40 mg at 02/11/25 0037    fluticasone-vilanterol (BREO ELLIPTA) 200-25 MCG/ACT inhaler 1 puff  1 puff Inhalation Daily Doroteo Moseley DO   1 puff at 02/11/25 0836    furosemide (LASIX) tablet 40 mg  40 mg Oral BID Doroteo Moseley DO   40 mg at 02/10/25 1238    ipratropium - albuterol 0.5 mg/2.5 mg/3 mL (DUONEB) neb solution 3 mL  3 mL Nebulization 4x daily Pradip  "Doroteo Mojica DO   3 mL at 02/11/25 0836    irbesartan (AVAPRO) tablet 300 mg  300 mg Oral Daily Doroteo Moseley DO   300 mg at 02/10/25 0840    methylPREDNISolone Na Suc (solu-MEDROL) injection 62.5 mg  62.5 mg Intravenous BID IS Basia Barahona MD   62.5 mg at 02/10/25 1720    nintedanib (OFEV) capsule 100 mg  100 mg Oral Every Other Day Doroteo Moseley DO   100 mg at 02/10/25 0844    pantoprazole (PROTONIX) EC tablet 40 mg  40 mg Oral BID Doroteo Moseley DO   40 mg at 02/10/25 2026    potassium chloride everton ER (KLOR-CON M20) CR tablet 20 mEq  20 mEq Oral BID Doroteo Moseley DO   20 mEq at 02/10/25 1238    sodium chloride (PF) 0.9% PF flush 3 mL  3 mL Intracatheter Q8H Doroteo Moseley DO   3 mL at 02/11/25 0040     Data     Laboratory:  Recent Labs   Lab 02/11/25  0708 02/08/25  1001 02/07/25  1038   WBC 10.2 13.9* 15.6*   HGB 12.2 12.5 13.8   HCT 36.3 36.6 40.7   MCV 91 91 92    204 219     Recent Labs   Lab 02/11/25  0708 02/08/25  1001 02/07/25  1038    139 137   POTASSIUM 3.8 4.3 4.3   CHLORIDE 102 102 100   CO2 29 25 26   ANIONGAP 11 12 11   * 166* 147*   BUN 33.2* 23.4* 16.1   CR 0.69 0.63 0.70   GFRESTIMATED >90 >90 >90   GEOVANNY 9.2 9.4 9.2     No results for input(s): \"CULT\" in the last 168 hours.  No results found for: \"TROPI\"    Imaging:  No results found for this or any previous visit (from the past 24 hours).      Doroteo Moseley DO  [facility] Hospitalist  [address] [place] [address]  Securely message with [employer] (more info)  Text page via [employer] Paging/Directory   02/11/2025   "

## 2025-02-11 NOTE — PROVIDER NOTIFICATION
02/10/25 5045   Tech Time   $Tech Time (10 minute increments) 3   Mode: CPAP/ BiPAP/ AVAPS/ AVAPS AE   CPAP/BiPAP/ AVAPS/ AVAPS AE Mode BiPAP S/T   Equipment   Device v60   Device Serial Number [id number]   CPAP/BiPAP/Settings   BIPAP/CPAP On Standby On   IPAP/EPAP (cmH2O) 12/7   Rate (breaths/min) 10   Oxygen (%) 40   Timed Inspiration (sec) 1   IPAP RISE  Settings (V60) 3   CPAP/BiPAP Patient Parameters   IPAP (cm H2O) 12 cmH2O   EPAP (cm H2O) 7 cmH2O   Pressure Support (cm H2O) 5 cmH2O   RR Total (breaths/min) 20 breaths/min   Vt (mL) 569 mL   Minute Ventilation (L/min) 10.1 L/min   Peak Inspiratory Pressure (cm H2O) 12 cmH2O   Pt.  Leak (L/min) 5 L/min   CPAP/BiPAP/AVAPS/AVAPS AE Alarms   High Pressure (cm H2O) 25 cmH2O   Low Pressure (cm H2O) 5   Low Pressure Delay (sec) 20 sec   Lo Min Vent 2   High Rate (breaths/min) 50 breaths/min   Low Rate (breaths/min) 8   Audible Alarm set at (Volume of Alarm) 7   Humidifier Checked N/A   RT Device Skin Assessment   Oxygen Delivery Device CPAP/BiPAP Mask   Interface Under-nose Mask - Medium   Ventilator Arm In Place No   Site Appearance neck circumference Clean and dry   Site Appearance bridge of nose Clean and dry   Site Appearance occiput Clean and dry   Strap Tightness Finger Allowance between head and device strap   Device Skin Interventions Taken No adjustments needed   Respiratory WDL   Respiratory WDL X   Rhythm/Pattern, Respiratory assisted mechanically   Expansion/Accessory Muscles/Retractions expansion symmetric;no retractions;no use of accessory muscles   Nebulizer Assessment & Treatment   Nebulizer Device In line   $RT Use ONLY Delivery Method Nebulizer - Additional   Pretreatment Heart Rate (beats/min) 80   Pretreatment Resp Rate (breaths/min) 20   Pretreat Breath Sounds - Bilat - All Lobes diminished   Patient Position HOB elevated   Respiratory Treatment Status (SVN) given     Ermias Solano, RT on 2/10/2025 at 10:26 PM

## 2025-02-11 NOTE — PROVIDER NOTIFICATION
02/11/25 0325   RCAT Assessment   Reason for Assessment COPD   Pulmonary Status 3   Surgical Status 0   Chest X-ray 2   Respiratory Pattern 1   Mental Status 0   Breath Sounds 2   Cough Effectiveness 0   Level of Activity 1   O2 Required for SpO2>=92% 3   Acuity Level (points) 12   Acuity Level  3   Re-eval Interval Guideline Every 7 days if 2 consecutive evals unchanged   Re-evaluation Date 02/18/25   Clinical Indications/Symptoms   Aerosol Therapy RCAT protocol   Broncho-pulmonary Hygiene History of mucous producing disease   Volume Expansion Prevent atelectasis   Aerosol Therapy Plan   RT Treatment Nebulizer   Anticholinergic/Beta-Andrenergic Agonist Duoneb soln (0.5mg/3mg per 3mL) neb Max 6 doses/24h   Aerosol Treatment Frequency Acuity Level 3: QID/PRN @noc-Mod wheezing/Hx asthma/secretion removal   Broncho-Pulmonary Hygiene Plan   Broncho-Pulmonary Hygiene Treatment Coughing techniques   Broncho-Pulm Hygiene Frequency Acuity Level 3: TID-Small amounts secretions/poor cough, hx of secretions   Volume Expansion Plan   Volume Expansion Treatment Incentive Spirometer   Volume Expansion Frequency Acuity Level 3: TID-At risk for developing atelectasis     Ermias Solano, RT on 2/11/2025 at 3:26 AM

## 2025-02-11 NOTE — PROVIDER NOTIFICATION
1315: Saba messaged Dr. Moseley Dasking if he still wanted the sputum sample? Also updated Dr. Moseley that she did well getting up to commode and back to bed on her own. Desated to about 84% on 15L but was able to recover in about 3-4 minutes back up to 90-91%    Dr. Moseley responded to try and get the sample if we can but it will not change things necessarily.

## 2025-02-11 NOTE — PROGRESS NOTES
Brief pulmonary consult note:     Pt continued on 60mg Solumedrol BID IV over weekend. CRP has come down substantially.  Pt is about her baseline FiO2 (10-15 LPM at rest, 12-15 LPM on exertion).     -Would complete total of 5d of IV steroids then transition to PO 60mg prednisone as long as pt has continued clinical improvement  -Once on prednisone 60mg/daily, will need slow taper of prednisone (wean by 5mg every two weeks until down to 20mg/daily until follow up with her outpt pulmonologist) so will need to be on PJP ppx (ex. Bactrim DS MWF) until under 20mg/day of prednisone.  -Please obtain daily weights and monitor for worsening clinical peripheral edema- may need adjustments of diuretics as steroids can lead to fluid retention     Will continue to follow.     Carlin Jiménez MD  [facility],  of Medicine  Pulmonary/Critical Care Medicine  February 10, 2025

## 2025-02-12 VITALS
HEART RATE: 100 BPM | RESPIRATION RATE: 22 BRPM | BODY MASS INDEX: 41.45 KG/M2 | TEMPERATURE: 97.8 F | WEIGHT: 257.94 LBS | OXYGEN SATURATION: 94 % | DIASTOLIC BLOOD PRESSURE: 101 MMHG | HEIGHT: 66 IN | SYSTOLIC BLOOD PRESSURE: 159 MMHG

## 2025-02-12 LAB
BACTERIA BLD CULT: NO GROWTH
BACTERIA BLD CULT: NO GROWTH
CRP SERPL-MCNC: 7.04 MG/L

## 2025-02-12 PROCEDURE — 86140 C-REACTIVE PROTEIN: CPT | Performed by: INTERNAL MEDICINE

## 2025-02-12 PROCEDURE — 999N000157 HC STATISTIC RCP TIME EA 10 MIN

## 2025-02-12 PROCEDURE — 94640 AIRWAY INHALATION TREATMENT: CPT

## 2025-02-12 PROCEDURE — 99239 HOSP IP/OBS DSCHRG MGMT >30: CPT | Performed by: STUDENT IN AN ORGANIZED HEALTH CARE EDUCATION/TRAINING PROGRAM

## 2025-02-12 PROCEDURE — 94640 AIRWAY INHALATION TREATMENT: CPT | Mod: 76

## 2025-02-12 PROCEDURE — 250N000011 HC RX IP 250 OP 636: Performed by: STUDENT IN AN ORGANIZED HEALTH CARE EDUCATION/TRAINING PROGRAM

## 2025-02-12 PROCEDURE — 36415 COLL VENOUS BLD VENIPUNCTURE: CPT | Performed by: INTERNAL MEDICINE

## 2025-02-12 PROCEDURE — 94660 CPAP INITIATION&MGMT: CPT

## 2025-02-12 PROCEDURE — 250N000011 HC RX IP 250 OP 636: Performed by: INTERNAL MEDICINE

## 2025-02-12 PROCEDURE — 250N000009 HC RX 250: Performed by: INTERNAL MEDICINE

## 2025-02-12 PROCEDURE — 250N000013 HC RX MED GY IP 250 OP 250 PS 637: Performed by: INTERNAL MEDICINE

## 2025-02-12 RX ADMIN — IRBESARTAN 300 MG: 300 TABLET ORAL at 08:59

## 2025-02-12 RX ADMIN — ENOXAPARIN SODIUM 40 MG: 40 INJECTION SUBCUTANEOUS at 12:19

## 2025-02-12 RX ADMIN — FLUTICASONE FUROATE AND VILANTEROL TRIFENATATE 1 PUFF: 200; 25 POWDER RESPIRATORY (INHALATION) at 09:19

## 2025-02-12 RX ADMIN — IPRATROPIUM BROMIDE AND ALBUTEROL SULFATE 3 ML: .5; 3 SOLUTION RESPIRATORY (INHALATION) at 09:19

## 2025-02-12 RX ADMIN — FUROSEMIDE 40 MG: 40 TABLET ORAL at 12:19

## 2025-02-12 RX ADMIN — FUROSEMIDE 40 MG: 40 TABLET ORAL at 08:59

## 2025-02-12 RX ADMIN — DILTIAZEM HYDROCHLORIDE 300 MG: 180 CAPSULE, COATED, EXTENDED RELEASE ORAL at 08:59

## 2025-02-12 RX ADMIN — ENOXAPARIN SODIUM 40 MG: 40 INJECTION SUBCUTANEOUS at 00:06

## 2025-02-12 RX ADMIN — METHYLPREDNISOLONE SODIUM SUCCINATE 62.5 MG: 125 INJECTION, POWDER, FOR SOLUTION INTRAMUSCULAR; INTRAVENOUS at 08:59

## 2025-02-12 RX ADMIN — POTASSIUM CHLORIDE 20 MEQ: 1500 TABLET, EXTENDED RELEASE ORAL at 12:19

## 2025-02-12 RX ADMIN — IPRATROPIUM BROMIDE AND ALBUTEROL SULFATE 3 ML: .5; 3 SOLUTION RESPIRATORY (INHALATION) at 12:12

## 2025-02-12 RX ADMIN — POTASSIUM CHLORIDE 20 MEQ: 1500 TABLET, EXTENDED RELEASE ORAL at 09:00

## 2025-02-12 RX ADMIN — PANTOPRAZOLE SODIUM 40 MG: 40 TABLET, DELAYED RELEASE ORAL at 08:59

## 2025-02-12 ASSESSMENT — ACTIVITIES OF DAILY LIVING (ADL)
ADLS_ACUITY_SCORE: 59
ADLS_ACUITY_SCORE: 59
ADLS_ACUITY_SCORE: 64
ADLS_ACUITY_SCORE: 59
ADLS_ACUITY_SCORE: 64
ADLS_ACUITY_SCORE: 61
ADLS_ACUITY_SCORE: 64
ADLS_ACUITY_SCORE: 64
ADLS_ACUITY_SCORE: 62
ADLS_ACUITY_SCORE: 59
ADLS_ACUITY_SCORE: 64
ADLS_ACUITY_SCORE: 64

## 2025-02-12 NOTE — DISCHARGE SUMMARY
"[facility]  Hospitalist Discharge Summary      Date of Admission:  2/7/2025  Date of Discharge:  {DISCHARGE DATE:417283}  Discharging Provider: Gilbert Cunningham DO  Discharge Service: Hospitalist Service    Discharge Diagnoses   ***    Clinically Significant Risk Factors     # Severe Obesity: Estimated body mass index is 41.63 kg/m  as calculated from the following:    Height as of this encounter: 1.676 m (5' 6\").    Weight as of this encounter: 117 kg (257 lb 15 oz).       Follow-ups Needed After Discharge   Follow-up Appointments       Follow-up and recommended labs and tests       Follow up with primary care provider, [facility] Jarred Godinze, within 7 days for hospital follow- up.  No follow up labs or test are needed.    You have been prescribed a steroid taper.  Recommend you start with prednisone 60 mg daily and decreasing by 5 mg every 2 weeks until you reach 20 mg daily.  You should contact your outpatient pulmonologist in regards to your recent hospitalization and prolonged steroid taper to get guidance as far as what to do when you reach 20 mg daily of prednisone.  While on prolonged high-dose steroids you are at risk for bacterial and viral illnesses.  You have been prescribed Bactrim every Monday, Wednesday, and Friday as prophylaxis.  Monitor for signs of fevers and chills as these may be indications for infections.  You should also continue your pantoprazole as prednisone can be associated with gastric ulcers/stomach issues.    He would benefit from continuing nebulizer treatments at home every 4-6 hours at least for the next 2 to 3 days and then on an as-needed basis.    Recommend you contact hospice to start their services.  Given the immunosuppression with the steroids you are high risk for infections.  If you choose to come to the hospital and hospital interventions such as continuous BiPAP are not working it would be impossible to get you home with hospice.  " "Recommend continued discussions with your family regarding her goals of care.            {Additional follow-up instructions/to-do's for PCP    :***}    Unresulted Labs Ordered in the Past 30 Days of this Admission       Date and Time Order Name Status Description    2/12/2025 12:01 AM CRP inflammation In process     2/7/2025 10:35 AM Blood Culture Line, venous Preliminary     2/7/2025 10:35 AM Blood Culture Peripheral Blood Preliminary         These results will be followed up by ***    Discharge Disposition   {Discharge to:1574068::\"Discharged to home\"}  Condition at discharge: {CONDITION:742094::\"Stable\"}    Hospital Course   {OPTIONAL -- use to select A&P section   :497914}    Consultations This Hospital Stay   RESPIRATORY CARE IP CONSULT  PULMONARY IP CONSULT  SOCIAL WORK IP CONSULT    Code Status   No CPR- Do NOT Intubate    Time Spent on this Encounter   {How much time did you spend on discharge?:89385218}       Gilbert Cunningham DO  [place] [address] [facility]  [address] [place] [address]  Phone: [phone]  Fax: [phone]  ______________________________________________________________________    Physical Exam   Vital Signs: Temp: 97.1  F (36.2  C) Temp src: Axillary BP: 122/78 Pulse: 59   Resp: 16 SpO2: 97 % O2 Device: BiPAP/CPAP Oxygen Delivery: 10 LPM  Weight: 257 lbs 15.01 oz  {Recommend personal SmartPhrase or Notewriter for exam (OPTIONAL)   :873500}       Primary Care Physician   [facility] Derian Godinez    Discharge Orders      Reason for your hospital stay    Community Acquired Pneumonia, End-Stage Pulmonary Fibrosis     Follow-up and recommended labs and tests     Follow up with primary care provider, [facility] Jarred Godinez, within 7 days for hospital follow- up.  No follow up labs or test are needed.    You have been prescribed a steroid taper.  Recommend you start with prednisone 60 mg daily and decreasing by 5 mg every 2 weeks until you reach " 20 mg daily.  You should contact your outpatient pulmonologist in regards to your recent hospitalization and prolonged steroid taper to get guidance as far as what to do when you reach 20 mg daily of prednisone.  While on prolonged high-dose steroids you are at risk for bacterial and viral illnesses.  You have been prescribed Bactrim every Monday, Wednesday, and Friday as prophylaxis.  Monitor for signs of fevers and chills as these may be indications for infections.  You should also continue your pantoprazole as prednisone can be associated with gastric ulcers/stomach issues.    He would benefit from continuing nebulizer treatments at home every 4-6 hours at least for the next 2 to 3 days and then on an as-needed basis.    Recommend you contact hospice to start their services.  Given the immunosuppression with the steroids you are high risk for infections.  If you choose to come to the hospital and hospital interventions such as continuous BiPAP are not working it would be impossible to get you home with hospice.  Recommend continued discussions with your family regarding her goals of care.     Activity    Your activity upon discharge: activity as tolerated     Oxygen Adult/Peds    Oxygen Documentation  I certify that this patient, Mary E Weiland has been under my care (or a nurse practitioner or physican's assistant working with me). This is the face-to-face encounter for oxygen medical necessity.      At the time of this encounter, I have reviewed the qualifying testing and have determined that supplemental oxygen is reasonable and necessary and is expected to improve the patient's condition in a home setting.         Patient has continued oxygen desaturation due to Pulmonary Fibrosis J84.10.    If portability is ordered, is the patient mobile within the home? yes    Was this visit performed as a telehealth visit: No     Diet    Follow this diet upon discharge: Current Diet:Orders Placed This Encounter       Regular Diet Adult       Significant Results and Procedures   {Data for Discharge Summary:372482}    Discharge Medications   Current Discharge Medication List        START taking these medications    Details   predniSONE (DELTASONE) 10 MG tablet Take 6 tablets (60 mg) by mouth daily for 14 days, THEN 5.5 tablets (55 mg) daily for 14 days, THEN 5 tablets (50 mg) daily for 14 days, THEN 4.5 tablets (45 mg) daily for 14 days, THEN 4 tablets (40 mg) daily for 14 days, THEN 3.5 tablets (35 mg) daily for 14 days, THEN 3 tablets (30 mg) daily for 14 days, THEN 2.5 tablets (25 mg) daily for 14 days, THEN 2 tablets (20 mg) daily for 14 days.  Qty: 504 tablet, Refills: 0    Associated Diagnoses: Idiopathic pulmonary fibrosis (H)      sulfamethoxazole-trimethoprim (BACTRIM DS) 800-160 MG tablet Take 1 tablet by mouth Every Mon, Wed, Fri Morning.  Qty: 70 tablet, Refills: 0    Associated Diagnoses: Idiopathic pulmonary fibrosis (H)           CONTINUE these medications which have NOT CHANGED    Details   albuterol (PROAIR HFA/PROVENTIL HFA/VENTOLIN HFA) 108 (90 Base) MCG/ACT inhaler Inhale 2 puffs into the lungs every 4 hours as needed for shortness of breath, wheezing or cough Use the inhaler as needed while using the nebulized treatment 4x/day    Comments: Pharmacy may dispense brand covered by insurance (Proair, or proventil or ventolin or generic albuterol inhaler)  Associated Diagnoses: IPF (idiopathic pulmonary fibrosis) (H)      diltiazem ER COATED BEADS (CARDIZEM CD/CARTIA XT) 300 MG 24 hr capsule TAKE 1 CAPSULE(300 MG) BY MOUTH DAILY  Qty: 90 capsule, Refills: 2    Associated Diagnoses: Benign essential hypertension      fluticasone-vilanterol (BREO ELLIPTA) 200-25 MCG/INH inhaler Inhale 1 puff into the lungs daily  Qty:        furosemide (LASIX) 40 MG tablet Take 1 tablet (40 mg) by mouth 2 times daily Patient taking 1 tablet twice a day  Qty: 180 tablet, Refills: 3    Associated Diagnoses: Localized edema       irbesartan (AVAPRO) 300 MG tablet TAKE 1 TABLET(300 MG) BY MOUTH DAILY  Qty: 90 tablet, Refills: 0    Associated Diagnoses: Benign essential hypertension      nintedanib (OFEV) 100 MG capsule Take 100 mg by mouth every other day.      omeprazole (PRILOSEC) 40 MG DR capsule Take 1 capsule (40 mg) by mouth daily  Qty:        potassium chloride ER (K-TAB) 20 MEQ CR tablet Take 1 tablet (20 mEq) by mouth 2 times daily  Qty: 180 tablet, Refills: 3    Associated Diagnoses: Localized edema           Allergies   Allergies   Allergen Reactions    No Known Allergies       Details   predniSONE (DELTASONE) 10 MG tablet Take 6 tablets (60 mg) by mouth daily for 14 days, THEN 5.5 tablets (55 mg) daily for 14 days, THEN 5 tablets (50 mg) daily for 14 days, THEN 4.5 tablets (45 mg) daily for 14 days, THEN 4 tablets (40 mg) daily for 14 days, THEN 3.5 tablets (35 mg) daily for 14 days, THEN 3 tablets (30 mg) daily for 14 days, THEN 2.5 tablets (25 mg) daily for 14 days, THEN 2 tablets (20 mg) daily for 14 days.  Qty: 504 tablet, Refills: 0    Associated Diagnoses: Idiopathic pulmonary fibrosis (H)      sulfamethoxazole-trimethoprim (BACTRIM DS) 800-160 MG tablet Take 1 tablet by mouth Every Mon, Wed, Fri Morning.  Qty: 70 tablet, Refills: 0    Associated Diagnoses: Idiopathic pulmonary fibrosis (H)           CONTINUE these medications which have NOT CHANGED    Details   albuterol (PROAIR HFA/PROVENTIL HFA/VENTOLIN HFA) 108 (90 Base) MCG/ACT inhaler Inhale 2 puffs into the lungs every 4 hours as needed for shortness of breath, wheezing or cough Use the inhaler as needed while using the nebulized treatment 4x/day    Comments: Pharmacy may dispense brand covered by insurance (Proair, or proventil or ventolin or generic albuterol inhaler)  Associated Diagnoses: IPF (idiopathic pulmonary fibrosis) (H)      diltiazem ER COATED BEADS (CARDIZEM CD/CARTIA XT) 300 MG 24 hr capsule TAKE 1 CAPSULE(300 MG) BY MOUTH DAILY  Qty: 90 capsule, Refills: 2    Associated Diagnoses: Benign essential hypertension      fluticasone-vilanterol (BREO ELLIPTA) 200-25 MCG/INH inhaler Inhale 1 puff into the lungs daily  Qty:        furosemide (LASIX) 40 MG tablet Take 1 tablet (40 mg) by mouth 2 times daily Patient taking 1 tablet twice a day  Qty: 180 tablet, Refills: 3    Associated Diagnoses: Localized edema      irbesartan (AVAPRO) 300 MG tablet TAKE 1 TABLET(300 MG) BY MOUTH DAILY  Qty: 90 tablet, Refills: 0    Associated Diagnoses: Benign essential hypertension      nintedanib (OFEV) 100 MG capsule Take 100 mg by  mouth every other day.      omeprazole (PRILOSEC) 40 MG DR capsule Take 1 capsule (40 mg) by mouth daily  Qty:        potassium chloride ER (K-TAB) 20 MEQ CR tablet Take 1 tablet (20 mEq) by mouth 2 times daily  Qty: 180 tablet, Refills: 3    Associated Diagnoses: Localized edema           Allergies   Allergies   Allergen Reactions    No Known Allergies

## 2025-02-12 NOTE — PLAN OF CARE
Pt is alert and oriented x4. Pt denies pain or discomfort. Vitals stable. Pt is on 02 10L oximyzer. Pt wears BIPAP at bedtime. Pt lungs are diminished. Pt denies shortness of breath. Pt has pressure sore on buttocks. Pt has Mepilex on coccyx. Pt has purewick in place. Pt was repositioned during the shift. Pt is assist of two in transfer and cares. Pt is sleeping in bed. Bed alarm in place and call light within reach.

## 2025-02-12 NOTE — PLAN OF CARE
Pt a&ox4, able to make needs known. BP elevated. O2 11-15L oxymizer, voiding to purewick. Denies pain    Discharging home today with family.     Patient's After Visit Summary was reviewed with patient and/or family and spouse.   Patient verbalized understanding of After Visit Summary, recommended follow up and was given an opportunity to ask questions.   Discharge medications sent home with patient/family: YES   Discharged with spouse and son.       Problem: Adult Inpatient Plan of Care  Goal: Absence of Hospital-Acquired Illness or Injury  Intervention: Identify and Manage Fall Risk  Recent Flowsheet Documentation  Taken 2/12/2025 0845 by Lizbeth Hamilton RN  Safety Promotion/Fall Prevention:   assistive device/personal items within reach   clutter free environment maintained   nonskid shoes/slippers when out of bed   room organization consistent   safety round/check completed   room near nurse's station  Intervention: Prevent Skin Injury  Recent Flowsheet Documentation  Taken 2/12/2025 0845 by Lizbeth Hamilton RN  Body Position: weight shifting  Skin Protection: tubing/devices free from skin contact  Intervention: Prevent and Manage VTE (Venous Thromboembolism) Risk  Recent Flowsheet Documentation  Taken 2/12/2025 0845 by Lizbeth Hamilton RN  VTE Prevention/Management: SCDs off (sequential compression devices)  Intervention: Prevent Infection  Recent Flowsheet Documentation  Taken 2/12/2025 0845 by Lizbeth Hamilton RN  Infection Prevention:   single patient room provided   rest/sleep promoted   hand hygiene promoted  Goal: Optimal Comfort and Wellbeing  Intervention: Provide Person-Centered Care  Recent Flowsheet Documentation  Taken 2/12/2025 0845 by Lizbeth Hamilton RN  Trust Relationship/Rapport:   reassurance provided   thoughts/feelings acknowledged   empathic listening provided   Goal Outcome Evaluation:

## 2025-02-12 NOTE — PLAN OF CARE
infectious Disease called that pt respiratory culture which was collected today got infected and needs to be recollected. Hospitalist paged for reorder.

## 2025-02-12 NOTE — PROVIDER NOTIFICATION
02/11/25 2524   Tech Time   $Tech Time (10 minute increments) 3   Mode: CPAP/ BiPAP/ AVAPS/ AVAPS AE   CPAP/BiPAP/ AVAPS/ AVAPS AE Mode BiPAP S/T   Equipment   Device v60   Device Serial Number [id number]   CPAP/BiPAP/Settings   BIPAP/CPAP On Standby On   IPAP/EPAP (cmH2O) 12/7   Rate (breaths/min) 10   Oxygen (%) 40   Timed Inspiration (sec) 1   IPAP RISE  Settings (V60) 3   CPAP/BiPAP Patient Parameters   IPAP (cm H2O) 12 cmH2O   EPAP (cm H2O) 7 cmH2O   Pressure Support (cm H2O) 5 cmH2O   RR Total (breaths/min) 17 breaths/min   Vt (mL) 401 mL   Minute Ventilation (L/min) 7.4 L/min   Peak Inspiratory Pressure (cm H2O) 12 cmH2O   Pt.  Leak (L/min) 11 L/min   CPAP/BiPAP/AVAPS/AVAPS AE Alarms   High Pressure (cm H2O) 25 cmH2O   Low Pressure (cm H2O) 5   Low Pressure Delay (sec) 20 sec   Lo Min Vent 2   High Rate (breaths/min) 50 breaths/min   Low Rate (breaths/min) 8   Audible Alarm set at (Volume of Alarm) 7   Humidifier Checked N/A   RT Device Skin Assessment   Oxygen Delivery Device CPAP/BiPAP Mask   Interface Under-nose Mask - Medium   Ventilator Arm In Place No   Site Appearance neck circumference Clean and dry   Site Appearance bridge of nose Clean and dry   Site Appearance occiput Clean and dry   Strap Tightness Finger Allowance between head and device strap   Device Skin Interventions Taken No adjustments needed   Respiratory WDL   Respiratory WDL X   Rhythm/Pattern, Respiratory assisted mechanically   Expansion/Accessory Muscles/Retractions expansion symmetric;no retractions;no use of accessory muscles     Ermias Solano, RT on 2/11/2025 at 10:14 PM

## 2025-04-12 DIAGNOSIS — I10 BENIGN ESSENTIAL HYPERTENSION: ICD-10-CM

## 2025-04-14 RX ORDER — IRBESARTAN 300 MG/1
300 TABLET ORAL DAILY
Qty: 90 TABLET | Refills: 0 | Status: SHIPPED | OUTPATIENT
Start: 2025-04-14

## 2025-06-13 ENCOUNTER — APPOINTMENT (OUTPATIENT)
Dept: GENERAL RADIOLOGY | Facility: CLINIC | Age: 70
End: 2025-06-13
Attending: EMERGENCY MEDICINE
Payer: COMMERCIAL

## 2025-06-13 ENCOUNTER — HOSPITAL ENCOUNTER (EMERGENCY)
Facility: CLINIC | Age: 70
Discharge: HOME | End: 2025-06-13
Attending: EMERGENCY MEDICINE | Admitting: EMERGENCY MEDICINE
Payer: COMMERCIAL

## 2025-06-13 ENCOUNTER — APPOINTMENT (OUTPATIENT)
Dept: CT IMAGING | Facility: CLINIC | Age: 70
End: 2025-06-13
Attending: EMERGENCY MEDICINE
Payer: COMMERCIAL

## 2025-06-13 VITALS
SYSTOLIC BLOOD PRESSURE: 126 MMHG | DIASTOLIC BLOOD PRESSURE: 69 MMHG | OXYGEN SATURATION: 94 % | RESPIRATION RATE: 24 BRPM | HEART RATE: 99 BPM | TEMPERATURE: 100.2 F

## 2025-06-13 DIAGNOSIS — J95.850: ICD-10-CM

## 2025-06-13 DIAGNOSIS — D72.829 LEUKOCYTOSIS, UNSPECIFIED TYPE: ICD-10-CM

## 2025-06-13 DIAGNOSIS — J96.11 CHRONIC RESPIRATORY FAILURE WITH HYPOXIA (H): ICD-10-CM

## 2025-06-13 LAB
ANION GAP SERPL CALCULATED.3IONS-SCNC: 12 MMOL/L (ref 7–15)
ATRIAL RATE - MUSE: 113 BPM
BASE EXCESS BLDV CALC-SCNC: 8.8 MMOL/L (ref -3–3)
BASOPHILS # BLD AUTO: 0.1 10E3/UL (ref 0–0.2)
BASOPHILS NFR BLD AUTO: 0 %
BUN SERPL-MCNC: 23.1 MG/DL (ref 8–23)
CALCIUM SERPL-MCNC: 9.5 MG/DL (ref 8.8–10.4)
CHLORIDE SERPL-SCNC: 98 MMOL/L (ref 98–107)
CREAT SERPL-MCNC: 0.68 MG/DL (ref 0.51–0.95)
D DIMER PPP FEU-MCNC: 0.59 UG/ML FEU (ref 0–0.5)
DIASTOLIC BLOOD PRESSURE - MUSE: NORMAL MMHG
EGFRCR SERPLBLD CKD-EPI 2021: >90 ML/MIN/1.73M2
EOSINOPHIL # BLD AUTO: 0.5 10E3/UL (ref 0–0.7)
EOSINOPHIL NFR BLD AUTO: 3 %
ERYTHROCYTE [DISTWIDTH] IN BLOOD BY AUTOMATED COUNT: 12.1 % (ref 10–15)
FLUAV RNA SPEC QL NAA+PROBE: NEGATIVE
FLUBV RNA RESP QL NAA+PROBE: NEGATIVE
GLUCOSE SERPL-MCNC: 139 MG/DL (ref 70–99)
HCO3 BLDV-SCNC: 34 MMOL/L (ref 21–28)
HCO3 SERPL-SCNC: 30 MMOL/L (ref 22–29)
HCT VFR BLD AUTO: 39.8 % (ref 35–47)
HGB BLD-MCNC: 13.7 G/DL (ref 11.7–15.7)
HOLD SPECIMEN: NORMAL
IMM GRANULOCYTES # BLD: 0.1 10E3/UL
IMM GRANULOCYTES NFR BLD: 1 %
INTERPRETATION ECG - MUSE: NORMAL
LACTATE SERPL-SCNC: 1.1 MMOL/L (ref 0.7–2)
LYMPHOCYTES # BLD AUTO: 1.8 10E3/UL (ref 0.8–5.3)
LYMPHOCYTES NFR BLD AUTO: 12 %
MCH RBC QN AUTO: 32.5 PG (ref 26.5–33)
MCHC RBC AUTO-ENTMCNC: 34.4 G/DL (ref 31.5–36.5)
MCV RBC AUTO: 94 FL (ref 78–100)
MONOCYTES # BLD AUTO: 1.3 10E3/UL (ref 0–1.3)
MONOCYTES NFR BLD AUTO: 9 %
NEUTROPHILS # BLD AUTO: 11.2 10E3/UL (ref 1.6–8.3)
NEUTROPHILS NFR BLD AUTO: 76 %
NRBC # BLD AUTO: 0 10E3/UL
NRBC BLD AUTO-RTO: 0 /100
NT-PROBNP SERPL-MCNC: 532 PG/ML (ref 0–353)
O2/TOTAL GAS SETTING VFR VENT: 50 %
OXYHGB MFR BLDV: 81 % (ref 70–75)
P AXIS - MUSE: 26 DEGREES
PCO2 BLDV: 48 MM HG (ref 40–50)
PH BLDV: 7.46 [PH] (ref 7.32–7.43)
PLATELET # BLD AUTO: 214 10E3/UL (ref 150–450)
PO2 BLDV: 45 MM HG (ref 25–47)
POTASSIUM SERPL-SCNC: 3.8 MMOL/L (ref 3.4–5.3)
PR INTERVAL - MUSE: 170 MS
QRS DURATION - MUSE: 76 MS
QT - MUSE: 308 MS
QTC - MUSE: 422 MS
R AXIS - MUSE: 110 DEGREES
RBC # BLD AUTO: 4.22 10E6/UL (ref 3.8–5.2)
RSV RNA SPEC NAA+PROBE: NEGATIVE
SAO2 % BLDV: 82.4 % (ref 70–75)
SARS-COV-2 RNA RESP QL NAA+PROBE: NEGATIVE
SODIUM SERPL-SCNC: 140 MMOL/L (ref 135–145)
SYSTOLIC BLOOD PRESSURE - MUSE: NORMAL MMHG
T AXIS - MUSE: 13 DEGREES
TROPONIN T SERPL HS-MCNC: 40 NG/L
TROPONIN T SERPL HS-MCNC: 41 NG/L
VENTRICULAR RATE- MUSE: 113 BPM
WBC # BLD AUTO: 14.8 10E3/UL (ref 4–11)

## 2025-06-13 PROCEDURE — 93005 ELECTROCARDIOGRAM TRACING: CPT

## 2025-06-13 PROCEDURE — 83880 ASSAY OF NATRIURETIC PEPTIDE: CPT | Performed by: EMERGENCY MEDICINE

## 2025-06-13 PROCEDURE — 71045 X-RAY EXAM CHEST 1 VIEW: CPT

## 2025-06-13 PROCEDURE — 36415 COLL VENOUS BLD VENIPUNCTURE: CPT | Performed by: EMERGENCY MEDICINE

## 2025-06-13 PROCEDURE — 83605 ASSAY OF LACTIC ACID: CPT | Performed by: EMERGENCY MEDICINE

## 2025-06-13 PROCEDURE — 999N000157 HC STATISTIC RCP TIME EA 10 MIN

## 2025-06-13 PROCEDURE — 80048 BASIC METABOLIC PNL TOTAL CA: CPT | Performed by: EMERGENCY MEDICINE

## 2025-06-13 PROCEDURE — 258N000003 HC RX IP 258 OP 636: Performed by: EMERGENCY MEDICINE

## 2025-06-13 PROCEDURE — 99285 EMERGENCY DEPT VISIT HI MDM: CPT | Mod: 25

## 2025-06-13 PROCEDURE — 85004 AUTOMATED DIFF WBC COUNT: CPT | Performed by: EMERGENCY MEDICINE

## 2025-06-13 PROCEDURE — 96360 HYDRATION IV INFUSION INIT: CPT

## 2025-06-13 PROCEDURE — 84484 ASSAY OF TROPONIN QUANT: CPT | Performed by: EMERGENCY MEDICINE

## 2025-06-13 PROCEDURE — 85379 FIBRIN DEGRADATION QUANT: CPT | Performed by: EMERGENCY MEDICINE

## 2025-06-13 PROCEDURE — 87637 SARSCOV2&INF A&B&RSV AMP PRB: CPT | Performed by: EMERGENCY MEDICINE

## 2025-06-13 PROCEDURE — 82805 BLOOD GASES W/O2 SATURATION: CPT | Performed by: EMERGENCY MEDICINE

## 2025-06-13 PROCEDURE — 250N000013 HC RX MED GY IP 250 OP 250 PS 637: Performed by: EMERGENCY MEDICINE

## 2025-06-13 PROCEDURE — 87040 BLOOD CULTURE FOR BACTERIA: CPT | Performed by: EMERGENCY MEDICINE

## 2025-06-13 PROCEDURE — 71250 CT THORAX DX C-: CPT

## 2025-06-13 RX ORDER — ACETAMINOPHEN 500 MG
1000 TABLET ORAL ONCE
Status: COMPLETED | OUTPATIENT
Start: 2025-06-13 | End: 2025-06-13

## 2025-06-13 RX ADMIN — SODIUM CHLORIDE 1000 ML: 0.9 INJECTION, SOLUTION INTRAVENOUS at 13:04

## 2025-06-13 RX ADMIN — ACETAMINOPHEN 1000 MG: 500 TABLET, FILM COATED ORAL at 11:55

## 2025-06-13 ASSESSMENT — ACTIVITIES OF DAILY LIVING (ADL)
ADLS_ACUITY_SCORE: 58

## 2025-06-13 ASSESSMENT — COLUMBIA-SUICIDE SEVERITY RATING SCALE - C-SSRS
2. HAVE YOU ACTUALLY HAD ANY THOUGHTS OF KILLING YOURSELF IN THE PAST MONTH?: NO
1. IN THE PAST MONTH, HAVE YOU WISHED YOU WERE DEAD OR WISHED YOU COULD GO TO SLEEP AND NOT WAKE UP?: NO
6. HAVE YOU EVER DONE ANYTHING, STARTED TO DO ANYTHING, OR PREPARED TO DO ANYTHING TO END YOUR LIFE?: NO

## 2025-06-13 NOTE — ED NOTES
Pt is on HFNC 20L 50%FiO2. Pt has noted labored breathing but pt states that this is her baseline. At rest, pt O2 sat around 91% but decreases to 88% when talking. Pt also states this is typical for her. Provider, MD Reynaldo aware.

## 2025-06-13 NOTE — ED NOTES
Assisted with patient triage (ambulance). Placed Pt. on monitor (5-lead continous ECG, pulse ox, BP cuff) EKG complete.

## 2025-06-13 NOTE — ED PROVIDER NOTES
Emergency Department Note      History of Present Illness     Chief Complaint   Shortness of Breath      HPI   Mary E Weiland is a 70 year old female with a history of hypertension, type 2 diabetes mellitus, hyperlipidemia, chronic hypoxic respiratory failure, gastroesophageal reflux disease, and chronic respiratory failure with hypoxia here for evaluation of shortness of breath. The patient states that she is normally on 10 L on [employer] at home, but at sometime last night her power went out which her oxygen machine uses electricity. She reports that she called [facility] and they dropped off 3 emergency tanks and she had 3 tanks at home. She notes that she called EMS because she ran out of oxygen tanks. She says that her normal oxygen saturation is at 91-92. When she ambulates or stands her oxygen saturation goes to 70s low 80s so she will up her oxygen to 15 L. She notes that she has been on two big tanks of oxygen that are connected by a big tube for almost a year now. She denies feeling worse than normal. No new shortness of breath. No fever, chills, or cough. No chest pain. The patient's  is at home and will let the patient know when the power comes back.     Independent Historian   None    Review of External Notes   I reviewed hospital admission on 02/07/2025. The patient was admitted for idiopathic pulmonary fibrosis and acute and chronic respiratory failure with hypoxia.    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  Type 2 diabetes  mellitus    Medications   Albuterol  Cardizem CD  Lasix  Avapro  Deltasone  Bactrim DS  Ofev  Prilosec  Breo Ellipta    Surgical History   Dilation and curettage, hysteroscopy    Physical Exam     Patient Vitals for the past 24 hrs:   BP Temp Temp src Pulse Resp SpO2   06/13/25 1405 -- -- -- 98 -- 92 %   06/13/25 1400 116/78 -- -- 98 -- (!) 91 %   06/13/25 1355 -- -- -- 101 -- (!) 87 %   06/13/25 1350 -- -- -- 100 -- (!) 91 %   06/13/25 1345 138/84 -- -- 102 -- --   06/13/25 1330 120/68 -- -- 98 -- 92 %   06/13/25 1315 121/86 -- -- 100 -- 94 %   06/13/25 1300 125/52 -- -- 104 -- 94 %   06/13/25 1245 130/66 -- -- 107 -- 93 %   06/13/25 1230 123/81 -- -- 110 -- 92 %   06/13/25 1229 -- -- -- 106 24 93 %   06/13/25 1225 112/86 -- -- 104 21 95 %   06/13/25 1210 -- -- -- 104 -- 93 %   06/13/25 1155 -- 100.2  F (37.9  C) -- -- -- --   06/13/25 1130 -- 100.2  F (37.9  C) Rectal -- -- --   06/13/25 1115 120/66 -- -- 112 22 (!) 91 %   06/13/25 1100 122/76 -- -- 107 -- 92 %   06/13/25 1045 132/81 -- -- 115 -- --   06/13/25 1044 -- -- -- 110 -- (!) 85 %   06/13/25 1035 -- -- -- 114 -- (!) 88 %   06/13/25 1030 116/76 -- -- 109 -- 92 %   06/13/25 1022 -- -- -- 105 -- (!) 91 %   06/13/25 1021 -- -- -- 109 -- (!) 91 %   06/13/25 1020 -- -- -- 109 -- 93 %   06/13/25 1019 -- -- -- 106 -- 92 %   06/13/25 1018 -- -- -- 110 -- 92 %   06/13/25 1017 -- -- -- 114 -- (!) 91 %   06/13/25 1016 -- -- -- 113 -- (!) 91 %   06/13/25 1015 106/71 -- -- 112 -- (!) 89 %   06/13/25 1003 -- -- -- 112 -- (!) 91 %   06/13/25 1002 -- -- -- 108 -- (!) 91 %   06/13/25 1001 -- -- -- 110 -- (!) 91 %   06/13/25 1000 112/72 -- -- 112 -- (!) 91 %   06/13/25 0959 -- -- -- 111 -- (!) 91 %   06/13/25 0958 -- -- -- 106 -- 93 %   06/13/25 0957 -- -- -- 109 -- 93 %   06/13/25 0956 -- -- -- 110 -- 93 %   06/13/25 0955 -- -- -- 108 -- 93 %   06/13/25 0954 -- -- -- 109 -- 92 %   06/13/25 0953 -- -- -- 111 -- 94 %   06/13/25 0952 -- -- -- 107 -- 92 %   06/13/25 0951 -- --  -- 106 -- 92 %   06/13/25 0950 -- -- -- 109 -- 92 %   06/13/25 0949 -- -- -- 108 -- 92 %   06/13/25 0948 -- -- -- 110 -- 93 %   06/13/25 0947 -- -- -- 109 -- 93 %   06/13/25 0946 -- -- -- 107 -- 92 %   06/13/25 0945 104/74 -- -- 112 -- 93 %   06/13/25 0938 -- -- -- 112 -- 92 %   06/13/25 0937 -- -- -- 112 -- (!) 91 %   06/13/25 0936 -- -- -- 111 -- (!) 91 %   06/13/25 0935 -- -- -- 112 18 (!) 91 %   06/13/25 0932 113/73 -- -- 110 -- 94 %   06/13/25 0930 113/73 -- -- 109 -- 97 %   06/13/25 0913 -- -- -- 111 28 93 %   06/13/25 0912 -- -- -- 109 -- (!) 91 %   06/13/25 0905 138/82 99  F (37.2  C) Oral (!) 122 30 (!) 83 %     Physical Exam  General: Elderly adult sitting upright  Eyes: PERRL, Conjunctive within normal limits.  No scleral icterus.  ENT: Moist mucous membranes, oropharynx clear.   CV: Normal S1S2, no murmur, rub or gallop.  Tachycardic, regular.  Resp: Diminished throughout.  Increased work of breathing.  Tachypneic.  Speaks in full sentences.  GI: Abdomen is soft, nontender and nondistended.   MSK: No pitting edema. Nontender. Normal active range of motion.  Skin: Warm and dry. No rashes or lesions or ecchymoses on visible skin.  Neuro: Alert and oriented. Responds appropriately to all questions and commands. No focal findings appreciated. Normal muscle tone.  Psych: Normal mood and affect.     Diagnostics     Lab Results   Labs Ordered and Resulted from Time of ED Arrival to Time of ED Departure   BASIC METABOLIC PANEL - Abnormal       Result Value    Sodium 140      Potassium 3.8      Chloride 98      Carbon Dioxide (CO2) 30 (*)     Anion Gap 12      Urea Nitrogen 23.1 (*)     Creatinine 0.68      GFR Estimate >90      Calcium 9.5      Glucose 139 (*)    BLOOD GAS VENOUS - Abnormal    pH Venous 7.46 (*)     pCO2 Venous 48      pO2 Venous 45      Bicarbonate Venous 34 (*)     Base Excess/Deficit Venous 8.8 (*)     FIO2 50      Oxyhemoglobin Venous 81 (*)     O2 Sat, Venous 82.4 (*)    CBC WITH PLATELETS  AND DIFFERENTIAL - Abnormal    WBC Count 14.8 (*)     RBC Count 4.22      Hemoglobin 13.7      Hematocrit 39.8      MCV 94      MCH 32.5      MCHC 34.4      RDW 12.1      Platelet Count 214      % Neutrophils 76      % Lymphocytes 12      % Monocytes 9      % Eosinophils 3      % Basophils 0      % Immature Granulocytes 1      NRBCs per 100 WBC 0      Absolute Neutrophils 11.2 (*)     Absolute Lymphocytes 1.8      Absolute Monocytes 1.3      Absolute Eosinophils 0.5      Absolute Basophils 0.1      Absolute Immature Granulocytes 0.1      Absolute NRBCs 0.0     TROPONIN T, HIGH SENSITIVITY - Abnormal    Troponin T, High Sensitivity 40 (*)    NT-PROBNP - Abnormal    NT-proBNP 532 (*)    D DIMER QUANTITATIVE - Abnormal    D-Dimer Quantitative 0.59 (*)    TROPONIN T, HIGH SENSITIVITY - Abnormal    Troponin T, High Sensitivity 41 (*)    INFLUENZA A/B, RSV AND SARS-COV2 PCR - Normal    Influenza A PCR Negative      Influenza B PCR Negative      RSV PCR Negative      SARS CoV2 PCR Negative     LACTIC ACID WHOLE BLOOD WITH 1X REPEAT IN 2 HR WHEN >2 - Normal    Lactic Acid, Initial 1.1     BLOOD CULTURE   BLOOD CULTURE       Imaging   Chest CT w/o contrast   Preliminary Result   IMPRESSION:    1.  Similar-appearing bilateral interstitial and groundglass opacities, consistent with chronic interstitial disease.   2.  Prominent and mildly enlarged mediastinal and hilar lymph nodes, significantly decreased in size to comparison.                     XR Chest Port 1 View   Final Result   IMPRESSION: Right upper and midlung and left lower lung opacities have been present on multiple priors and are likely fibrotic. Interval increase left midlung opacities may represent superimposed infection. No pleural effusion. Stable enlarged cardiac    silhouette. Obscured pulmonary vascularity.        EKG  ECG results from 06/13/25   EKG 12-lead, tracing only     Value    Systolic Blood Pressure     Diastolic Blood Pressure     Ventricular Rate  113    Atrial Rate 113    SC Interval 170    QRS Duration 76        QTc 422    P Axis 26    R AXIS 110    T Axis 13    Interpretation ECG      Sinus tachycardia with Premature atrial complexes  Possible Left atrial enlargement  Left posterior fascicular block  Abnormal ECG  Interpreted by me at 0928       Independent Interpretation   CXR: No pleural effusion.    ED Course      Medications Administered   Medications   sodium chloride 0.9% BOLUS 1,000 mL (1,000 mLs Intravenous $New Bag 6/13/25 1304)   acetaminophen (TYLENOL) tablet 1,000 mg (1,000 mg Oral $Given 6/13/25 1155)       Procedures   Procedures     Discussion of Management   None    ED Course   ED Course as of 06/13/25 1318   Fri Jun 13, 2025   0900 I obtained history and examined the patient as noted above.    0934 I rechecked and updated the patient.    1012 I rechecked and updated the patient.  I discussed laboratory test given ongoing tachycardia.     1133 I rechecked and updated the patient.  The patient continues to note that this is her baseline.  I discussed findings of the labs which include elevated white blood cell count.  She notes that in no way would want to be admitted.    1141 I rechecked and updated the patient.    1152 I rechecked and updated the patient.    1316 I rechecked and updated the patient.  There is no sign of pneumonia.  She has no other symptoms to suggest another cause for infection/her elevated white blood cell count which may be reactive.    We discussed plan for discharge and the patient is comfortable with this, again noting a firm desire not to be admitted and noting that she is at baseline with regards to her symptoms.        Additional Documentation  None    Medical Decision Making / Diagnosis     CMS Diagnoses: None    MIPS   None      [facility]   Mary E Weiland is a 70 year old female who presents to the emergency department with concerns that her oxygen was not working for an unknown period overnight due to a power  outage in her home.  She came to the emergency department to obtain oxygen.  She is noting her symptoms were at baseline.  On arrival she was tachycardic and hypoxic.  She also had a slight elevation of her temperature, not a true fever but in this circumstance given the tachycardia, laboratory evaluations undertaken.  Fortunately her symptoms were at baseline and she was not altered.  She does have slight leukocytosis but labs are otherwise fairly reassuring in the setting of chronic disease.  Imaging of her chest did not reveal a new infection.  The patient reported a desire to avoid hospitalization and a trend toward minimal testing due to her chronic respiratory failure requiring oxygen.  At this time there is no emergent indication for use of antibiotics.  Her tachycardia improved over time with IV fluids suggesting perhaps mild dehydration.  She has no symptoms that are new for her and I suspect that she is in fact at baseline with regards to her chronic medical issues.  If she develops any symptoms including fever or worsening of baseline symptoms, she should return immediate to the emergency department and she understands this.  She should talk to her primary care doctor and perhaps her pulmonologist if she does feel as though she wants to pursue hospice care.  At this time she does seem appropriate for discharge given her desires to go home and avoid hospitalization.  All questions were answered.    Disposition   The patient was discharged.     Diagnosis     ICD-10-CM    1. Chronic respiratory failure with hypoxia (H)  J96.11       2. Leukocytosis, unspecified type  D72.829       3. Mechanical failure of respirator (H)  J95.850     power outage causing inability to get supplemental oxygen           Scribe Disclosure:  IAngela, am serving as a scribe at 9:10 AM on 6/13/2025 to document services personally performed by Aarti Jacobs MD based on my observations and the provider's statements  to me.        Aarti Jacobs MD  06/13/25 0005

## 2025-06-13 NOTE — ED TRIAGE NOTES
Patient arrives via EMS from home.  Per EMS pt uses 10-15L of oxygen at baseline.  Reports power went out sometime last evening, was without humidified oxygen.  A&Ox4     Triage Assessment (Adult)       Row Name 06/13/25 0904          Triage Assessment    Airway WDL WDL        Respiratory WDL    Respiratory WDL X;rhythm/pattern;all     Rhythm/Pattern, Respiratory gasping;shortness of breath;tachypneic        Skin Circulation/Temperature WDL    Skin Circulation/Temperature WDL WDL        Cardiac WDL    Cardiac WDL WDL        Peripheral/Neurovascular WDL    Peripheral Neurovascular WDL WDL        Cognitive/Neuro/Behavioral WDL    Cognitive/Neuro/Behavioral WDL WDL

## 2025-06-18 LAB
BACTERIA SPEC CULT: NO GROWTH
BACTERIA SPEC CULT: NO GROWTH

## 2025-07-05 ENCOUNTER — HEALTH MAINTENANCE LETTER (OUTPATIENT)
Age: 70
End: 2025-07-05

## 2025-07-22 ENCOUNTER — VIRTUAL VISIT (OUTPATIENT)
Dept: INTERNAL MEDICINE | Facility: CLINIC | Age: 70
End: 2025-07-22
Payer: COMMERCIAL

## 2025-07-22 DIAGNOSIS — E11.9 TYPE 2 DIABETES MELLITUS WITHOUT COMPLICATION, WITHOUT LONG-TERM CURRENT USE OF INSULIN (H): ICD-10-CM

## 2025-07-22 DIAGNOSIS — J96.11 CHRONIC RESPIRATORY FAILURE WITH HYPOXIA (H): ICD-10-CM

## 2025-07-22 DIAGNOSIS — J84.112 IDIOPATHIC PULMONARY FIBROSIS (H): Primary | ICD-10-CM

## 2025-07-22 DIAGNOSIS — R60.0 LOCALIZED EDEMA: ICD-10-CM

## 2025-07-22 DIAGNOSIS — I10 BENIGN ESSENTIAL HYPERTENSION: ICD-10-CM

## 2025-07-22 PROCEDURE — 98006 SYNCH AUDIO-VIDEO EST MOD 30: CPT | Performed by: INTERNAL MEDICINE

## 2025-07-22 RX ORDER — IRBESARTAN 300 MG/1
300 TABLET ORAL DAILY
Qty: 90 TABLET | Refills: 0 | Status: SHIPPED | OUTPATIENT
Start: 2025-07-22

## 2025-07-22 RX ORDER — FUROSEMIDE 40 MG/1
40 TABLET ORAL 2 TIMES DAILY
Qty: 180 TABLET | Refills: 0 | Status: SHIPPED | OUTPATIENT
Start: 2025-07-22

## 2025-07-22 ASSESSMENT — PATIENT HEALTH QUESTIONNAIRE - PHQ9
SUM OF ALL RESPONSES TO PHQ QUESTIONS 1-9: 24
10. IF YOU CHECKED OFF ANY PROBLEMS, HOW DIFFICULT HAVE THESE PROBLEMS MADE IT FOR YOU TO DO YOUR WORK, TAKE CARE OF THINGS AT HOME, OR GET ALONG WITH OTHER PEOPLE: VERY DIFFICULT
SUM OF ALL RESPONSES TO PHQ QUESTIONS 1-9: 24

## 2025-07-22 NOTE — PROGRESS NOTES
"Peyton is a 70 year old who is being evaluated via a billable video visit.    How would you like to obtain your AVS? MyChart  If the video visit is dropped, the invitation should be resent by: Text to cell phone: [phone]  Will anyone else be joining your video visit? No      Assessment & Plan   (J84.112) Idiopathic pulmonary fibrosis (H)  (primary encounter diagnosis)  (J96.11) Chronic respiratory failure with hypoxia (H)  Comment: Patient expects to meet with Hospice care this week.     (R60.0) Localized edema  Comment: E-prescribed Rx.    Plan: furosemide (LASIX) 40 MG tablet          (I10) Benign essential hypertension  Comment: E-prescribed Rx.    Plan: irbesartan (AVAPRO) 300 MG tablet          (E11.9) Type 2 diabetes mellitus without complication, without long-term current use of insulin (H)  Comment: Control has been stable.     BMI  Estimated body mass index is 41.63 kg/m  as calculated from the following:    Height as of 2/7/25: 1.676 m (5' 6\").    Weight as of 2/11/25: 117 kg (257 lb 15 oz).     Depression Screening Follow Up        7/22/2025    11:21 AM   PHQ   PHQ-9 Total Score 24    Q9: Thoughts of better off dead/self-harm past 2 weeks More than half the days   F/U: Thoughts of suicide or self-harm No   F/U: Safety concerns No       Patient-reported         7/22/2025    11:21 AM   Last PHQ-9   1.  Little interest or pleasure in doing things 3   2.  Feeling down, depressed, or hopeless 3   3.  Trouble falling or staying asleep, or sleeping too much 3   4.  Feeling tired or having little energy 3   5.  Poor appetite or overeating 3   6.  Feeling bad about yourself 3   7.  Trouble concentrating 1   8.  Moving slowly or restless 3   Q9: Thoughts of better off dead/self-harm past 2 weeks 2   PHQ-9 Total Score 24    In the past two weeks have you had thoughts of suicide or self harm? No   Do you have concerns about your personal safety or the safety of others? No       Patient-reported         Follow Up " "Actions Taken  Patient intends to enroll in hospice care later this week.    Discussed the following ways the patient can remain in a safe environment:  be around others          Subjective   Peyton is a 70 year old, presenting for the following health issues:  Follow Up      7/22/2025    11:40 AM   Additional Questions   Roomed by Lesvia GALINDO CMA     Video Start Time: 1312    History of Present Illness       Reason for visit:  Meds  Symptom onset:  More than a month   She is taking medications regularly.        The patient has interstitial fibrosis which has necessitated a couple of ED visits in recent months.   She ran out of her diuretic a couple of days ago and noted some worsening  of symptoms.   She asks for me to E-prescribed Rx's for furosemide and for irbesartan to her pharmacy. She believes that she does not require any other Rx's to be refilled at present.     She anticipates meeting with a Hospice nurse later this week, perhaps Thursday or Friday.     Past medical, family and social histories as well as medications reviewed and updated as needed.    REVIEW OF SYSTEMS: The following systems have been completely reviewed and are negative except as noted above:   Constitutional, respiratory, cardiovascular, psychiatric systems.        Objective    Vitals - Patient Reported  Systolic (Patient Reported): 135  Diastolic (Patient Reported): 80  Weight (Patient Reported): 113.4 kg (250 lb)  Height (Patient Reported): 67 cm (2' 2.38\")  BMI (Based on Pt Reported Ht/Wt): 252.61        Physical Exam   GENERAL: alert and no distress  EYES: Eyes grossly normal to inspection.  No discharge or erythema, or obvious scleral/conjunctival abnormalities.  RESP: No audible wheeze, cough, or visible cyanosis.    SKIN: Visible skin clear. No significant rash, abnormal pigmentation or lesions.  NEURO: Cranial nerves grossly intact.  Mentation and speech appropriate for age.  PSYCH: Appropriate affect, tone, and pace of words      "   Video-Visit Details     Type of service:  Video Visit   Video End Time: 1317   Originating Location (pt. Location): Home    Distant Location (provider location):  On-site  Platform used for Video Visit: [employer]  Signed Electronically by: Carlo Brown MD,

## 2025-08-16 ENCOUNTER — HEALTH MAINTENANCE LETTER (OUTPATIENT)
Age: 70
End: 2025-08-16